# Patient Record
Sex: FEMALE | Race: WHITE | NOT HISPANIC OR LATINO | Employment: FULL TIME | URBAN - METROPOLITAN AREA
[De-identification: names, ages, dates, MRNs, and addresses within clinical notes are randomized per-mention and may not be internally consistent; named-entity substitution may affect disease eponyms.]

---

## 2023-10-04 PROBLEM — Z20.822 EXPOSURE TO COVID-19 VIRUS: Status: RESOLVED | Noted: 2021-07-15 | Resolved: 2023-10-04

## 2024-01-10 ENCOUNTER — HOSPITAL ENCOUNTER (EMERGENCY)
Facility: HOSPITAL | Age: 29
Discharge: HOME | End: 2024-01-10
Attending: EMERGENCY MEDICINE
Payer: MEDICAID

## 2024-01-10 VITALS
TEMPERATURE: 98 F | RESPIRATION RATE: 16 BRPM | HEART RATE: 70 BPM | SYSTOLIC BLOOD PRESSURE: 120 MMHG | HEIGHT: 65 IN | DIASTOLIC BLOOD PRESSURE: 83 MMHG | OXYGEN SATURATION: 98 % | BODY MASS INDEX: 29.99 KG/M2 | WEIGHT: 180 LBS

## 2024-01-10 DIAGNOSIS — S93.601A SPRAIN OF RIGHT FOOT, INITIAL ENCOUNTER: ICD-10-CM

## 2024-01-10 DIAGNOSIS — R52 PAIN: ICD-10-CM

## 2024-01-10 DIAGNOSIS — M79.671 RIGHT FOOT PAIN: Primary | ICD-10-CM

## 2024-01-10 LAB
B-HCG UR QL: NEGATIVE
CTP QC/QA: YES

## 2024-01-10 PROCEDURE — 81025 URINE PREGNANCY TEST: CPT | Performed by: EMERGENCY MEDICINE

## 2024-01-10 PROCEDURE — 29515 APPLICATION SHORT LEG SPLINT: CPT | Mod: RT

## 2024-01-10 PROCEDURE — 25000003 PHARM REV CODE 250: Performed by: EMERGENCY MEDICINE

## 2024-01-10 PROCEDURE — 99283 EMERGENCY DEPT VISIT LOW MDM: CPT

## 2024-01-10 RX ORDER — OXYCODONE AND ACETAMINOPHEN 5; 325 MG/1; MG/1
2 TABLET ORAL
Status: COMPLETED | OUTPATIENT
Start: 2024-01-10 | End: 2024-01-10

## 2024-01-10 RX ORDER — NAPROXEN 500 MG/1
500 TABLET ORAL 2 TIMES DAILY WITH MEALS
Qty: 30 TABLET | Refills: 0 | Status: SHIPPED | OUTPATIENT
Start: 2024-01-10 | End: 2024-04-03

## 2024-01-10 RX ORDER — ESCITALOPRAM OXALATE 20 MG/1
20 TABLET ORAL DAILY
COMMUNITY
End: 2024-01-17

## 2024-01-10 RX ADMIN — OXYCODONE HYDROCHLORIDE AND ACETAMINOPHEN 2 TABLET: 5; 325 TABLET ORAL at 09:01

## 2024-01-11 NOTE — DISCHARGE INSTRUCTIONS
Rest, ice, elevation.  Use splint for comfort.  Return to emergency department for worsening symptoms or any problems.  Nonweightbearing.  Follow-up with orthopedic surgeon for further evaluation

## 2024-01-11 NOTE — ED PROVIDER NOTES
Encounter Date: 1/10/2024       History     Chief Complaint   Patient presents with    Ankle Pain     Right ankle pain starting today at noon after stepping in hole     28-year-old female presented emergency department with right foot and ankle pain.  Patient stepped on dog poop and fell backwards and injured her right foot as she was falling.  Patient denies any other complaints.  Denies fever chills or nausea vomiting.  Patient could not bear weight on the right foot.  Denies any head injury or neck injury or pain      Review of patient's allergies indicates:   Allergen Reactions    Latex, natural rubber Anaphylaxis    Iodine and iodide containing products Rash     Past Medical History:   Diagnosis Date    Anemia      Past Surgical History:   Procedure Laterality Date     SECTION       Family History   Problem Relation Age of Onset    Breast cancer Mother     Cervical cancer Mother     Ovarian cancer Mother      Social History     Tobacco Use    Smoking status: Former     Current packs/day: 0.50     Types: Cigarettes    Smokeless tobacco: Never   Substance Use Topics    Alcohol use: Not Currently    Drug use: Never     Review of Systems   Constitutional: Negative.    HENT: Negative.     Eyes: Negative.    Respiratory: Negative.     Cardiovascular: Negative.  Negative for chest pain.   Gastrointestinal: Negative.    Endocrine: Negative.    Genitourinary: Negative.    Musculoskeletal:  Positive for gait problem and joint swelling.   Skin: Negative.    Allergic/Immunologic: Negative.    Hematological: Negative.    Psychiatric/Behavioral: Negative.     All other systems reviewed and are negative.      Physical Exam     Initial Vitals [01/10/24 2057]   BP Pulse Resp Temp SpO2   117/84 77 16 98.4 °F (36.9 °C) 98 %      MAP       --         Physical Exam    Nursing note and vitals reviewed.  Constitutional: She appears well-developed and well-nourished.   HENT:   Head: Normocephalic and atraumatic.   Nose: Nose  normal.   Mouth/Throat: Oropharynx is clear and moist.   Eyes: Conjunctivae and EOM are normal. Pupils are equal, round, and reactive to light.   Neck: Neck supple. No thyromegaly present. No tracheal deviation present. No JVD present.   Normal range of motion.  Cardiovascular:  Normal rate, regular rhythm, normal heart sounds and intact distal pulses.           No murmur heard.  Pulmonary/Chest: Breath sounds normal. No stridor. No respiratory distress. She has no wheezes. She has no rales.   Abdominal: Abdomen is soft. Bowel sounds are normal.   Musculoskeletal:         General: Tenderness present. No edema. Normal range of motion.      Cervical back: Normal range of motion and neck supple.      Comments: Right foot tenderness in the area of the midfoot diffusely.  Extremities neurovascularly intact.  Mild tenderness of the ankle inferior to the talofibular ligament region.  Ankle joint is stable.  Patient could not bear weight.     Neurological: She is alert and oriented to person, place, and time. She has normal strength. GCS score is 15. GCS eye subscore is 4. GCS verbal subscore is 5. GCS motor subscore is 6.   Skin: Skin is warm. Capillary refill takes less than 2 seconds.   Psychiatric: She has a normal mood and affect. Thought content normal.         ED Course   Procedures  Labs Reviewed   POCT URINE PREGNANCY          Imaging Results              X-Ray Foot Complete Right (Preliminary result)  Result time 01/10/24 23:15:22      Wet Read by Rinku Cartwright MD (01/10/24 23:15:22, [facility] - Emergency Dept, Emergency Medicine)    Normal                                     X-Ray Ankle Complete Right (In process)  Result time 01/10/24 21:27:06                  X-Rays:   Independently Interpreted Readings:   Other Readings:  X-ray does not show any acute fracture however edema noted in the soft tissue region    Medications   oxyCODONE-acetaminophen 5-325 mg per tablet 2 tablet (2 tablets Oral Given  1/10/24 6426)     Medical Decision Making  28-year-old female presented emergency department with ankle pain after ankle injury.  No acute fractures noted on x-ray however has some soft tissue swelling.  Given patient's pain posterior splint placed for comfort.  Patient could not bear weight.  Crutches given.  Discharged with return precautions and instructions and follow-up.  Extremities neurovascularly intact after splint placement.    Amount and/or Complexity of Data Reviewed  Labs: ordered.  Radiology: ordered and independent interpretation performed.    Risk  Prescription drug management.                                      Clinical Impression:  Final diagnoses:  [R52] Pain  [M79.671] Right foot pain (Primary)  [S93.601A] Sprain of right foot, initial encounter          ED Disposition Condition    Discharge Stable          ED Prescriptions       Medication Sig Dispense Start Date End Date Auth. Provider    naproxen (NAPROSYN) 500 MG tablet Take 1 tablet (500 mg total) by mouth 2 (two) times daily with meals. 30 tablet 1/10/2024 -- Rinku Cartwright MD          Follow-up Information       Follow up With Specialties Details Why Contact Info    [facility]  In 2 days  [address] [place] [address]  [phone]      Jose Gomez MD Orthopedic Surgery, Surgery, Sports Medicine In 2 days  [address]  [address]  [place] [address]  [phone]               Rinku Cartwright MD  01/11/24 0009

## 2024-01-12 ENCOUNTER — ON-DEMAND VIRTUAL (OUTPATIENT)
Dept: URGENT CARE | Facility: CLINIC | Age: 29
End: 2024-01-12
Payer: MEDICAID

## 2024-01-12 DIAGNOSIS — T78.40XA ALLERGIC REACTION, INITIAL ENCOUNTER: Primary | ICD-10-CM

## 2024-01-12 PROCEDURE — 99203 OFFICE O/P NEW LOW 30 MIN: CPT | Mod: 95,,, | Performed by: NURSE PRACTITIONER

## 2024-01-12 RX ORDER — IBUPROFEN 800 MG/1
800 TABLET ORAL 3 TIMES DAILY
Qty: 30 TABLET | Refills: 0 | Status: SHIPPED | OUTPATIENT
Start: 2024-01-12

## 2024-01-12 NOTE — PROGRESS NOTES
Subjective:      Patient ID: Vira Lang is a 28 y.o. female.    Vitals:  vitals were not taken for this visit.     Chief Complaint: Allergic Reaction      Visit Type: TELE AUDIOVISUAL    Present with the patient at the time of consultation: TELEMED PRESENT WITH PATIENT: None    Past Medical History:   Diagnosis Date    Anemia      Past Surgical History:   Procedure Laterality Date     SECTION       Review of patient's allergies indicates:   Allergen Reactions    Latex, natural rubber Anaphylaxis    Iodine and iodide containing products Rash     Current Outpatient Medications on File Prior to Visit   Medication Sig Dispense Refill    EScitalopram oxalate (LEXAPRO) 20 MG tablet Take 20 mg by mouth once daily.      loratadine (CLARITIN) 10 mg tablet Take 10 mg by mouth once daily.      naproxen (NAPROSYN) 500 MG tablet Take 1 tablet (500 mg total) by mouth 2 (two) times daily with meals. 30 tablet 0    norelgestromin-ethinyl estradiol 150-35 mcg/24 hr Place 1 patch onto the skin every 7 days. Apply 1 patch per week for 3 weeks, then none for 1 week. 3 patch 17    prenatal vit no.130-iron-folic (PRENATAL VITAMIN) 27 mg iron- 800 mcg Tab Take 1 tablet by mouth once daily. 30 tablet 11     No current facility-administered medications on file prior to visit.     Family History   Problem Relation Age of Onset    Breast cancer Mother     Cervical cancer Mother     Ovarian cancer Mother        Medications Ordered                Unspecified Pharmacy                         Printed (1 of 1)              ibuprofen (ADVIL,MOTRIN) 800 MG tablet    Sig: Take 1 tablet (800 mg total) by mouth 3 (three) times daily. Take with meals.       Start: 24     Quantity: 30 tablet Refills: 0                           Ohs Peq Odvv Intake    2024  4:45 PM CST - Filed by Patient   What is your current physical address in the event of a medical emergency? [address] [facility] [address]   Are you able to take your  vital signs? No   Please attach any relevant images or files          27 yo female with c/o rash on neck due to naproxen given in the ED. She states she injured her foot and they prescribed naproxen but is causing rash and itching. She deneis sob, wheezing, dysphagia.     Allergic Reaction  Associated symptoms include a rash.       Constitution: Negative.   HENT: Negative.     Cardiovascular: Negative.    Respiratory: Negative.     Gastrointestinal: Negative.    Endocrine: negative.   Genitourinary: Negative.  Negative for frequency and urgency.   Musculoskeletal: Negative.    Skin:  Positive for rash.   Allergic/Immunologic: Negative.    Neurological: Negative.    Hematologic/Lymphatic: Negative.    Psychiatric/Behavioral: Negative.          Objective:   The physical exam was conducted virtually.  Physical Exam   Constitutional: She is oriented to person, place, and time. She appears well-developed.   HENT:   Head: Normocephalic and atraumatic.   Ears:   Right Ear: Hearing, tympanic membrane and external ear normal.   Left Ear: Hearing, tympanic membrane and external ear normal.   Nose: Nose normal.   Mouth/Throat: Uvula is midline, oropharynx is clear and moist and mucous membranes are normal.   Eyes: Conjunctivae and EOM are normal. Pupils are equal, round, and reactive to light.   Neck: Neck supple.   Cardiovascular: Normal rate.   Pulmonary/Chest: Effort normal and breath sounds normal.   Musculoskeletal: Normal range of motion.         General: Normal range of motion.   Neurological: She is alert and oriented to person, place, and time.   Skin: Skin is warm and rash.        Psychiatric: Her behavior is normal. Thought content normal.   Nursing note and vitals reviewed.      Assessment:     1. Allergic reaction, initial encounter        Plan:   Please drink plenty of fluids. Please get plenty of rest.    Please return here or go to the Emergency Department for any concerns or worsening of condition.    Please  take over the counter Pepcid or Zantac as directed for the next 24-72hours as needed.    If you were given a steroid shot in the clinic and have also been given a prescription for a steroid such as Prednisone or a Medrol Dose Pack, please begin taking them tomorrow. If you received a steroid shot today - this can elevate your blood pressure, elevate your blood sugar, water weight gain, nervous energy, redness to the face and dimpling of the skin where the shot goes in.     If you have a localized reaction it is ok to apply OTC  topical creams (e.g. Cortaid) as directed to the affected area. You can also use a cool compress to reduce itching.     Please take Claritin or Zyrtec or Allegra (24 hours) twice a day.  You can add Benadryl or Hydroxyzine as needed for itching, however these may make you drowsy, so do not  drive or operate heavy equipment or machinery while taking these medications.      In the future make sure to keep benadryl with you or an Epi-pen if you were prescribed one.               Allergic reaction, initial encounter  -     ibuprofen (ADVIL,MOTRIN) 800 MG tablet; Take 1 tablet (800 mg total) by mouth 3 (three) times daily. Take with meals.  Dispense: 30 tablet; Refill: 0

## 2024-01-17 ENCOUNTER — ON-DEMAND VIRTUAL (OUTPATIENT)
Dept: URGENT CARE | Facility: CLINIC | Age: 29
End: 2024-01-17
Payer: MEDICAID

## 2024-01-17 DIAGNOSIS — B35.9 TINEA: Primary | ICD-10-CM

## 2024-01-17 PROCEDURE — 99203 OFFICE O/P NEW LOW 30 MIN: CPT | Mod: 95,,,

## 2024-01-17 RX ORDER — FLUCONAZOLE 150 MG/1
TABLET ORAL
Qty: 2 TABLET | Refills: 0 | Status: SHIPPED | OUTPATIENT
Start: 2024-01-17 | End: 2024-03-12 | Stop reason: ALTCHOICE

## 2024-01-17 NOTE — PROGRESS NOTES
Subjective:      Patient ID: Vira Lang is a 28 y.o. female.    Vitals:  vitals were not taken for this visit.     Chief Complaint: Rash      Visit Type: TELE AUDIOVISUAL    Present with the patient at the time of consultation: TELEMED PRESENT WITH PATIENT: None    Past Medical History:   Diagnosis Date    Anemia      Past Surgical History:   Procedure Laterality Date     SECTION       Review of patient's allergies indicates:   Allergen Reactions    Latex, natural rubber Anaphylaxis    Iodine and iodide containing products Rash     Current Outpatient Medications on File Prior to Visit   Medication Sig Dispense Refill    ibuprofen (ADVIL,MOTRIN) 800 MG tablet Take 1 tablet (800 mg total) by mouth 3 (three) times daily. Take with meals. 30 tablet 0    loratadine (CLARITIN) 10 mg tablet Take 10 mg by mouth once daily.      naproxen (NAPROSYN) 500 MG tablet Take 1 tablet (500 mg total) by mouth 2 (two) times daily with meals. 30 tablet 0    norelgestromin-ethinyl estradiol 150-35 mcg/24 hr Place 1 patch onto the skin every 7 days. Apply 1 patch per week for 3 weeks, then none for 1 week. 3 patch 17    prenatal vit no.130-iron-folic (PRENATAL VITAMIN) 27 mg iron- 800 mcg Tab Take 1 tablet by mouth once daily. 30 tablet 11    [DISCONTINUED] EScitalopram oxalate (LEXAPRO) 20 MG tablet Take 20 mg by mouth once daily.       No current facility-administered medications on file prior to visit.     Family History   Problem Relation Age of Onset    Breast cancer Mother     Cervical cancer Mother     Ovarian cancer Mother        Medications Ordered                [id number] - [place] - [address] AT [facility] & [place] [address]   [address] [place] [address] [address]    Telephone: [phone]   Fax: [phone]   Hours: Not open 24 hours                         E-Prescribed (2 of 2)              fluconazole (DIFLUCAN) 150 MG Tab    Sig: Take one table now and repeat in 3 days        Start: 1/17/24     Quantity: 2 tablet Refills: 0                         miconazole nitrate 2% (MICOTIN) 2 % Oint    Sig: Apply topically 2 (two) times daily.       Start: 1/17/24     Quantity: 141 g Refills: 1                           Ohs Peq Odvv Intake    1/17/2024  1:20 PM CST - Filed by Patient   What is your current physical address in the event of a medical emergency? [address] [address] [facility] [address]   Are you able to take your vital signs? No   Please attach any relevant images or files          Patient states that for the past several weeks she has had a rash patient states that it started out in one area and then spread patient states that rash is on her abdominal area and neck area. Patient denies any other issues at this time        Constitution: Negative.   HENT: Negative.     Neck: neck negative.   Cardiovascular: Negative.    Eyes: Negative.    Respiratory: Negative.     Gastrointestinal: Negative.    Endocrine: negative.   Musculoskeletal: Negative.    Skin:  Positive for rash.   Allergic/Immunologic: Negative.    Hematologic/Lymphatic: Negative.    Psychiatric/Behavioral: Negative.          Objective:   The physical exam was conducted virtually.  Physical Exam   Constitutional: She is oriented to person, place, and time.   HENT:   Head: Normocephalic and atraumatic.   Nose: Nose normal.   Eyes: Conjunctivae are normal. Pupils are equal, round, and reactive to light. Extraocular movement intact   Pulmonary/Chest: Effort normal.   Abdominal: Normal appearance.   Musculoskeletal: Normal range of motion.         General: Normal range of motion.   Neurological: no focal deficit. She is alert, oriented to person, place, and time and at baseline.   Skin: Skin is rash.   Psychiatric: Her behavior is normal. Mood, judgment and thought content normal.       Assessment:     1. Tinea              Plan:       Tinea  -     miconazole nitrate 2% (MICOTIN) 2 % Oint; Apply topically 2 (two) times daily.   Dispense: 141 g; Refill: 1  -     fluconazole (DIFLUCAN) 150 MG Tab; Take one table now and repeat in 3 days  Dispense: 2 tablet; Refill: 0

## 2024-01-17 NOTE — PATIENT INSTRUCTIONS
You must understand that you've received an Urgent Care treatment only and that you may be released before all your medical problems are known or treated. You, the patient, will arrange for follow up care as instructed.  Follow up with your PCP or specialty clinic as directed in the next 1-2 weeks if not improved or as needed.  You can call [phone] to schedule an appointment with the appropriate provider.  If your condition worsens we recommend that you receive another evaluation at the emergency room immediately or contact your primary medical clinics after hours call service to discuss your concerns.  Please return here or go to the Emergency Department for any concerns or worsening of condition.  Please if you smoke please consider quitting. [facility] Smoke cessation hotline number is [phone], available at this number is free counseling and medications to live a healthier life!         If you were prescribed a narcotic or controlled medication, do not drive or operate heavy equipment or machinery while taking these medications.

## 2024-01-23 ENCOUNTER — HOSPITAL ENCOUNTER (OUTPATIENT)
Dept: RADIOLOGY | Facility: OTHER | Age: 29
Discharge: HOME | End: 2024-01-23
Attending: OBSTETRICS & GYNECOLOGY
Payer: MEDICAID

## 2024-01-23 DIAGNOSIS — N92.6 IRREGULAR UTERINE BLEEDING: ICD-10-CM

## 2024-01-23 PROCEDURE — 76856 US EXAM PELVIC COMPLETE: CPT | Mod: 26,,, | Performed by: RADIOLOGY

## 2024-01-23 PROCEDURE — 76830 TRANSVAGINAL US NON-OB: CPT | Mod: TC

## 2024-01-23 PROCEDURE — 76830 TRANSVAGINAL US NON-OB: CPT | Mod: 26,,, | Performed by: RADIOLOGY

## 2024-02-07 ENCOUNTER — PATIENT MESSAGE (OUTPATIENT)
Dept: URGENT CARE | Facility: CLINIC | Age: 29
End: 2024-02-07
Payer: MEDICAID

## 2024-02-11 ENCOUNTER — ON-DEMAND VIRTUAL (OUTPATIENT)
Dept: URGENT CARE | Facility: CLINIC | Age: 29
End: 2024-02-11
Payer: MEDICAID

## 2024-02-11 DIAGNOSIS — J06.9 URI WITH COUGH AND CONGESTION: Primary | ICD-10-CM

## 2024-02-11 PROCEDURE — 99213 OFFICE O/P EST LOW 20 MIN: CPT | Mod: 95,,, | Performed by: NURSE PRACTITIONER

## 2024-02-11 RX ORDER — ALBUTEROL SULFATE 90 UG/1
2 AEROSOL, METERED RESPIRATORY (INHALATION) EVERY 6 HOURS PRN
Qty: 18 G | Refills: 0 | Status: SHIPPED | OUTPATIENT
Start: 2024-02-11 | End: 2024-04-05

## 2024-02-11 RX ORDER — PREDNISONE 20 MG/1
20 TABLET ORAL DAILY
Qty: 5 TABLET | Refills: 0 | Status: SHIPPED | OUTPATIENT
Start: 2024-02-11 | End: 2024-02-16

## 2024-02-11 RX ORDER — BENZONATATE 100 MG/1
100 CAPSULE ORAL 3 TIMES DAILY PRN
Qty: 30 CAPSULE | Refills: 0 | Status: SHIPPED | OUTPATIENT
Start: 2024-02-11 | End: 2024-02-21

## 2024-02-11 NOTE — PROGRESS NOTES
Subjective:      Patient ID: Vira Lang is a 28 y.o. female.    Vitals:  vitals were not taken for this visit.     Chief Complaint: URI      Visit Type: TELE AUDIOVISUAL    Present with the patient at the time of consultation: TELEMED PRESENT WITH PATIENT: None    Past Medical History:   Diagnosis Date    Anemia      Past Surgical History:   Procedure Laterality Date     SECTION       Review of patient's allergies indicates:   Allergen Reactions    Latex, natural rubber Anaphylaxis    Iodine and iodide containing products Rash     Current Outpatient Medications on File Prior to Visit   Medication Sig Dispense Refill    fluconazole (DIFLUCAN) 150 MG Tab Take one table now and repeat in 3 days 2 tablet 0    ibuprofen (ADVIL,MOTRIN) 800 MG tablet Take 1 tablet (800 mg total) by mouth 3 (three) times daily. Take with meals. 30 tablet 0    loratadine (CLARITIN) 10 mg tablet Take 10 mg by mouth once daily.      miconazole nitrate 2% (MICOTIN) 2 % Oint Apply topically 2 (two) times daily. 141 g 1    naproxen (NAPROSYN) 500 MG tablet Take 1 tablet (500 mg total) by mouth 2 (two) times daily with meals. 30 tablet 0    norelgestromin-ethinyl estradiol 150-35 mcg/24 hr Place 1 patch onto the skin every 7 days. Apply 1 patch per week for 3 weeks, then none for 1 week. 3 patch 17    prenatal vit no.130-iron-folic (PRENATAL VITAMIN) 27 mg iron- 800 mcg Tab Take 1 tablet by mouth once daily. 30 tablet 11     No current facility-administered medications on file prior to visit.     Family History   Problem Relation Age of Onset    Breast cancer Mother     Cervical cancer Mother     Ovarian cancer Mother        Medications Ordered                [facility] [place] - [address] [place] [address].   [address] [place], [place] [address] [address]    Telephone: [phone]   Fax: [phone]   Hours: Not open 24 hours                         E-Prescribed (3 of 3)              albuterol (VENTOLIN HFA) 90 mcg/actuation  inhaler    Sig: Inhale 2 puffs into the lungs every 6 (six) hours as needed for Wheezing. Rescue       Start: 2/11/24     Quantity: 18 g Refills: 0                         benzonatate (TESSALON) 100 MG capsule    Sig: Take 1 capsule (100 mg total) by mouth 3 (three) times daily as needed for Cough.       Start: 2/11/24     Quantity: 30 capsule Refills: 0                         predniSONE (DELTASONE) 20 MG tablet    Sig: Take 1 tablet (20 mg total) by mouth once daily. for 5 days       Start: 2/11/24     Quantity: 5 tablet Refills: 0                           Ohs Peq Odvv Intake    2/11/2024  4:39 PM CST - Filed by Patient   What is your current physical address in the event of a medical emergency? [address] [facility]   Are you able to take your vital signs? No   Please attach any relevant images or files          27 yo female with c/o uri. She has deep cough with sputum production. She states congestion. Denies sob and wheezing. Denies fever. Denies sore throat. Kids were sick. She has hx of asthma    URI   Associated symptoms include congestion, coughing and headaches. Pertinent negatives include no nausea, sore throat, vomiting or wheezing.       Constitution: Negative. Negative for fatigue and fever.   HENT:  Positive for congestion and postnasal drip. Negative for sore throat.    Cardiovascular: Negative.    Respiratory:  Positive for cough. Negative for shortness of breath and wheezing.    Gastrointestinal: Negative.  Negative for nausea and vomiting.   Endocrine: negative.   Genitourinary: Negative.  Negative for frequency and urgency.   Musculoskeletal: Negative.    Skin: Negative.    Allergic/Immunologic: Negative.    Neurological:  Positive for headaches.   Hematologic/Lymphatic: Negative.    Psychiatric/Behavioral: Negative.          Objective:   The physical exam was conducted virtually.  Physical Exam   Constitutional: She is oriented to person, place, and time. She appears well-developed.   HENT:    Head: Normocephalic and atraumatic.   Ears:   Right Ear: Hearing, tympanic membrane and external ear normal.   Left Ear: Hearing, tympanic membrane and external ear normal.   Nose: Rhinorrhea and congestion present.   Mouth/Throat: Uvula is midline, oropharynx is clear and moist and mucous membranes are normal.   Eyes: Conjunctivae and EOM are normal. Pupils are equal, round, and reactive to light.   Neck: Neck supple.   Cardiovascular: Normal rate.   Pulmonary/Chest: Effort normal and breath sounds normal.   Musculoskeletal: Normal range of motion.         General: Normal range of motion.   Neurological: She is alert and oriented to person, place, and time.   Skin: Skin is warm.   Psychiatric: Her behavior is normal. Thought content normal.   Nursing note and vitals reviewed.      Assessment:     1. URI with cough and congestion        Plan:   PLEASE READ YOUR DISCHARGE INSTRUCTIONS ENTIRELY AS IT CONTAINS IMPORTANT INFORMATION.      Please drink plenty of fluids.    Please get plenty of rest.    Please return here or go to the Emergency Department for any concerns or worsening of condition.    Please take an over the counter antihistamine medication (allegra/Claritin/Zyrtec) of your choice as directed.    Try an over the counter decongestant like Mucinex D or Sudafed. You buy this behind the pharmacy counter    If you do have Hypertension or palpitations, it is safe to take Coricidin HBP for relief of sinus symptoms.    If not allergic, please take over the counter Tylenol (Acetaminophen) and/or Motrin (Ibuprofen) as directed for control of pain and/or fever.  Please follow up with your primary care doctor or specialist as needed.    Sore throat recommendations: Warm fluids, warm salt water gargles, throat lozenges, tea, honey, soup, rest, hydration.    Use over the counter flonase: one spray each nostril twice daily OR two sprays each nostril once daily.     Sinus rinses DO NOT USE TAP WATER, if you must, water  must be a rolling boil for 1 minute, let it cool, then use.  May use distilled water, or over the counter nasal saline rinses.  Vics vapor rub in shower to help open nasal passages.  May use nasal gel to keep passages moisturized.  May use Nasal saline sprays during the day for added relief of congestion.   For those who go to the gym, please do not use the sauna or steam room now to clear sinuses.    If you  smoke, please stop smoking.      Please return or see your primary care doctor if you develop new or worsening symptoms.     Please arrange follow up with your primary medical clinic as soon as possible. You must understand that you've received an Urgent Care treatment only and that you may be released before all of your medical problems are known or treated. You, the patient, will arrange for follow up as instructed. If your symptoms worsen or fail to improve you should go to the Emergency Room.      URI with cough and congestion  -     benzonatate (TESSALON) 100 MG capsule; Take 1 capsule (100 mg total) by mouth 3 (three) times daily as needed for Cough.  Dispense: 30 capsule; Refill: 0  -     predniSONE (DELTASONE) 20 MG tablet; Take 1 tablet (20 mg total) by mouth once daily. for 5 days  Dispense: 5 tablet; Refill: 0  -     albuterol (VENTOLIN HFA) 90 mcg/actuation inhaler; Inhale 2 puffs into the lungs every 6 (six) hours as needed for Wheezing. Rescue  Dispense: 18 g; Refill: 0

## 2024-03-03 ENCOUNTER — ON-DEMAND VIRTUAL (OUTPATIENT)
Dept: URGENT CARE | Facility: CLINIC | Age: 29
End: 2024-03-03
Payer: MEDICAID

## 2024-03-03 DIAGNOSIS — L08.9 BACTERIAL SKIN INFECTION: Primary | ICD-10-CM

## 2024-03-03 DIAGNOSIS — B96.89 BACTERIAL SKIN INFECTION: Primary | ICD-10-CM

## 2024-03-03 PROCEDURE — 99213 OFFICE O/P EST LOW 20 MIN: CPT | Mod: 95,,, | Performed by: NURSE PRACTITIONER

## 2024-03-03 RX ORDER — SULFAMETHOXAZOLE AND TRIMETHOPRIM 800; 160 MG/1; MG/1
1 TABLET ORAL 2 TIMES DAILY
Qty: 14 TABLET | Refills: 0 | Status: SHIPPED | OUTPATIENT
Start: 2024-03-03 | End: 2024-03-10

## 2024-03-03 RX ORDER — MUPIROCIN 20 MG/G
OINTMENT TOPICAL 3 TIMES DAILY
Qty: 22 G | Refills: 0 | Status: SHIPPED | OUTPATIENT
Start: 2024-03-03 | End: 2024-04-05

## 2024-03-03 NOTE — PROGRESS NOTES
Subjective:      Patient ID: Vira Lang is a 28 y.o. female.    Vitals:  vitals were not taken for this visit.     Chief Complaint: Vaginal Injury      Visit Type: TELE AUDIOVISUAL    Present with the patient at the time of consultation: TELEMED PRESENT WITH PATIENT: None    LOCATION: home    Past Medical History:   Diagnosis Date    Anemia      Past Surgical History:   Procedure Laterality Date     SECTION       Review of patient's allergies indicates:   Allergen Reactions    Latex, natural rubber Anaphylaxis    Iodine and iodide containing products Rash     Current Outpatient Medications on File Prior to Visit   Medication Sig Dispense Refill    albuterol (VENTOLIN HFA) 90 mcg/actuation inhaler Inhale 2 puffs into the lungs every 6 (six) hours as needed for Wheezing. Rescue 18 g 0    fluconazole (DIFLUCAN) 150 MG Tab Take one table now and repeat in 3 days 2 tablet 0    ibuprofen (ADVIL,MOTRIN) 800 MG tablet Take 1 tablet (800 mg total) by mouth 3 (three) times daily. Take with meals. 30 tablet 0    loratadine (CLARITIN) 10 mg tablet Take 10 mg by mouth once daily.      miconazole nitrate 2% (MICOTIN) 2 % Oint Apply topically 2 (two) times daily. 141 g 1    naproxen (NAPROSYN) 500 MG tablet Take 1 tablet (500 mg total) by mouth 2 (two) times daily with meals. 30 tablet 0    norelgestromin-ethinyl estradiol 150-35 mcg/24 hr Place 1 patch onto the skin every 7 days. Apply 1 patch per week for 3 weeks, then none for 1 week. 3 patch 17    prenatal vit no.130-iron-folic (PRENATAL VITAMIN) 27 mg iron- 800 mcg Tab Take 1 tablet by mouth once daily. 30 tablet 11     No current facility-administered medications on file prior to visit.     Family History   Problem Relation Age of Onset    Breast cancer Mother     Cervical cancer Mother     Ovarian cancer Mother        Medications Ordered                [id number] - [place] - [address]   [address] [place] [address]     Telephone: [phone]   Fax: [phone]   Hours: Not open 24 hours                         E-Prescribed (2 of 2)              mupirocin (BACTROBAN) 2 % ointment    Sig: Apply topically 3 (three) times daily.       Start: 3/3/24     Quantity: 22 g Refills: 0                         sulfamethoxazole-trimethoprim 800-160mg (BACTRIM DS) 800-160 mg Tab    Sig: Take 1 tablet by mouth 2 (two) times daily. for 7 days       Start: 3/3/24     Quantity: 14 tablet Refills: 0                           Ohs Peq Odvv Intake    3/3/2024  9:22 AM CST - Filed by Patient   What is your current physical address in the event of a medical emergency? [id number]   Are you able to take your vital signs? No   Please attach any relevant images or files          27 yo female with c/o vaginal irritation/redness after getting waxed.she states she was concerned for std because now boyfriend has an area to his penis. She states she was tested for std and this is her only partner. She denies discharge, denies lesions.     Vaginal Injury  Pertinent negatives include no frequency or urgency.       Constitution: Negative.   HENT: Negative.     Cardiovascular: Negative.    Respiratory: Negative.     Gastrointestinal: Negative.    Endocrine: negative.   Genitourinary:  Positive for genital sore. Negative for frequency and urgency.   Musculoskeletal: Negative.    Skin: Negative.    Allergic/Immunologic: Negative.  Negative for chronic cough.   Neurological: Negative.    Hematologic/Lymphatic: Negative.    Psychiatric/Behavioral: Negative.          Objective:   The physical exam was conducted virtually.  Physical Exam   Constitutional: She is oriented to person, place, and time. She appears well-developed.   HENT:   Head: Normocephalic and atraumatic.   Ears:   Right Ear: Hearing, tympanic membrane and external ear normal.   Left Ear: Hearing, tympanic membrane and external ear normal.   Nose: Nose normal.   Mouth/Throat: Uvula is midline,  oropharynx is clear and moist and mucous membranes are normal.   Eyes: Conjunctivae and EOM are normal. Pupils are equal, round, and reactive to light.   Neck: Neck supple.   Cardiovascular: Normal rate.   Pulmonary/Chest: Effort normal and breath sounds normal.   Musculoskeletal: Normal range of motion.         General: Normal range of motion.   Neurological: She is alert and oriented to person, place, and time.   Skin: Skin is warm.   Psychiatric: Her behavior is normal. Thought content normal.   Nursing note and vitals reviewed.      Assessment:     1. Bacterial skin infection        Plan:     Recommend std testing if symptoms do not resolve.  Follow up with your primary care provider if symptoms persist.  Go to the Emergency room for worsening of symptoms.     Bacterial skin infection  -     sulfamethoxazole-trimethoprim 800-160mg (BACTRIM DS) 800-160 mg Tab; Take 1 tablet by mouth 2 (two) times daily. for 7 days  Dispense: 14 tablet; Refill: 0  -     mupirocin (BACTROBAN) 2 % ointment; Apply topically 3 (three) times daily.  Dispense: 22 g; Refill: 0

## 2024-03-08 ENCOUNTER — HOSPITAL ENCOUNTER (EMERGENCY)
Facility: HOSPITAL | Age: 29
Discharge: HOME | End: 2024-03-08
Attending: EMERGENCY MEDICINE
Payer: MEDICAID

## 2024-03-08 VITALS
WEIGHT: 175 LBS | OXYGEN SATURATION: 97 % | TEMPERATURE: 99 F | SYSTOLIC BLOOD PRESSURE: 146 MMHG | RESPIRATION RATE: 18 BRPM | HEART RATE: 101 BPM | HEIGHT: 65 IN | DIASTOLIC BLOOD PRESSURE: 92 MMHG | BODY MASS INDEX: 29.16 KG/M2

## 2024-03-08 DIAGNOSIS — Z32.02 NEGATIVE PREGNANCY TEST: ICD-10-CM

## 2024-03-08 DIAGNOSIS — J06.9 VIRAL URI WITH COUGH: Primary | ICD-10-CM

## 2024-03-08 LAB
B-HCG UR QL: NEGATIVE
CTP QC/QA: YES
GROUP A STREP, MOLECULAR: NEGATIVE
INFLUENZA A, MOLECULAR: NEGATIVE
INFLUENZA B, MOLECULAR: NEGATIVE
SARS-COV-2 RDRP RESP QL NAA+PROBE: NEGATIVE
SPECIMEN SOURCE: NORMAL

## 2024-03-08 PROCEDURE — 81025 URINE PREGNANCY TEST: CPT | Performed by: NURSE PRACTITIONER

## 2024-03-08 PROCEDURE — 87502 INFLUENZA DNA AMP PROBE: CPT | Performed by: NURSE PRACTITIONER

## 2024-03-08 PROCEDURE — U0002 COVID-19 LAB TEST NON-CDC: HCPCS | Performed by: NURSE PRACTITIONER

## 2024-03-08 PROCEDURE — 87651 STREP A DNA AMP PROBE: CPT | Performed by: NURSE PRACTITIONER

## 2024-03-08 PROCEDURE — 99282 EMERGENCY DEPT VISIT SF MDM: CPT

## 2024-03-09 ENCOUNTER — ON-DEMAND VIRTUAL (OUTPATIENT)
Dept: URGENT CARE | Facility: CLINIC | Age: 29
End: 2024-03-09
Payer: MEDICAID

## 2024-03-09 DIAGNOSIS — B37.9 YEAST INFECTION: Primary | ICD-10-CM

## 2024-03-09 PROCEDURE — 99213 OFFICE O/P EST LOW 20 MIN: CPT | Mod: 95,,, | Performed by: NURSE PRACTITIONER

## 2024-03-09 RX ORDER — FLUCONAZOLE 150 MG/1
150 TABLET ORAL DAILY
Qty: 2 TABLET | Refills: 0 | Status: SHIPPED | OUTPATIENT
Start: 2024-03-09 | End: 2024-03-12 | Stop reason: ALTCHOICE

## 2024-03-09 NOTE — PROGRESS NOTES
Subjective:      Patient ID: Vira Lang is a 28 y.o. female.    Vitals:  vitals were not taken for this visit.     Chief Complaint: Vaginitis      Visit Type: TELE AUDIOVISUAL    Present with the patient at the time of consultation: TELEMED PRESENT WITH PATIENT: None    LOCATION: home    Past Medical History:   Diagnosis Date    Anemia      Past Surgical History:   Procedure Laterality Date     SECTION       Review of patient's allergies indicates:   Allergen Reactions    Latex, natural rubber Anaphylaxis    Iodine and iodide containing products Rash     Current Outpatient Medications on File Prior to Visit   Medication Sig Dispense Refill    albuterol (VENTOLIN HFA) 90 mcg/actuation inhaler Inhale 2 puffs into the lungs every 6 (six) hours as needed for Wheezing. Rescue 18 g 0    fluconazole (DIFLUCAN) 150 MG Tab Take one table now and repeat in 3 days 2 tablet 0    ibuprofen (ADVIL,MOTRIN) 800 MG tablet Take 1 tablet (800 mg total) by mouth 3 (three) times daily. Take with meals. 30 tablet 0    loratadine (CLARITIN) 10 mg tablet Take 10 mg by mouth once daily.      miconazole nitrate 2% (MICOTIN) 2 % Oint Apply topically 2 (two) times daily. 141 g 1    mupirocin (BACTROBAN) 2 % ointment Apply topically 3 (three) times daily. 22 g 0    naproxen (NAPROSYN) 500 MG tablet Take 1 tablet (500 mg total) by mouth 2 (two) times daily with meals. 30 tablet 0    norelgestromin-ethinyl estradiol 150-35 mcg/24 hr Place 1 patch onto the skin every 7 days. Apply 1 patch per week for 3 weeks, then none for 1 week. 3 patch 17    prenatal vit no.130-iron-folic (PRENATAL VITAMIN) 27 mg iron- 800 mcg Tab Take 1 tablet by mouth once daily. 30 tablet 11    sulfamethoxazole-trimethoprim 800-160mg (BACTRIM DS) 800-160 mg Tab Take 1 tablet by mouth 2 (two) times daily. for 7 days 14 tablet 0     No current facility-administered medications on file prior to visit.     Family History   Problem Relation Age of Onset     Breast cancer Mother     Cervical cancer Mother     Ovarian cancer Mother        Medications Ordered                [id number] - [place] - [address].   [address] [facility] [place] [address]    Telephone: [phone]   Fax: [phone]   Hours: Not open 24 hours                         E-Prescribed (1 of 1)              fluconazole (DIFLUCAN) 150 MG Tab    Sig: Take 1 tablet (150 mg total) by mouth once daily. Take one now and may repeat in 72 h prn for 2 doses       Start: 3/9/24     Quantity: 2 tablet Refills: 0                           Ohs Peq Odvv Intake    3/9/2024 12:33 PM CST - Filed by Patient   What is your current physical address in the event of a medical emergency? [address]   Are you able to take your vital signs? No   Please attach any relevant images or files          29 yo female with c/o vaginal discharge and burning to vaginal area when urinating for several days. She has hx of yeast infections. She denies odor denies std concerns.         Constitution: Negative for fever.   Gastrointestinal:  Negative for nausea and vomiting.   Genitourinary:  Positive for vaginal discharge. Negative for dysuria, hematuria and vaginal odor.        Objective:   The physical exam was conducted virtually.  Physical Exam   Constitutional: She is oriented to person, place, and time. She appears well-developed.   HENT:   Head: Normocephalic and atraumatic.   Ears:   Right Ear: Hearing, tympanic membrane and external ear normal.   Left Ear: Hearing, tympanic membrane and external ear normal.   Nose: Nose normal.   Mouth/Throat: Uvula is midline, oropharynx is clear and moist and mucous membranes are normal.   Eyes: Conjunctivae and EOM are normal. Pupils are equal, round, and reactive to light.   Neck: Neck supple.   Cardiovascular: Normal rate.   Pulmonary/Chest: Effort normal and breath sounds normal.   Musculoskeletal: Normal range of motion.         General: Normal range of motion.    Neurological: She is alert and oriented to person, place, and time.   Skin: Skin is warm.   Psychiatric: Her behavior is normal. Thought content normal.   Nursing note and vitals reviewed.      Assessment:     1. Yeast infection        Plan:   Follow up with your primary care provider if symptoms persist.  Go to the Emergency room for worsening of symptoms.     PLEASE READ YOUR DISCHARGE INSTRUCTIONS ENTIRELY AS IT CONTAINS IMPORTANT INFORMATION.     Take one diflucan when you get it, take the other in 72 hours IF NEEDED        Try taking an over the counter probiotic or eating yogurt.     You can use over the counter clotrimazole (lotrimin) cream to the outer vagina for irritation.      Please return or see your primary care doctor if you develop new or worsening symptoms.      Please arrange follow up with your primary medical clinic as soon as possible. You must understand that you've received an Urgent Care treatment only and that you may be released before all of your medical problems are known or treated. You, the patient, will arrange for follow up as instructed. If your symptoms worsen or fail to improve you should go to the Emergency Room.    Yeast infection  -     fluconazole (DIFLUCAN) 150 MG Tab; Take 1 tablet (150 mg total) by mouth once daily. Take one now and may repeat in 72 h prn for 2 doses  Dispense: 2 tablet; Refill: 0

## 2024-03-09 NOTE — ED PROVIDER NOTES
Encounter Date: 3/8/2024       History     Chief Complaint   Patient presents with    Nausea     X 1 week.  Pt had positive pregnancy test at home    Sore Throat    Cough     Vira Lang is a 28 year old female with pmh anemia, depression presenting to the ED with cough, sore throat, headache, and nausea x 2 days. She has taken dimetapp for her symptoms with some improvement. She also reports several positive pregnancy tests at home. . She is not having any vaginal bleeding/discharge, abdominal pain.       Review of patient's allergies indicates:   Allergen Reactions    Latex, natural rubber Anaphylaxis    Iodine and iodide containing products Rash     Past Medical History:   Diagnosis Date    Anemia      Past Surgical History:   Procedure Laterality Date     SECTION       Family History   Problem Relation Age of Onset    Breast cancer Mother     Cervical cancer Mother     Ovarian cancer Mother      Social History     Tobacco Use    Smoking status: Former     Current packs/day: 0.50     Types: Cigarettes    Smokeless tobacco: Never   Substance Use Topics    Alcohol use: Not Currently    Drug use: Never     Review of Systems   Constitutional:  Negative for chills and fever.   HENT:  Positive for congestion, rhinorrhea and sore throat.    Respiratory:  Positive for cough. Negative for shortness of breath.    Cardiovascular:  Negative for chest pain.   Gastrointestinal:  Positive for nausea. Negative for diarrhea and vomiting.   Genitourinary:  Negative for dysuria, vaginal bleeding and vaginal discharge.   Musculoskeletal:  Negative for back pain.   Skin:  Negative for rash.   Neurological:  Positive for headaches. Negative for weakness.   Hematological:  Does not bruise/bleed easily.       Physical Exam     Initial Vitals [24]   BP Pulse Resp Temp SpO2   (!) 146/92 101 18 99.3 °F (37.4 °C) 97 %      MAP       --         Physical Exam    Nursing note and vitals  reviewed.  Constitutional: She appears well-developed and well-nourished. She is not diaphoretic. No distress.   HENT:   Head: Normocephalic and atraumatic.   Right Ear: Tympanic membrane normal.   Left Ear: Tympanic membrane normal.   Mouth/Throat: Oropharynx is clear and moist.   Eyes: Conjunctivae are normal.   Neck: Neck supple.   Cardiovascular:  Normal rate, regular rhythm, normal heart sounds and intact distal pulses.     Exam reveals no gallop and no friction rub.       No murmur heard.  Pulmonary/Chest: Breath sounds normal. No respiratory distress. She has no wheezes. She has no rhonchi. She has no rales.   Abdominal: Abdomen is soft. She exhibits no distension. There is no abdominal tenderness.   Musculoskeletal:         General: Normal range of motion.      Cervical back: Neck supple.     Neurological: She is alert and oriented to person, place, and time.   Skin: No rash noted. No erythema.   Psychiatric: Her speech is normal.         ED Course   Procedures  Labs Reviewed   GROUP A STREP, MOLECULAR   INFLUENZA A AND B ANTIGEN    Narrative:     Specimen Source->Nasopharyngeal Swab   SARS-COV-2 RNA AMPLIFICATION, QUAL   POCT URINE PREGNANCY          Imaging Results    None          Medications - No data to display  Medical Decision Making  This is an urgent evaluation of a 28 year old female presenting to the ED with upper respiratory symptoms. The patient appears to have a viral upper respiratory infection with a viral syndrome.  Based upon the history and physical exam the patient does not appear to have a serious bacterial infection such as pneumonia, sepsis, otitis media, bacterial sinusitis, strep pharyngitis, parapharyngeal or peritonsillar abscess, meningitis.  I do not think the patient needs antibiotics as their illness likely has a viral etiology.  Patient appears very well and I have given specific return precautions to the patient and/or family members.  I have instructed the patient to  hydrate, take over the counter medications and follow up with their regular doctor or the one provided.  Covid, flu, and strep are negative.     She also has a negative urine pregnancy test. She has had no vaginal bleeding or discharge and also denies abdominal pain/cramping. She has an appointment in 3 days with OBGYN and was instructed to keep this appointment as scheduled. No need for additional labs or imaging at this time. Return for any worsening symptoms. Based on my clinical evaluation, I do not appreciate any immediate, emergent, or life threatening condition or etiology that warrants additional workup today and feel that the patient can be discharged with close follow up care.       Amount and/or Complexity of Data Reviewed  Labs: ordered. Decision-making details documented in ED Course.              Attending Attestation:             Attending ED Notes:   I have reviewed the PA/NP note and plan of care.  I was available for consultation as needed at all times during the patient's visit in the emergency department.  I agree with the clinical impression, plan and disposition.                                 Clinical Impression:  Final diagnoses:  [J06.9] Viral URI with cough (Primary)  [Z32.02] Negative pregnancy test          ED Disposition Condition    Discharge Stable          ED Prescriptions    None       Follow-up Information       Follow up With Specialties Details Why Contact Info Additional Information    [facility] - Emergency Dept Emergency Medicine  As needed, If symptoms worsen [address] [facility] [address]  [phone] 1st floor    Ruth Ruby MD Internal Medicine Schedule an appointment as soon as possible for a visit  As needed [address]   [address]  [facility] [address]  [phone]                Adriana Gilmore NP  03/09/24 [id number]       Sierra Sage MD  04/10/24 7113

## 2024-03-11 ENCOUNTER — LAB VISIT (OUTPATIENT)
Dept: LAB | Facility: HOSPITAL | Age: 29
End: 2024-03-11
Payer: MEDICAID

## 2024-03-11 ENCOUNTER — OFFICE VISIT (OUTPATIENT)
Dept: OBSTETRICS AND GYNECOLOGY | Facility: CLINIC | Age: 29
End: 2024-03-11
Payer: MEDICAID

## 2024-03-11 VITALS
WEIGHT: 180.75 LBS | SYSTOLIC BLOOD PRESSURE: 128 MMHG | HEIGHT: 65 IN | BODY MASS INDEX: 30.12 KG/M2 | HEART RATE: 94 BPM | DIASTOLIC BLOOD PRESSURE: 82 MMHG

## 2024-03-11 DIAGNOSIS — N91.2 AMENORRHEA: Primary | ICD-10-CM

## 2024-03-11 DIAGNOSIS — N91.2 AMENORRHEA: ICD-10-CM

## 2024-03-11 DIAGNOSIS — R31.9 HEMATURIA, UNSPECIFIED TYPE: ICD-10-CM

## 2024-03-11 LAB
B-HCG UR QL: NEGATIVE
BILIRUB SERPL-MCNC: NEGATIVE MG/DL
BLOOD, POC UA: ABNORMAL
CTP QC/QA: YES
GLUCOSE UR QL STRIP: NEGATIVE
HCG INTACT+B SERPL-ACNC: 44 MIU/ML
KETONES UR QL STRIP: NEGATIVE
LEUKOCYTE ESTERASE URINE, POC: ABNORMAL
NITRITE, POC UA: NEGATIVE
PH, POC UA: 6
PROTEIN, POC: 100
SPECIFIC GRAVITY, POC UA: 1.03
UROBILINOGEN, POC UA: 1

## 2024-03-11 PROCEDURE — 99999 PR PBB SHADOW E&M-EST. PATIENT-LVL III: CPT | Mod: PBBFAC,,, | Performed by: STUDENT IN AN ORGANIZED HEALTH CARE EDUCATION/TRAINING PROGRAM

## 2024-03-11 PROCEDURE — 99999PBSHW POCT URINE PREGNANCY: Mod: PBBFAC,,,

## 2024-03-11 PROCEDURE — 1160F RVW MEDS BY RX/DR IN RCRD: CPT | Mod: CPTII,,, | Performed by: STUDENT IN AN ORGANIZED HEALTH CARE EDUCATION/TRAINING PROGRAM

## 2024-03-11 PROCEDURE — 3008F BODY MASS INDEX DOCD: CPT | Mod: CPTII,,, | Performed by: STUDENT IN AN ORGANIZED HEALTH CARE EDUCATION/TRAINING PROGRAM

## 2024-03-11 PROCEDURE — 99213 OFFICE O/P EST LOW 20 MIN: CPT | Mod: S$PBB,,, | Performed by: STUDENT IN AN ORGANIZED HEALTH CARE EDUCATION/TRAINING PROGRAM

## 2024-03-11 PROCEDURE — 99213 OFFICE O/P EST LOW 20 MIN: CPT | Mod: PBBFAC,PO | Performed by: STUDENT IN AN ORGANIZED HEALTH CARE EDUCATION/TRAINING PROGRAM

## 2024-03-11 PROCEDURE — 87086 URINE CULTURE/COLONY COUNT: CPT | Performed by: STUDENT IN AN ORGANIZED HEALTH CARE EDUCATION/TRAINING PROGRAM

## 2024-03-11 PROCEDURE — 3079F DIAST BP 80-89 MM HG: CPT | Mod: CPTII,,, | Performed by: STUDENT IN AN ORGANIZED HEALTH CARE EDUCATION/TRAINING PROGRAM

## 2024-03-11 PROCEDURE — 3074F SYST BP LT 130 MM HG: CPT | Mod: CPTII,,, | Performed by: STUDENT IN AN ORGANIZED HEALTH CARE EDUCATION/TRAINING PROGRAM

## 2024-03-11 PROCEDURE — 81025 URINE PREGNANCY TEST: CPT | Mod: PBBFAC,PO | Performed by: STUDENT IN AN ORGANIZED HEALTH CARE EDUCATION/TRAINING PROGRAM

## 2024-03-11 PROCEDURE — 1159F MED LIST DOCD IN RCRD: CPT | Mod: CPTII,,, | Performed by: STUDENT IN AN ORGANIZED HEALTH CARE EDUCATION/TRAINING PROGRAM

## 2024-03-11 PROCEDURE — 99999PBSHW POCT URINALYSIS: Mod: PBBFAC,,,

## 2024-03-11 PROCEDURE — 81003 URINALYSIS AUTO W/O SCOPE: CPT | Mod: PBBFAC,PO | Performed by: STUDENT IN AN ORGANIZED HEALTH CARE EDUCATION/TRAINING PROGRAM

## 2024-03-11 PROCEDURE — 36415 COLL VENOUS BLD VENIPUNCTURE: CPT | Mod: PO | Performed by: STUDENT IN AN ORGANIZED HEALTH CARE EDUCATION/TRAINING PROGRAM

## 2024-03-11 PROCEDURE — 84702 CHORIONIC GONADOTROPIN TEST: CPT | Performed by: STUDENT IN AN ORGANIZED HEALTH CARE EDUCATION/TRAINING PROGRAM

## 2024-03-11 RX ORDER — ESCITALOPRAM OXALATE 20 MG/1
20 TABLET ORAL 2 TIMES DAILY
COMMUNITY
Start: 2024-03-03

## 2024-03-11 NOTE — PROGRESS NOTES
[facility] Note    Subjective:     Chief Complaint: Possible Pregnancy      HPI:  2024    28-YO female reports multiple positive pregnancy tests at home. LMP 23.    She would be approximately 9w 4d into the pregnancy.     She reports multiple home pregnancy tests which were positive. She has irregular cycles. UPT in clinic was negative. She reports nausea and vomiting.  She is taking a prenatal. Denies any UTI symptoms at this time.     She was seen previously in 2023 for multiple home pregnancy tests which were positive. UPT was negative. Bloodwork was negative for pregnancy. She reports having heavy bleeding about 2 days after the prior appointment. This was most likely a miscarriage.     She did not have any issues conceiving her children.  She is with a different partner now. He has not fathered any children. They are wanting to conceive.     GYN & OB History  Patient's last menstrual period was 2024 (approximate).     Date of Last Pap: 23, negative for intraepithelial lesion or malignancy, repeat in .    OB History    Para Term  AB Living   3 2 1 1 1 2   SAB IAB Ectopic Multiple Live Births   1       2      # Outcome Date GA Lbr Enrique/2nd Weight Sex Delivery Anes PTL Lv   3 SAB 10/2023           2  19 26w0d  0.539 kg (1 lb 3 oz) F CS-Unspec   GILBERT      Complications: Pre-eclampsia   1 Term 17   5.925 kg (13 lb 1 oz) F CS-Unspec   GILBERT       Past Medical History:   Diagnosis Date    Abnormal Pap smear of cervix     Anemia      Past Surgical History:   Procedure Laterality Date     SECTION      x2     Review of patient's allergies indicates:   Allergen Reactions    Latex, natural rubber Anaphylaxis    Iodine and iodide containing products Rash       Social History     Tobacco Use    Smoking status: Former     Current packs/day: 0.50     Types: Cigarettes    Smokeless tobacco: Never   Substance Use Topics     Alcohol use: Not Currently    Drug use: Never       Family History   Problem Relation Age of Onset    Breast cancer Mother     Cervical cancer Mother     Ovarian cancer Mother        Medications  Current Outpatient Medications on File Prior to Visit   Medication Sig Dispense Refill Last Dose    EScitalopram oxalate (LEXAPRO) 20 MG tablet Take 20 mg by mouth 2 (two) times daily.   Taking    albuterol (VENTOLIN HFA) 90 mcg/actuation inhaler Inhale 2 puffs into the lungs every 6 (six) hours as needed for Wheezing. Rescue (Patient not taking: Reported on 3/11/2024) 18 g 0 Not Taking    fluconazole (DIFLUCAN) 150 MG Tab Take one table now and repeat in 3 days (Patient not taking: Reported on 3/11/2024) 2 tablet 0 Not Taking    fluconazole (DIFLUCAN) 150 MG Tab Take 1 tablet (150 mg total) by mouth once daily. Take one now and may repeat in 72 h prn for 2 doses (Patient not taking: Reported on 3/11/2024) 2 tablet 0 Not Taking    ibuprofen (ADVIL,MOTRIN) 800 MG tablet Take 1 tablet (800 mg total) by mouth 3 (three) times daily. Take with meals. (Patient not taking: Reported on 3/11/2024) 30 tablet 0 Not Taking    loratadine (CLARITIN) 10 mg tablet Take 10 mg by mouth once daily.   Not Taking    miconazole nitrate 2% (MICOTIN) 2 % Oint Apply topically 2 (two) times daily. (Patient not taking: Reported on 3/11/2024) 141 g 1 Not Taking    mupirocin (BACTROBAN) 2 % ointment Apply topically 3 (three) times daily. (Patient not taking: Reported on 3/11/2024) 22 g 0 Not Taking    naproxen (NAPROSYN) 500 MG tablet Take 1 tablet (500 mg total) by mouth 2 (two) times daily with meals. (Patient not taking: Reported on 3/11/2024) 30 tablet 0 Not Taking    norelgestromin-ethinyl estradiol 150-35 mcg/24 hr Place 1 patch onto the skin every 7 days. Apply 1 patch per week for 3 weeks, then none for 1 week. (Patient not taking: Reported on 3/11/2024) 3 patch 17 Not Taking    prenatal vit no.130-iron-folic (PRENATAL VITAMIN) 27 mg iron- 800  "mcg Tab Take 1 tablet by mouth once daily. (Patient not taking: Reported on 3/11/2024) 30 tablet 11 Not Taking    [] sulfamethoxazole-trimethoprim 800-160mg (BACTRIM DS) 800-160 mg Tab Take 1 tablet by mouth 2 (two) times daily. for 7 days 14 tablet 0        Review of Systems   Constitutional: Negative for appetite change, fever and unexpected weight change.   Respiratory: Negative for cough and shortness of breath.    Cardiovascular: Negative for chest pain and palpitations.   Genitourinary: Negative for dyspareunia, dysuria, hematuria and pelvic pain.        GYN ROS per HPI.   Psychiatric/Behavioral: The patient is not nervous/anxious.      Objective:     /82 (BP Location: Right arm, Patient Position: Sitting, BP Method: Medium (Automatic))   Pulse 94   Ht 5' 5" (1.651 m)   Wt 82 kg (180 lb 12.4 oz)   LMP 2024 (Approximate)   BMI 30.08 kg/m²     Physical Exam  Constitutional:       General: She is not in acute distress.  HENT:      Head: Normocephalic.   Pulmonary:      Effort: Pulmonary effort is normal. No respiratory distress.   Neurological:      General: No focal deficit present.      Mental Status: She is alert.   Psychiatric:         Mood and Affect: Mood normal.         Behavior: Behavior normal.         Assessment:     1. Amenorrhea    2. Hematuria, unspecified type      Plan:     1. Amenorrhea  - HCG, Quantitative; Future  - POCT Urine Pregnancy    2. Hematuria, unspecified type  - Urine culture  - POCT URINALYSIS    UPT in clinic was negative.   Will confirm pregnancy status with HCG. We may need to repeat this level to determine a trend. We will base further management on these lab results.  If pregnant, will schedule patient a new OB appointment at a later date.      Since they are wanting to conceive, may consider further management with a physician if they have issues conceiving.     Recommended pepcid, vitamin B6 and doxylamine for nausea and vomiting.     UA results showed " trace blood, protein and leukocytes. Will send for culture to rule out infection.       Follow up for evaluation based on results.  Follow-up sooner if needed.   Patient will be notified when results return and further recommendations will be given at that time.       Glenna Hawley PA-C  03/11/2024

## 2024-03-12 DIAGNOSIS — N91.2 AMENORRHEA: Primary | ICD-10-CM

## 2024-03-12 LAB
BACTERIA UR CULT: NORMAL
BACTERIA UR CULT: NORMAL

## 2024-03-13 ENCOUNTER — LAB VISIT (OUTPATIENT)
Dept: LAB | Facility: HOSPITAL | Age: 29
End: 2024-03-13
Attending: STUDENT IN AN ORGANIZED HEALTH CARE EDUCATION/TRAINING PROGRAM
Payer: MEDICAID

## 2024-03-13 DIAGNOSIS — N91.2 AMENORRHEA: ICD-10-CM

## 2024-03-13 LAB — HCG INTACT+B SERPL-ACNC: 113 MIU/ML

## 2024-03-13 PROCEDURE — 36415 COLL VENOUS BLD VENIPUNCTURE: CPT | Mod: PO | Performed by: STUDENT IN AN ORGANIZED HEALTH CARE EDUCATION/TRAINING PROGRAM

## 2024-03-13 PROCEDURE — 84702 CHORIONIC GONADOTROPIN TEST: CPT | Performed by: STUDENT IN AN ORGANIZED HEALTH CARE EDUCATION/TRAINING PROGRAM

## 2024-03-15 DIAGNOSIS — Z32.01 PREGNANCY TEST POSITIVE: Primary | ICD-10-CM

## 2024-03-18 ENCOUNTER — TELEPHONE (OUTPATIENT)
Dept: OBSTETRICS AND GYNECOLOGY | Facility: CLINIC | Age: 29
End: 2024-03-18
Payer: MEDICAID

## 2024-03-18 ENCOUNTER — LAB VISIT (OUTPATIENT)
Dept: LAB | Facility: HOSPITAL | Age: 29
End: 2024-03-18
Attending: OBSTETRICS & GYNECOLOGY
Payer: MEDICAID

## 2024-03-18 DIAGNOSIS — Z32.01 PREGNANCY TEST POSITIVE: ICD-10-CM

## 2024-03-18 LAB — HCG INTACT+B SERPL-ACNC: 926 MIU/ML

## 2024-03-18 PROCEDURE — 36415 COLL VENOUS BLD VENIPUNCTURE: CPT | Mod: PO | Performed by: OBSTETRICS & GYNECOLOGY

## 2024-03-18 PROCEDURE — 84702 CHORIONIC GONADOTROPIN TEST: CPT | Performed by: OBSTETRICS & GYNECOLOGY

## 2024-03-22 ENCOUNTER — HOSPITAL ENCOUNTER (EMERGENCY)
Facility: HOSPITAL | Age: 29
Discharge: HOME | End: 2024-03-23
Attending: EMERGENCY MEDICINE
Payer: MEDICAID

## 2024-03-22 DIAGNOSIS — O20.0 THREATENED MISCARRIAGE: Primary | ICD-10-CM

## 2024-03-22 DIAGNOSIS — O46.90 VAGINAL BLEEDING IN PREGNANCY: ICD-10-CM

## 2024-03-22 LAB
ABO + RH BLD: NORMAL
ALBUMIN SERPL BCP-MCNC: 3.8 G/DL (ref 3.5–5.2)
ALP SERPL-CCNC: 81 U/L (ref 55–135)
ALT SERPL W/O P-5'-P-CCNC: 43 U/L (ref 10–44)
ANION GAP SERPL CALC-SCNC: 11 MMOL/L (ref 8–16)
AST SERPL-CCNC: 21 U/L (ref 10–40)
B-HCG UR QL: POSITIVE
BACTERIA #/AREA URNS HPF: ABNORMAL /HPF
BASOPHILS # BLD AUTO: 0.07 K/UL (ref 0–0.2)
BASOPHILS NFR BLD: 0.7 % (ref 0–1.9)
BILIRUB SERPL-MCNC: 0.3 MG/DL (ref 0.1–1)
BILIRUB UR QL STRIP: NEGATIVE
BLD GP AB SCN CELLS X3 SERPL QL: NORMAL
BUN SERPL-MCNC: 7 MG/DL (ref 6–20)
CALCIUM SERPL-MCNC: 9.5 MG/DL (ref 8.7–10.5)
CHLORIDE SERPL-SCNC: 109 MMOL/L (ref 95–110)
CLARITY UR: CLEAR
CO2 SERPL-SCNC: 23 MMOL/L (ref 23–29)
COLOR UR: YELLOW
CREAT SERPL-MCNC: 0.8 MG/DL (ref 0.5–1.4)
CTP QC/QA: YES
DIFFERENTIAL METHOD BLD: NORMAL
EOSINOPHIL # BLD AUTO: 0.3 K/UL (ref 0–0.5)
EOSINOPHIL NFR BLD: 2.7 % (ref 0–8)
ERYTHROCYTE [DISTWIDTH] IN BLOOD BY AUTOMATED COUNT: 14.1 % (ref 11.5–14.5)
EST. GFR  (NO RACE VARIABLE): >60 ML/MIN/1.73 M^2
GLUCOSE SERPL-MCNC: 100 MG/DL (ref 70–110)
GLUCOSE UR QL STRIP: NEGATIVE
HCG INTACT+B SERPL-ACNC: 5711 MIU/ML
HCT VFR BLD AUTO: 40.3 % (ref 37–48.5)
HGB BLD-MCNC: 13 G/DL (ref 12–16)
HGB UR QL STRIP: NEGATIVE
IMM GRANULOCYTES # BLD AUTO: 0.03 K/UL (ref 0–0.04)
IMM GRANULOCYTES NFR BLD AUTO: 0.3 % (ref 0–0.5)
KETONES UR QL STRIP: NEGATIVE
LEUKOCYTE ESTERASE UR QL STRIP: ABNORMAL
LYMPHOCYTES # BLD AUTO: 2.6 K/UL (ref 1–4.8)
LYMPHOCYTES NFR BLD: 26.1 % (ref 18–48)
MCH RBC QN AUTO: 29.3 PG (ref 27–31)
MCHC RBC AUTO-ENTMCNC: 32.3 G/DL (ref 32–36)
MCV RBC AUTO: 91 FL (ref 82–98)
MICROSCOPIC COMMENT: ABNORMAL
MONOCYTES # BLD AUTO: 0.7 K/UL (ref 0.3–1)
MONOCYTES NFR BLD: 6.7 % (ref 4–15)
NEUTROPHILS # BLD AUTO: 6.4 K/UL (ref 1.8–7.7)
NEUTROPHILS NFR BLD: 63.5 % (ref 38–73)
NITRITE UR QL STRIP: NEGATIVE
NRBC BLD-RTO: 0 /100 WBC
PH UR STRIP: 7 [PH] (ref 5–8)
PLATELET # BLD AUTO: 229 K/UL (ref 150–450)
PMV BLD AUTO: 9.4 FL (ref 9.2–12.9)
POTASSIUM SERPL-SCNC: 3.9 MMOL/L (ref 3.5–5.1)
PROT SERPL-MCNC: 6.9 G/DL (ref 6–8.4)
PROT UR QL STRIP: NEGATIVE
RBC # BLD AUTO: 4.44 M/UL (ref 4–5.4)
RBC #/AREA URNS HPF: 0 /HPF (ref 0–4)
SODIUM SERPL-SCNC: 143 MMOL/L (ref 136–145)
SP GR UR STRIP: 1.01 (ref 1–1.03)
SPECIMEN OUTDATE: NORMAL
SQUAMOUS #/AREA URNS HPF: 4 /HPF
URN SPEC COLLECT METH UR: ABNORMAL
UROBILINOGEN UR STRIP-ACNC: NEGATIVE EU/DL
WBC # BLD AUTO: 10.11 K/UL (ref 3.9–12.7)
WBC #/AREA URNS HPF: 6 /HPF (ref 0–5)

## 2024-03-22 PROCEDURE — 36415 COLL VENOUS BLD VENIPUNCTURE: CPT | Performed by: NURSE PRACTITIONER

## 2024-03-22 PROCEDURE — 80053 COMPREHEN METABOLIC PANEL: CPT | Performed by: NURSE PRACTITIONER

## 2024-03-22 PROCEDURE — 85025 COMPLETE CBC W/AUTO DIFF WBC: CPT | Performed by: NURSE PRACTITIONER

## 2024-03-22 PROCEDURE — 86850 RBC ANTIBODY SCREEN: CPT | Performed by: NURSE PRACTITIONER

## 2024-03-22 PROCEDURE — 84702 CHORIONIC GONADOTROPIN TEST: CPT | Performed by: NURSE PRACTITIONER

## 2024-03-22 PROCEDURE — 99284 EMERGENCY DEPT VISIT MOD MDM: CPT | Mod: 25

## 2024-03-22 PROCEDURE — 81025 URINE PREGNANCY TEST: CPT | Performed by: NURSE PRACTITIONER

## 2024-03-22 PROCEDURE — 81000 URINALYSIS NONAUTO W/SCOPE: CPT | Performed by: NURSE PRACTITIONER

## 2024-03-23 VITALS
BODY MASS INDEX: 29.99 KG/M2 | OXYGEN SATURATION: 100 % | SYSTOLIC BLOOD PRESSURE: 125 MMHG | TEMPERATURE: 98 F | RESPIRATION RATE: 18 BRPM | HEART RATE: 78 BPM | WEIGHT: 180 LBS | HEIGHT: 65 IN | DIASTOLIC BLOOD PRESSURE: 69 MMHG

## 2024-03-23 PROCEDURE — 25000003 PHARM REV CODE 250: Performed by: EMERGENCY MEDICINE

## 2024-03-23 RX ORDER — ONDANSETRON 4 MG/1
4 TABLET, ORALLY DISINTEGRATING ORAL
Status: COMPLETED | OUTPATIENT
Start: 2024-03-23 | End: 2024-03-23

## 2024-03-23 RX ADMIN — ONDANSETRON HYDROCHLORIDE 4 MG: 4 TABLET, ORALLY DISINTEGRATING ORAL at 12:03

## 2024-03-23 NOTE — FIRST PROVIDER EVALUATION
Emergency Department TeleTriage Encounter Note      CHIEF COMPLAINT    Chief Complaint   Patient presents with    Vaginal Bleeding     Light spotting started today 6 weeks pregnant        VITAL SIGNS   Initial Vitals [03/22/24 1841]   BP Pulse Resp Temp SpO2   139/80 94 18 97.8 °F (36.6 °C) 100 %      MAP       --            ALLERGIES    Review of patient's allergies indicates:   Allergen Reactions    Latex, natural rubber Anaphylaxis    Iodine and iodide containing products Rash       PROVIDER TRIAGE NOTE  28-year-old female presents to the ER with complaints new onset abdominal cramping lower pelvic pain and vaginal bleeding that began this afternoon.  She states she is currently 6 weeks pregnant.  Has not been OB yet.  No dysuria or lightheadedness.  No nausea vomiting diarrhea.    AAOx3, respirations even and non- labored, stable vitals, normal coloration of skin, sitting upright in triage chair, appears in no acute distress.          ORDERS  Labs Reviewed - No data to display    ED Orders (720h ago, onward)      None              Virtual Visit Note: The provider triage portion of this emergency department evaluation and documentation was performed via [employer], a HIPAA-compliant telemedicine application, in concert with a tele-presenter in the room. A face to face patient evaluation with one of my colleagues will occur once the patient is placed in an emergency department room.      DISCLAIMER: This note was prepared with [employer] voice recognition transcription software. Garbled syntax, mangled pronouns, and other bizarre constructions may be attributed to that software system.

## 2024-03-23 NOTE — ED PROVIDER NOTES
Encounter Date: 3/22/2024       History     Chief Complaint   Patient presents with    Vaginal Bleeding     Light spotting started today 6 weeks pregnant      HPI 28-year-old woman with multiple previous miscarriages and 2 living children presents emergency department for vaginal spotting that began today.  She has had some mild left lower pelvic tenderness.  She is proximally 6 week pregnant.  She sees Dr. Stringer and Glenna Hawley  Review of patient's allergies indicates:   Allergen Reactions    Latex, natural rubber Anaphylaxis    Iodine and iodide containing products Rash     Past Medical History:   Diagnosis Date    Abnormal Pap smear of cervix     Anemia      Past Surgical History:   Procedure Laterality Date     SECTION      x2     Family History   Problem Relation Age of Onset    Breast cancer Mother     Cervical cancer Mother     Ovarian cancer Mother      Social History     Tobacco Use    Smoking status: Former     Current packs/day: 0.50     Types: Cigarettes    Smokeless tobacco: Never   Substance Use Topics    Alcohol use: Not Currently    Drug use: Never     Review of Systems   Constitutional:  Negative for fever.   HENT:  Negative for sore throat.    Respiratory:  Negative for shortness of breath.    Cardiovascular:  Negative for chest pain.   Gastrointestinal:  Negative for nausea.   Genitourinary:  Positive for pelvic pain and vaginal bleeding. Negative for dysuria.   Musculoskeletal:  Negative for back pain.   Skin:  Negative for rash.   Neurological:  Negative for weakness.   Hematological:  Does not bruise/bleed easily.       Physical Exam     Initial Vitals [24 1841]   BP Pulse Resp Temp SpO2   139/80 94 18 97.8 °F (36.6 °C) 100 %      MAP       --         Physical Exam    Constitutional: Vital signs are normal. She appears well-developed and well-nourished.  Non-toxic appearance. No distress.   HENT:   Head: Normocephalic and atraumatic.   Eyes: EOM are normal. Pupils are equal,  round, and reactive to light.   Neck: Neck supple. No JVD present.   Normal range of motion.  Cardiovascular:  Normal rate, regular rhythm, normal heart sounds and intact distal pulses.     Exam reveals no gallop and no friction rub.       No murmur heard.  Pulmonary/Chest: Breath sounds normal. She has no wheezes. She has no rhonchi. She has no rales.   Abdominal: Abdomen is soft. Bowel sounds are normal. There is no abdominal tenderness. There is no rebound and no guarding.   Musculoskeletal:         General: Normal range of motion.      Cervical back: Normal range of motion and neck supple. No rigidity.     Neurological: She is alert and oriented to person, place, and time. She has normal strength and normal reflexes. No cranial nerve deficit or sensory deficit. She exhibits normal muscle tone. Coordination normal. GCS eye subscore is 4. GCS verbal subscore is 5. GCS motor subscore is 6.   Skin: Skin is warm and dry.   Psychiatric: She has a normal mood and affect. Her speech is normal and behavior is normal. She is not actively hallucinating.         ED Course   Procedures  Labs Reviewed   URINALYSIS, REFLEX TO URINE CULTURE - Abnormal; Notable for the following components:       Result Value    Leukocytes, UA 3+ (*)     All other components within normal limits    Narrative:     Specimen Source->Urine   URINALYSIS MICROSCOPIC - Abnormal; Notable for the following components:    WBC, UA 6 (*)     Bacteria Moderate (*)     All other components within normal limits    Narrative:     Specimen Source->Urine   POCT URINE PREGNANCY - Abnormal; Notable for the following components:    POC Preg Test, Ur Positive (*)     All other components within normal limits   CBC W/ AUTO DIFFERENTIAL    Narrative:     Release to patient->Immediate   COMPREHENSIVE METABOLIC PANEL    Narrative:     Release to patient->Immediate   HCG, QUANTITATIVE    Narrative:     Release to patient->Immediate   TYPE & SCREEN          Imaging Results               US OB <14 Wks TransAbd & TransVag, Single Gestation (XPD) (In process)                      Medications   ondansetron disintegrating tablet 4 mg (4 mg Oral Given 3/23/24 0030)     Medical Decision Making  8-year-old woman with multiple previous miscarriages and 2 living children presents emergency department for vaginal spotting that began today.  She has had some mild left lower pelvic tenderness.  She is aproximally 6 week pregnant.  She sees Dr. Stringer and Glenna Hawley.  No dysuria.  Urinalysis with moderate bacteria and 6 white blood cells, nitrite negative.  Do not think this represents a UTI given lack of symptoms.  Beta hCG is 5711.  Patient well-appearing in no acute distress.  No significant abdominal tenderness on exam.  Ultrasound fails to confirm definite IUP however may be having a gestational sac forming in the uterus, unable to exclude ectopic pregnancy.  Discussed with Dr. Stringer who will follow-up patient closely.    Risk  Prescription drug management.                                      Clinical Impression:  Final diagnoses:  [O20.0] Threatened miscarriage (Primary)  [O46.90] Vaginal bleeding in pregnancy          ED Disposition Condition    Discharge Stable          ED Prescriptions    None       Follow-up Information       Follow up With Specialties Details Why Contact Info Additional Information    Cameron Stringer MD Obstetrics and Gynecology On 3/25/2024  [address]  [address]  [facility] [address]  [phone]       [facility] -  Emergency Medicine  As needed, If symptoms worsen [address] [place] [address] [address] [place] [id number] 1st floor             Luke Youssef MD  03/23/24 4075

## 2024-03-25 ENCOUNTER — LAB VISIT (OUTPATIENT)
Dept: LAB | Facility: HOSPITAL | Age: 29
End: 2024-03-25
Attending: OBSTETRICS & GYNECOLOGY
Payer: MEDICAID

## 2024-03-25 ENCOUNTER — TELEPHONE (OUTPATIENT)
Dept: OBSTETRICS AND GYNECOLOGY | Facility: CLINIC | Age: 29
End: 2024-03-25
Payer: MEDICAID

## 2024-03-25 DIAGNOSIS — Z32.01 PREGNANCY TEST POSITIVE: ICD-10-CM

## 2024-03-25 LAB — HCG INTACT+B SERPL-ACNC: NORMAL MIU/ML

## 2024-03-25 PROCEDURE — 36415 COLL VENOUS BLD VENIPUNCTURE: CPT | Mod: PO | Performed by: OBSTETRICS & GYNECOLOGY

## 2024-03-25 PROCEDURE — 84702 CHORIONIC GONADOTROPIN TEST: CPT | Performed by: OBSTETRICS & GYNECOLOGY

## 2024-03-25 NOTE — TELEPHONE ENCOUNTER
Patient is slightly bleeding and I left her a message to go to the emergency room if she bleeding heavily.

## 2024-03-26 ENCOUNTER — TELEPHONE (OUTPATIENT)
Dept: OBSTETRICS AND GYNECOLOGY | Facility: CLINIC | Age: 29
End: 2024-03-26
Payer: MEDICAID

## 2024-03-26 DIAGNOSIS — O20.0 THREATENED ABORTION IN FIRST TRIMESTER: Primary | ICD-10-CM

## 2024-03-26 NOTE — TELEPHONE ENCOUNTER
----- Message from Cameron Stringer MD sent at 3/26/2024 10:28 AM CDT -----  Please contact this patient and let her know her pregnancy test has been rising appropriately.  Please schedule her for ultrasound with Yessenia this Thursday, 03/28/2024.  I am scheduled to see her April 1st but if there has an issue this Thursday I will evaluate her.

## 2024-04-01 ENCOUNTER — HOSPITAL ENCOUNTER (OUTPATIENT)
Dept: OBSTETRICS AND GYNECOLOGY | Facility: CLINIC | Age: 29
Discharge: HOME | End: 2024-04-01
Attending: OBSTETRICS & GYNECOLOGY
Payer: MEDICAID

## 2024-04-01 ENCOUNTER — INITIAL PRENATAL (OUTPATIENT)
Dept: OBSTETRICS AND GYNECOLOGY | Facility: CLINIC | Age: 29
End: 2024-04-01
Payer: MEDICAID

## 2024-04-01 VITALS
SYSTOLIC BLOOD PRESSURE: 120 MMHG | BODY MASS INDEX: 30.67 KG/M2 | DIASTOLIC BLOOD PRESSURE: 84 MMHG | HEART RATE: 85 BPM | WEIGHT: 184.31 LBS

## 2024-04-01 DIAGNOSIS — O21.9 NAUSEA AND VOMITING IN PREGNANCY: ICD-10-CM

## 2024-04-01 DIAGNOSIS — O20.0 THREATENED ABORTION IN FIRST TRIMESTER: ICD-10-CM

## 2024-04-01 DIAGNOSIS — Z3A.01 6 WEEKS GESTATION OF PREGNANCY: Primary | ICD-10-CM

## 2024-04-01 DIAGNOSIS — Z32.01 POSITIVE PREGNANCY TEST: ICD-10-CM

## 2024-04-01 PROCEDURE — 99213 OFFICE O/P EST LOW 20 MIN: CPT | Mod: PBBFAC,TH,PO | Performed by: OBSTETRICS & GYNECOLOGY

## 2024-04-01 PROCEDURE — 99999 PR PBB SHADOW E&M-EST. PATIENT-LVL III: CPT | Mod: PBBFAC,,, | Performed by: OBSTETRICS & GYNECOLOGY

## 2024-04-01 PROCEDURE — 87086 URINE CULTURE/COLONY COUNT: CPT | Performed by: OBSTETRICS & GYNECOLOGY

## 2024-04-01 PROCEDURE — 99214 OFFICE O/P EST MOD 30 MIN: CPT | Mod: TH,S$PBB,, | Performed by: OBSTETRICS & GYNECOLOGY

## 2024-04-01 PROCEDURE — 87491 CHLMYD TRACH DNA AMP PROBE: CPT | Performed by: OBSTETRICS & GYNECOLOGY

## 2024-04-01 PROCEDURE — 76801 OB US < 14 WKS SINGLE FETUS: CPT | Mod: 26,,, | Performed by: OBSTETRICS & GYNECOLOGY

## 2024-04-01 NOTE — PATIENT INSTRUCTIONS
Have a question or concern?    PRO TIP: Program these phone numbers in your cell phone!    for an emergency  call 911 or go to the nearest hospital Especially after 20 weeks of the pregnancy, please remember that  Labor & Delivery is at [facility].  There is no L&D at [facility] (formerly called [facility]).   [facility] Nurse Care Advice Line  [phone] At any time during your pregnancy,  you can speak to a nurse 24-7.   for non-urgent issues, send us a  message in [employer] Consider calling the Nurse Care Advice Line if it's a weekend or  toward the end of the work-day since  [employer] and phone messages may NOT be answered for a day or two.   for non-urgent issues, call the clinic  [phone], Option 3    Labor & Delivery  [phone] Starting at 20 weeks of the pregnancy,  you can speak to a nurse on L&D 24-7.       PLEASE BE AWARE THAT THE BACK ENTRANCE BY LABOR AND DELIVERY (THE BLUE AWNING) IS ONLY OPEN UNTIL 6:00 P.M..  AFTER 6:00 P.M. PLEASE ENTER THROUGH THE EMERGENCY ROOM AT [facility].      FIRST TRIMESTER  0 - 13+6 weeks    Adapting to Pregnancy: First Trimester   As your body adjusts, you may have to change or limit your daily activities. You'll need more rest. You may also need to use the energy you have more wisely.     Your Changing Body   Almost every part of your body is affected as you adapt to pregnancy. You may not look very pregnant during the first three months, but you are likely to have some common signs of early pregnancy: Nausea, Fatigue, Frequent urination, Mood swings, Bloating of the abdomen, Nipple or breast tenderness, Breast swelling.    It's Not Too Late to Start Good Habits   What matters most is protecting your baby from this moment on. If you smoke, drink alcohol, or use drugs, now is the time to stop. If you need help, talk with your health care provider.   Smoking increases the risk of miscarriage or having a low-birth-weight baby. If you smoke,  quit now.   Alcohol and drugs like marijuana have been linked to miscarriage, birth defects, intellectual disability, and low birth weight. Do not drink alcohol or use drugs.    Tips to Relieve Nausea   There's lots more information in your OB Welcome Packet, but here are a few ideas:  Eat small, light meals at frequent intervals.   Get up slowly. Eat a few unsalted crackers before you get out of bed.   Drink water with lemon slices.   Eat an ice pop in your favorite flavor.   Ask your health care provider about taking yael or vitamin B6 for nausea and vomiting.   Talk with your health care provider if you take vitamins that upset your stomach.    Advice for Travel   Talk to your health care provider first, but the second trimester may be the best time for travel. You may be advised to avoid certain trips while you're pregnant. Food and water can be concerns in developing countries. Travel by car is a good choice since you can stop, get out, and stretch (should be done at least every 2 hours). Bring snacks and water along. Fasten the lap belt below your belly, low over your hips. Also be sure to wear the shoulder harness.   We usually recommend no flying beyond 35 completed weeks (35+6).    Intimacy   Unless your health care provider tells you to, there is no reason to stop having sex while you're pregnant. You or your partner may notice changes in desire. Desire may be less in the first trimester, due to nausea and fatigue. In the second trimester, sex may be very enjoyable. The third trimester can be a challenge comfort-wise. Try different positions and see what's best for you.    Baby!  Major organs and nervous system are developing, the heart starts beating, lungs begin development, bones appear, all the little parts start to form (head, face, eyes, ears, arms, fingers, legs, and toes), hair starts to grow, and 20 tooth-buds develop.

## 2024-04-02 LAB
C TRACH DNA SPEC QL NAA+PROBE: NOT DETECTED
N GONORRHOEA DNA SPEC QL NAA+PROBE: NOT DETECTED

## 2024-04-03 ENCOUNTER — PATIENT MESSAGE (OUTPATIENT)
Dept: OBSTETRICS AND GYNECOLOGY | Facility: CLINIC | Age: 29
End: 2024-04-03
Payer: MEDICAID

## 2024-04-03 LAB — BACTERIA UR CULT: NORMAL

## 2024-04-03 RX ORDER — ASPIRIN 81 MG/1
81 TABLET ORAL DAILY
Qty: 90 TABLET | Refills: 3 | Status: SHIPPED | OUTPATIENT
Start: 2024-04-03 | End: 2025-04-03

## 2024-04-03 RX ORDER — DOXYLAMINE SUCCINATE AND PYRIDOXINE HYDROCHLORIDE, DELAYED RELEASE TABLETS 10 MG/10 MG 10; 10 MG/1; MG/1
2 TABLET, DELAYED RELEASE ORAL NIGHTLY
Qty: 60 TABLET | Refills: 11 | Status: SHIPPED | OUTPATIENT
Start: 2024-04-03 | End: 2025-04-03

## 2024-04-05 RX ORDER — B-COMPLEX WITH VITAMIN C
1 TABLET ORAL DAILY
Qty: 30 TABLET | Refills: 11 | Status: SHIPPED | OUTPATIENT
Start: 2024-04-05 | End: 2025-04-04

## 2024-05-03 ENCOUNTER — LAB VISIT (OUTPATIENT)
Dept: LAB | Facility: HOSPITAL | Age: 29
End: 2024-05-03
Attending: OBSTETRICS & GYNECOLOGY
Payer: MEDICAID

## 2024-05-03 ENCOUNTER — ROUTINE PRENATAL (OUTPATIENT)
Dept: OBSTETRICS AND GYNECOLOGY | Facility: CLINIC | Age: 29
End: 2024-05-03
Payer: MEDICAID

## 2024-05-03 VITALS
SYSTOLIC BLOOD PRESSURE: 124 MMHG | DIASTOLIC BLOOD PRESSURE: 78 MMHG | HEART RATE: 96 BPM | BODY MASS INDEX: 30.08 KG/M2 | WEIGHT: 180.75 LBS

## 2024-05-03 DIAGNOSIS — O09.291 PRIOR POOR OBSTETRICAL HISTORY IN FIRST TRIMESTER, ANTEPARTUM: ICD-10-CM

## 2024-05-03 DIAGNOSIS — Z98.891 HISTORY OF 2 CESAREAN SECTIONS: ICD-10-CM

## 2024-05-03 DIAGNOSIS — Z3A.01 6 WEEKS GESTATION OF PREGNANCY: ICD-10-CM

## 2024-05-03 DIAGNOSIS — Z3A.11 11 WEEKS GESTATION OF PREGNANCY: Primary | ICD-10-CM

## 2024-05-03 LAB
BASOPHILS # BLD AUTO: 0.04 K/UL (ref 0–0.2)
BASOPHILS NFR BLD: 0.6 % (ref 0–1.9)
DIFFERENTIAL METHOD BLD: ABNORMAL
EOSINOPHIL # BLD AUTO: 0.1 K/UL (ref 0–0.5)
EOSINOPHIL NFR BLD: 1 % (ref 0–8)
ERYTHROCYTE [DISTWIDTH] IN BLOOD BY AUTOMATED COUNT: 13.2 % (ref 11.5–14.5)
FERRITIN SERPL-MCNC: 129 NG/ML (ref 20–300)
HAV IGM SERPL QL IA: NORMAL
HBV CORE IGM SERPL QL IA: NORMAL
HBV SURFACE AG SERPL QL IA: NORMAL
HCT VFR BLD AUTO: 41.6 % (ref 37–48.5)
HCV AB SERPL QL IA: NORMAL
HGB BLD-MCNC: 13.9 G/DL (ref 12–16)
HIV 1+2 AB+HIV1 P24 AG SERPL QL IA: NORMAL
IMM GRANULOCYTES # BLD AUTO: 0.02 K/UL (ref 0–0.04)
IMM GRANULOCYTES NFR BLD AUTO: 0.3 % (ref 0–0.5)
LYMPHOCYTES # BLD AUTO: 1.4 K/UL (ref 1–4.8)
LYMPHOCYTES NFR BLD: 19.9 % (ref 18–48)
MCH RBC QN AUTO: 30.2 PG (ref 27–31)
MCHC RBC AUTO-ENTMCNC: 33.4 G/DL (ref 32–36)
MCV RBC AUTO: 90 FL (ref 82–98)
MONOCYTES # BLD AUTO: 0.3 K/UL (ref 0.3–1)
MONOCYTES NFR BLD: 4.3 % (ref 4–15)
NEUTROPHILS # BLD AUTO: 5.3 K/UL (ref 1.8–7.7)
NEUTROPHILS NFR BLD: 73.9 % (ref 38–73)
NRBC BLD-RTO: 0 /100 WBC
PLATELET # BLD AUTO: 213 K/UL (ref 150–450)
PMV BLD AUTO: 10.6 FL (ref 9.2–12.9)
RBC # BLD AUTO: 4.61 M/UL (ref 4–5.4)
WBC # BLD AUTO: 7.22 K/UL (ref 3.9–12.7)

## 2024-05-03 PROCEDURE — 36415 COLL VENOUS BLD VENIPUNCTURE: CPT | Mod: PO | Performed by: OBSTETRICS & GYNECOLOGY

## 2024-05-03 PROCEDURE — 80074 ACUTE HEPATITIS PANEL: CPT | Performed by: OBSTETRICS & GYNECOLOGY

## 2024-05-03 PROCEDURE — 86787 VARICELLA-ZOSTER ANTIBODY: CPT | Performed by: OBSTETRICS & GYNECOLOGY

## 2024-05-03 PROCEDURE — 99214 OFFICE O/P EST MOD 30 MIN: CPT | Mod: TH,S$PBB,, | Performed by: OBSTETRICS & GYNECOLOGY

## 2024-05-03 PROCEDURE — 87389 HIV-1 AG W/HIV-1&-2 AB AG IA: CPT | Performed by: OBSTETRICS & GYNECOLOGY

## 2024-05-03 PROCEDURE — 99999 PR PBB SHADOW E&M-EST. PATIENT-LVL III: CPT | Mod: PBBFAC,,, | Performed by: OBSTETRICS & GYNECOLOGY

## 2024-05-03 PROCEDURE — 85025 COMPLETE CBC W/AUTO DIFF WBC: CPT | Performed by: OBSTETRICS & GYNECOLOGY

## 2024-05-03 PROCEDURE — 86762 RUBELLA ANTIBODY: CPT | Performed by: OBSTETRICS & GYNECOLOGY

## 2024-05-03 PROCEDURE — 86592 SYPHILIS TEST NON-TREP QUAL: CPT | Performed by: OBSTETRICS & GYNECOLOGY

## 2024-05-03 PROCEDURE — 83021 HEMOGLOBIN CHROMOTOGRAPHY: CPT | Performed by: OBSTETRICS & GYNECOLOGY

## 2024-05-03 PROCEDURE — 82728 ASSAY OF FERRITIN: CPT | Performed by: OBSTETRICS & GYNECOLOGY

## 2024-05-03 PROCEDURE — 99213 OFFICE O/P EST LOW 20 MIN: CPT | Mod: PBBFAC,TH,PO | Performed by: OBSTETRICS & GYNECOLOGY

## 2024-05-03 NOTE — PROGRESS NOTES
Prenatal Note   Chief Complaint:  Routine Prenatal Visit       Patient ID: Vira Lang is a  28 y.o. female.    Date: 2024    TODAY'S PROGRESS NOTE    S/ 28 y.o. y.o.  at 11-2/7 weeks who presents for routine prenatal evaluation.    She denies vaginal bleeding, signs of rupture of membranes or significant uterine contractions.    As gestational age has been confirmed the patient does desire aneuploidy and genetic testing.      From an OB standpoint she is currently doing well.    O/  VITALS:  Weight:  181 lb  BP:  124/78    Using the hand-held V scan ultrasound a single intrauterine pregnancy at approximately 11 weeks was visualized with positive fetal movement and positive fetal cardiac activity    A/P  Intrauterine pregnancy at 11-2/7 weeks  History of term stillborn  History of preeclampsia  History of  section x2    The above was reviewed discussed with the patient.  We once again discussed her previous obstetrical history.    Results of the patient's recent prenatal labs were reviewed.  The patient has elected to proceed with carrier and aneuploidy screening.  We once again discussed the fact that these are screening rather than diagnostic test.  Prescription information for [facility] was provided.    In light of the patient's obstetrical issue a referral to Maternal-Fetal Medicine we placed for recommendations regarding antepartum care and management.    From an OB standpoint the patient is currently doing well and without significant complaints.      The patient's questions regarding the above were answered and she is in agreement with the current plan.    See below for additional documentation     OB PROBLEM LIST:  History of term stillborn  History of preeclampsia  History of  section x2     FINAL EDC:    2024 by US done 2024      SO Name: Sushil Ybarra ANATOMY SCAN:    SIZE ___ DATES  ANATOMY: ___  PLACENTA: ___  OTHER: ___     INITIAL LABS:  Date:  5/3/2024  Type and screen: A (+) / Negative  RPR: Negative   Rubella: Immune   Hepatitis panel:  Negative   HIV: Negative   Urine culture: No Growth   CBC: 7.2 > 13.9 / 41.6 < 213  Varicella: Immune   Hemoglobin electrophoresis: No Abnormalities     Ferritin: 129     10-12 WEEK LABS:    PAP: PAP neg / Date:  2023    AGUSTINA/CHLAM: Negative / Negative    cfDNA (Hstdbgxz12) & CARRIER SCREEN (Inheritest Core):  Patient considering.  Pending appropriate gestational age   28 WEEK LABS:    CBC:   1Hr OGTT:  Ferritin:      OTHER LABS:  GBS: ___   OTHER ULTRASOUNDS:     TO-DO THROUGHOUT PREGNANCY:    Depression Screen (20wks): ___    Rhogam: ___  Influenza vaccine (OCT-MAY): ___  TDAP (27-36wks): ___    Birth Plan: ___  Plans to breastfeed: ___  Plans for epidural: ___  Classes at hospital: ___    Postpartum contraception: ___  Consent for delivery: ___  TOLAC counseling: ___  BTL counseling: ___   MEDICATIONS:    Prenatal vitamins   Vitamin B6 ALLERGIES:    Iodine (rash in wheezing)    MEDICAL HISTORY:    History gestational hypertension  Asthma   Depression/anxiety/PTSD    Pre-pregnancy BMI = 32 SURGICAL HISTORY:     section   Dilation and curettage    SOCIAL HISTORY:    Smoker: non-smoker  Alcohol: yes but not since +HCG  Drugs: denies  Relationship:  3/21/2024  Domestic Violence: no  Lives with:    Education Level: Some College  Occupation: homemaker  Yarsanism:  Yarsani  Other:   GYN HISTORY:    PAP'S:  Reported history of abnormal Pap smear but abnormality not recalled  STI'S: no past history  GENITAL HSV: no FAMILY HISTORY:    HTN: Yes - mother and father  DIABETES: Yes - father  BLEEDING D/O: Yes - mother  CLOTTING D/O: Yes - father  BIRTH DEFECTS: No  MENTAL DISABILITY: No  TWINS OR MORE: No  GYN CANCER: Yes - mother  Other:     OBSTETRIC HISTORY   Month/Year Mode of Delivery EGA Wt. 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   No history of diabetes   2017  section 40  Female Failed induction of labor   2015  section 26  Female Preeclampsia        First-trimester SAB x4             Plan:      11 weeks gestation of pregnancy  -     [id number] Plus W/ Ess & Sca T21, T18, T13, Xy & Ess; Future; Expected date: 2024  -     Ambulatory referral/consult to Perinatology; Future; Expected date: 2024    Prior poor obstetrical history in first trimester, antepartum  -     Ambulatory referral/consult to Perinatology; Future; Expected date: 2024    History of 2  sections  -     Ambulatory referral/consult to Perinatology; Future; Expected date: 2024         No follow-ups on file.     Cameron Stringer MD  Department OBGYN  [facility] US OB/GYN     Indication  ========  Indication: Estimation of Gestational Age    History  ======  Previous Outcomes   3  Para 2  Pregnancies delivered at term (T) 1  Pregnancies delivered  (P) 1    Pregnancy  =========  Martinez pregnancy. Number of embryos: 1    Dating  ======  Ultrasound examination on: 2024  GA by U/S based upon: CRL  GA by U/S 6 w + 5 d  OALMIDE by U/S: 2024  Assigned: based on ultrasound (CRL), selected on 2024  Assigned GA 6 w + 5 d  Assigned OLAMIDE: 2024    Assessment  ==========  Gestational sac: visualized  Location: intrauterine  Yolk sac: visualized  Amniotic sac: uncertain  Embryo: visualized  CRL 8.1 mm 6w 5d Hadlock  Cardiac activity: present   bpm  Other: Subchorionic hemorrhage 5 x 5 x 17 mm and 3 x 6 x 3 mm    Maternal Structures  ===============  Uterus / Cervix  Uterus: Normal  Cervix: Visualized  Approach: Transvaginal  Ovaries / Tubes / Adnexa  Rt ovary: Normal  Rt ovary D1 28 mm  Rt ovary D2 30 mm  Rt ovary D3 32 mm  Rt ovary Vol 13.9 cmÂ³  Rt ovarian cyst(s): Cysts identified  Rt ovarian cyst D1 21 mm  Rt ovarian cyst D2 16 mm  Rt ovarian cyst D3 24 mm  Rt ovarian cyst mean  20.3 mm  Rt ovarian cyst vol 4.222 cmÂ³  Rt ovarian cyst findings: Complex cyst, corpus luteum  Lt ovary: Normal  Lt ovary D1 22 mm  Lt ovary D2 23 mm  Lt ovary D3 10 mm  Lt ovary Vol 2.8 cmÂ³  Cul de Sac / Bladder / Kidneys / Other  Free fluid: Free fluid visualized  Amount of free fluid: mild    Impression  =========  A herman living IUP is identified. OLAMIDE is assigned based on the CRL from today?s study. If other clinical data, such as IVF conception dating or prior ultrasound  assignment of OLAMIDE differs from the OLAMIDE assigned today, please contact the [facility] department so that this report can be revised to reflect the correct OLAMIDE.  An area of hemorrhage was seen in the subchorionic region of the placenta.  The maternal adnexae are normal in appearance. The amount of free fluid seen in the pelvis is within normal physiologic range.

## 2024-05-04 LAB — RPR SER QL: NORMAL

## 2024-05-06 LAB
RUBV IGG SER-ACNC: 27.7 IU/ML
RUBV IGG SER-IMP: REACTIVE
VARICELLA INTERPRETATION: NEGATIVE
VARICELLA ZOSTER IGG: 112.6

## 2024-05-07 LAB
HGB A2 MFR BLD HPLC: 2.8 % (ref 2.2–3.2)
HGB FRACT BLD ELPH-IMP: NORMAL
HGB FRACT BLD ELPH-IMP: NORMAL

## 2024-05-08 ENCOUNTER — PATIENT MESSAGE (OUTPATIENT)
Dept: ADMINISTRATIVE | Facility: OTHER | Age: 29
End: 2024-05-08
Payer: MEDICAID

## 2024-05-09 ENCOUNTER — TELEPHONE (OUTPATIENT)
Dept: OBSTETRICS AND GYNECOLOGY | Facility: CLINIC | Age: 29
End: 2024-05-09
Payer: MEDICAID

## 2024-05-09 NOTE — TELEPHONE ENCOUNTER
[facility] Rep contacted the office stating Cancelling Inheritest Core Panel ,EDTA tube was not received,  Cancel Request Faxed to office.

## 2024-05-11 ENCOUNTER — PATIENT MESSAGE (OUTPATIENT)
Dept: OBSTETRICS AND GYNECOLOGY | Facility: CLINIC | Age: 29
End: 2024-05-11
Payer: MEDICAID

## 2024-05-11 NOTE — PATIENT INSTRUCTIONS
Have a question or concern?    PRO TIP: Program these phone numbers in your cell phone!    for an emergency  call 911 or go to the nearest hospital Especially after 20 weeks of the pregnancy, please remember that  Labor & Delivery is at [facility].  There is no L&D at [facility] (formerly called [facility]).   [facility] Nurse Care Advice Line  [phone] At any time during your pregnancy,  you can speak to a nurse 24-7.   for non-urgent issues, send us a  message in [employer] Consider calling the Nurse Care Advice Line if it's a weekend or  toward the end of the work-day since  [employer] and phone messages may NOT be answered for a day or two.   for non-urgent issues, call the clinic  [phone], Option 3    Labor & Delivery  [phone] Starting at 20 weeks of the pregnancy,  you can speak to a nurse on L&D 24-7.       PLEASE BE AWARE THAT THE BACK ENTRANCE BY LABOR AND DELIVERY (THE BLUE AWNING) IS ONLY OPEN UNTIL 6:00 P.M..  AFTER 6:00 P.M. PLEASE ENTER THROUGH THE EMERGENCY ROOM AT [facility].      FIRST TRIMESTER  0 - 13+6 weeks    Adapting to Pregnancy: First Trimester   As your body adjusts, you may have to change or limit your daily activities. You'll need more rest. You may also need to use the energy you have more wisely.     Your Changing Body   Almost every part of your body is affected as you adapt to pregnancy. You may not look very pregnant during the first three months, but you are likely to have some common signs of early pregnancy: Nausea, Fatigue, Frequent urination, Mood swings, Bloating of the abdomen, Nipple or breast tenderness, Breast swelling.    It's Not Too Late to Start Good Habits   What matters most is protecting your baby from this moment on. If you smoke, drink alcohol, or use drugs, now is the time to stop. If you need help, talk with your health care provider.   Smoking increases the risk of miscarriage or having a low-birth-weight baby. If you smoke,  quit now.   Alcohol and drugs like marijuana have been linked to miscarriage, birth defects, intellectual disability, and low birth weight. Do not drink alcohol or use drugs.    Tips to Relieve Nausea   There's lots more information in your OB Welcome Packet, but here are a few ideas:  Eat small, light meals at frequent intervals.   Get up slowly. Eat a few unsalted crackers before you get out of bed.   Drink water with lemon slices.   Eat an ice pop in your favorite flavor.   Ask your health care provider about taking yael or vitamin B6 for nausea and vomiting.   Talk with your health care provider if you take vitamins that upset your stomach.    Advice for Travel   Talk to your health care provider first, but the second trimester may be the best time for travel. You may be advised to avoid certain trips while you're pregnant. Food and water can be concerns in developing countries. Travel by car is a good choice since you can stop, get out, and stretch (should be done at least every 2 hours). Bring snacks and water along. Fasten the lap belt below your belly, low over your hips. Also be sure to wear the shoulder harness.   We usually recommend no flying beyond 35 completed weeks (35+6).    Intimacy   Unless your health care provider tells you to, there is no reason to stop having sex while you're pregnant. You or your partner may notice changes in desire. Desire may be less in the first trimester, due to nausea and fatigue. In the second trimester, sex may be very enjoyable. The third trimester can be a challenge comfort-wise. Try different positions and see what's best for you.    Baby!  Major organs and nervous system are developing, the heart starts beating, lungs begin development, bones appear, all the little parts start to form (head, face, eyes, ears, arms, fingers, legs, and toes), hair starts to grow, and 20 tooth-buds develop.

## 2024-05-14 ENCOUNTER — TELEPHONE (OUTPATIENT)
Dept: MATERNAL FETAL MEDICINE | Facility: CLINIC | Age: 29
End: 2024-05-14

## 2024-05-14 DIAGNOSIS — Z36.89 ENCOUNTER FOR ULTRASOUND TO ASSESS FETAL GROWTH: ICD-10-CM

## 2024-05-14 DIAGNOSIS — O09.299 HISTORY OF PRE-ECLAMPSIA IN PRIOR PREGNANCY, CURRENTLY PREGNANT: Primary | ICD-10-CM

## 2024-05-28 ENCOUNTER — ROUTINE PRENATAL (OUTPATIENT)
Dept: OBSTETRICS AND GYNECOLOGY | Facility: CLINIC | Age: 29
End: 2024-05-28
Payer: MEDICAID

## 2024-05-28 VITALS
WEIGHT: 177.25 LBS | DIASTOLIC BLOOD PRESSURE: 76 MMHG | SYSTOLIC BLOOD PRESSURE: 128 MMHG | BODY MASS INDEX: 29.5 KG/M2 | HEART RATE: 98 BPM

## 2024-05-28 DIAGNOSIS — Z98.891 HISTORY OF 2 CESAREAN SECTIONS: ICD-10-CM

## 2024-05-28 DIAGNOSIS — Z3A.14 14 WEEKS GESTATION OF PREGNANCY: Primary | ICD-10-CM

## 2024-05-28 DIAGNOSIS — O99.342 ANXIETY DURING PREGNANCY, ANTEPARTUM, SECOND TRIMESTER: ICD-10-CM

## 2024-05-28 DIAGNOSIS — O09.291 PRIOR POOR OBSTETRICAL HISTORY IN FIRST TRIMESTER, ANTEPARTUM: ICD-10-CM

## 2024-05-28 DIAGNOSIS — F41.9 ANXIETY DURING PREGNANCY, ANTEPARTUM, SECOND TRIMESTER: ICD-10-CM

## 2024-05-28 PROCEDURE — 99999 PR PBB SHADOW E&M-EST. PATIENT-LVL III: CPT | Mod: PBBFAC,,, | Performed by: OBSTETRICS & GYNECOLOGY

## 2024-05-28 PROCEDURE — 99213 OFFICE O/P EST LOW 20 MIN: CPT | Mod: PBBFAC,TH,PO | Performed by: OBSTETRICS & GYNECOLOGY

## 2024-05-28 PROCEDURE — 99214 OFFICE O/P EST MOD 30 MIN: CPT | Mod: TH,S$PBB,, | Performed by: OBSTETRICS & GYNECOLOGY

## 2024-05-28 RX ORDER — BUSPIRONE HYDROCHLORIDE 5 MG/1
5 TABLET ORAL 2 TIMES DAILY PRN
Qty: 60 TABLET | Refills: 11 | Status: SHIPPED | OUTPATIENT
Start: 2024-05-28 | End: 2025-05-28

## 2024-05-28 NOTE — PROGRESS NOTES
Prenatal Note   Chief Complaint:  Routine Prenatal Visit       Patient ID: Vira Lang is a  28 y.o. female.    Date: 2024    TODAY'S PROGRESS NOTE    S/ 28 y.o. y.o.  at 14-6/7 weeks who presents for routine prenatal evaluation.    She denies vaginal bleeding, signs of rupture of membranes or pelvic pain.    Her only issue at this time is reportedly stable depression on Lexapro but increasing issues with anxiety related to her obstetrical history.    O/  VITALS:  Weight:  177 lb  BP:  128/76    Fundal height: 14 cm   Fetal cardiac activity: 150s with hand-held Doppler    A/P  Intrauterine pregnancy at 14-6/7 weeks  History of term term stillborn  History of preeclampsia with delivery at 26 weeks  History of  section x 2  Depression/anxiety    The above was reviewed discussed with the patient.    We discussed the reassuring low risk findings on her aneuploidy screening.    The patient's past obstetrical history was once again reviewed discussed.  She is scheduled to be seen by Maternal-Fetal Medicine on 2024.      We discussed the patient's history of depression and recent worsening episodes of anxiety.  Options reviewed and in addition to the patient's Lexapro she will be started on BuSpar 5 mg twice daily as needed for anxiety.  The pros, cons, risks, benefits, alternatives and indications of the medication(s) prescribed, as well as appropriate use and potential side effects were discussed.  We discussed the fact that there are potential issues with nearly all medications in pregnancy.  These include but are not limited to birth defects, pregnancy loss, prematurity, infant death, or developmental disabilities.     We discussed the plans for increased antepartum testing beginning at 32 weeks unless Maternal-Fetal Medicine adds additional recommendations at their evaluation.    The patient's questions regarding the above were answered and she is in agreement with the current  plan.   OB PROBLEM LIST:  History of term term stillborn   [ ] Twice weekly antepartum testing starting at 32 weeks  History of preeclampsia with delivery at 26 weeks  History of  section x 2  Depression/anxiety: Currently on Lexapro and BuSpar          FINAL EDC:    2024 by US done 2024      SO Name: Sushil Ybarra ANATOMY SCAN:    SIZE ___ DATES  ANATOMY: ___  PLACENTA: ___  OTHER: ___     INITIAL LABS:  Date: 5/3/2024  Type and screen: A (+) / Negative  RPR: Negative   Rubella: Immune   Hepatitis panel:  Negative   HIV: Negative   Urine culture: No Growth   CBC: 7.2 > 13.9 / 41.6 < 213  Varicella: Immune   Hemoglobin electrophoresis: No Abnormalities     Ferritin: 129     10-12 WEEK LABS:    PAP: PAP neg / Date:  2023    AGUSTINA/CHLAM: Negative / Negative    cfDNA ([id number]):  Low risk.  XY    CARRIER SCREEN (Inheritest Core):  Pending   28 WEEK LABS:    CBC:   1Hr OGTT:  Ferritin:      OTHER LABS:  GBS: ___   OTHER ULTRASOUNDS:     TO-DO THROUGHOUT PREGNANCY:    Depression Screen (20wks): ___    Rhogam: ___  Influenza vaccine (OCT-MAY): ___  TDAP (27-36wks): ___    Birth Plan: ___  Plans to breastfeed: ___  Plans for epidural: ___  Classes at hospital: ___    Postpartum contraception: ___  Consent for delivery: ___  TOLAC counseling: ___  BTL counseling: ___   MEDICATIONS:    Prenatal vitamins   Vitamin B6 ALLERGIES:    Iodine (rash in wheezing)    MEDICAL HISTORY:    History gestational hypertension  Asthma   Depression/anxiety/PTSD    Pre-pregnancy BMI = 32 SURGICAL HISTORY:     section   Dilation and curettage    SOCIAL HISTORY:    Smoker: non-smoker  Alcohol: yes but not since +HCG  Drugs: denies  Relationship:  3/21/2024  Domestic Violence: no  Lives with:    Education Level: Some College  Occupation: homemaker  Sikh:  Mu-ism  Other:   GYN HISTORY:    PAP'S:  Reported history of abnormal Pap smear but abnormality not recalled  STI'S: no past history  GENITAL  HSV: no FAMILY HISTORY:    HTN: Yes - mother and father  DIABETES: Yes - father  BLEEDING D/O: Yes - mother  CLOTTING D/O: Yes - father  BIRTH DEFECTS: No  MENTAL DISABILITY: No  TWINS OR MORE: No  GYN CANCER: Yes - mother  Other:     OBSTETRIC HISTORY   Month/Year Mode of Delivery EGA Wt. 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  No history of diabetes   2017  section 40  Female Failed induction of labor   2015  section 26  Female Preeclampsia        First-trimester SAB x4             Plan:      14 weeks gestation of pregnancy    Anxiety during pregnancy, antepartum, second trimester  -     busPIRone (BUSPAR) 5 MG Tab; Take 1 tablet (5 mg total) by mouth 2 (two) times daily as needed (Anxiety). As needed for anxiety  Dispense: 60 tablet; Refill: 11    Prior poor obstetrical history in first trimester, antepartum    History of 2  sections       Follow up in about 4 weeks (around 2024) for Routine OB F/U or as needed.     Cameron Stringer MD  Department OBGYN  [facility]

## 2024-06-04 ENCOUNTER — TELEPHONE (OUTPATIENT)
Dept: MATERNAL FETAL MEDICINE | Facility: CLINIC | Age: 29
End: 2024-06-04
Payer: MEDICAID

## 2024-06-04 NOTE — TELEPHONE ENCOUNTER
Called pt to reschedule her appt, Dr. Castillo determined that we can see her for her anatomy scan instead of early viability. Reschedule complete and pt confirmed verbal understanding of new appt time.

## 2024-06-05 ENCOUNTER — PATIENT MESSAGE (OUTPATIENT)
Dept: OBSTETRICS AND GYNECOLOGY | Facility: CLINIC | Age: 29
End: 2024-06-05
Payer: MEDICAID

## 2024-06-05 ENCOUNTER — PATIENT MESSAGE (OUTPATIENT)
Dept: OTHER | Facility: OTHER | Age: 29
End: 2024-06-05
Payer: MEDICAID

## 2024-06-05 ENCOUNTER — TELEPHONE (OUTPATIENT)
Dept: OBSTETRICS AND GYNECOLOGY | Facility: CLINIC | Age: 29
End: 2024-06-05
Payer: MEDICAID

## 2024-06-05 NOTE — TELEPHONE ENCOUNTER
Pt states she thinks her b/p was elevated because she was stressed with the kids running around. She is also taking care of her  who recently had surgery. Pt also states that the machine has been really inconsistent.  Her repeat b/p on a different machine she has at home was 120/72.

## 2024-06-05 NOTE — TELEPHONE ENCOUNTER
----- Message from Cameron Stringer MD sent at 6/5/2024  7:46 AM CDT -----  Regarding: BP  This patient had a reported elevated blood pressure last night on connect mom's system.  Give her a call.  If it was a true value have her come in today for blood pressure check.   Home

## 2024-06-06 NOTE — TELEPHONE ENCOUNTER
----- Message from Cameron Stringer MD sent at 6/6/2024  9:02 AM CDT -----  Regarding: Blood pressure issues  Please contact this patient and let her know that the mom connection once again notified us by an elevated blood pressure.  Find out if she is having any issues and move her appointment one week sooner to next week so we can get her in and evaluate blood pressures and find out if there is a problem.  If she is having any issues let me know

## 2024-06-07 NOTE — TELEPHONE ENCOUNTER
Spoke with pt  concerning elevated BP readings, scheduled pt appt with Dr. Stringer 06/12, educated pt to go to the ED if pt starts to show S/S of high BP, pt verbalized understanding

## 2024-06-12 ENCOUNTER — PATIENT MESSAGE (OUTPATIENT)
Dept: OTHER | Facility: OTHER | Age: 29
End: 2024-06-12
Payer: MEDICAID

## 2024-06-12 ENCOUNTER — ROUTINE PRENATAL (OUTPATIENT)
Dept: OBSTETRICS AND GYNECOLOGY | Facility: CLINIC | Age: 29
End: 2024-06-12
Payer: MEDICAID

## 2024-06-12 VITALS
SYSTOLIC BLOOD PRESSURE: 128 MMHG | BODY MASS INDEX: 29.53 KG/M2 | HEART RATE: 97 BPM | DIASTOLIC BLOOD PRESSURE: 68 MMHG | WEIGHT: 177.5 LBS

## 2024-06-12 DIAGNOSIS — F41.9 ANXIETY DURING PREGNANCY, ANTEPARTUM, SECOND TRIMESTER: ICD-10-CM

## 2024-06-12 DIAGNOSIS — Z98.891 HISTORY OF 2 CESAREAN SECTIONS: ICD-10-CM

## 2024-06-12 DIAGNOSIS — Z3A.17 17 WEEKS GESTATION OF PREGNANCY: Primary | ICD-10-CM

## 2024-06-12 DIAGNOSIS — O09.292 PRIOR POOR OBSTETRICAL HISTORY IN SECOND TRIMESTER, ANTEPARTUM: ICD-10-CM

## 2024-06-12 DIAGNOSIS — O99.342 ANXIETY DURING PREGNANCY, ANTEPARTUM, SECOND TRIMESTER: ICD-10-CM

## 2024-06-12 PROCEDURE — 99999 PR PBB SHADOW E&M-EST. PATIENT-LVL III: CPT | Mod: PBBFAC,,, | Performed by: OBSTETRICS & GYNECOLOGY

## 2024-06-12 PROCEDURE — 99213 OFFICE O/P EST LOW 20 MIN: CPT | Mod: PBBFAC,TH,PO | Performed by: OBSTETRICS & GYNECOLOGY

## 2024-06-12 NOTE — PROGRESS NOTES
Prenatal Note   Chief Complaint:  No chief complaint on file.       Patient ID: Vira Ybarra is a  28 y.o. female.    Date: 2024    TODAY'S PROGRESS NOTE    S/ 28 y.o. y.o.  at 17-0/7 weeks who presents for routine prenatal evaluation.    She denies vaginal bleeding, signs of rupture of membranes or pelvic pain.    Her depression remained stable on Lexapro and occasional BuSpar.    She reports she is scheduled to see Maternal-Fetal Medicine on 2024 with follow-up.    Recently she has had a couple of elevated blood pressures noted by the mom connect system.  The patient feels that the blood pressure cuff is not working appropriately.  When she repeats it with her mother-in-law's cuff her blood pressures are within normal limits.    O/  VITALS:  Weight:  177 lb  BP:  128/68    Fundal height: 17 cm   Fetal cardiac activity: 150s with hand-held Doppler    A/P  Intrauterine pregnancy at 17-0/7 weeks  History of term term stillborn  History of preeclampsia with delivery at 26 weeks  History of  section x 2  Depression/anxiety    The above was reviewed discussed with the patient and her significant other.    We discussed the reassuring low risk findings on her aneuploidy screening.    Currently there are no signs or symptoms of preeclampsia  labor other issues present.    We discussed the plans for repeat  section.  We will await Maternal-Fetal evaluation in regards to timing of the procedure.    From an obstetrical standpoint the patient is currently doing well without complaints.      The patient and significant other's questions regarding the above were answered and they are in agreement with the current plan.   OB PROBLEM LIST:  History of term term stillborn   [ ] Twice weekly antepartum testing starting at 32 weeks  History of preeclampsia with delivery at 26 weeks  History of  section x 2  Depression/anxiety: Currently on Lexapro and BuSpar           FINAL EDC:    2024 by US done 2024      SO Name: Sushil Ybarra ANATOMY SCAN:    SIZE ___ DATES  ANATOMY: ___  PLACENTA: ___  OTHER: ___     INITIAL LABS:  Date: 5/3/2024  Type and screen: A (+) / Negative  RPR: Negative   Rubella: Immune   Hepatitis panel:  Negative   HIV: Negative   Urine culture: No Growth   CBC: 7.2 > 13.9 / 41.6 < 213  Varicella: Immune   Hemoglobin electrophoresis: No Abnormalities     Ferritin: 129     10-12 WEEK LABS:    PAP: PAP neg / Date:  2023    AGUSTINA/CHLAM: Negative / Negative    cfDNA ([id number]):  Low risk.  XY    CARRIER SCREEN (Inheritest Core):  Pending   28 WEEK LABS:    CBC:   1Hr OGTT:  Ferritin:      OTHER LABS:  GBS: ___   OTHER ULTRASOUNDS:     TO-DO THROUGHOUT PREGNANCY:    Depression Screen (20wks): ___    Rhogam: ___  Influenza vaccine (OCT-MAY): ___  TDAP (27-36wks): ___    Birth Plan: ___  Plans to breastfeed: ___  Plans for epidural: ___  Classes at hospital: ___    Postpartum contraception: ___  Consent for delivery: ___  TOLAC counseling: ___  BTL counseling: ___   MEDICATIONS:    Prenatal vitamins   Vitamin B6 ALLERGIES:    Iodine (rash in wheezing)    MEDICAL HISTORY:    History gestational hypertension  Asthma   Depression/anxiety/PTSD    Pre-pregnancy BMI = 32 SURGICAL HISTORY:     section   Dilation and curettage    SOCIAL HISTORY:    Smoker: non-smoker  Alcohol: yes but not since +HCG  Drugs: denies  Relationship:  3/21/2024  Domestic Violence: no  Lives with:    Education Level: Some College  Occupation: homemaker  Christianity:  Yazdanism  Other:   GYN HISTORY:    PAP'S:  Reported history of abnormal Pap smear but abnormality not recalled  STI'S: no past history  GENITAL HSV: no FAMILY HISTORY:    HTN: Yes - mother and father  DIABETES: Yes - father  BLEEDING D/O: Yes - mother  CLOTTING D/O: Yes - father  BIRTH DEFECTS: No  MENTAL DISABILITY: No  TWINS OR MORE: No  GYN CANCER: Yes - mother  Other:     OBSTETRIC HISTORY    Month/Year Mode of Delivery EGA Wt. 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  No history of diabetes   2017  section 40  Female Failed induction of labor   2015  section 26  Female Preeclampsia        First-trimester SAB x4             Plan:      17 weeks gestation of pregnancy    Anxiety during pregnancy, antepartum, second trimester    Prior poor obstetrical history in second trimester, antepartum    History of 2  sections         Follow up in about 2 weeks (around 2024) for Routine OB F/U or as needed.     Cameron Stringer MD  Department OBGYN  [facility]

## 2024-06-12 NOTE — PATIENT INSTRUCTIONS
Have a question or concern?    PRO TIP: Program these phone numbers in your cell phone!    for an emergency  call 911 or go to the nearest hospital Especially after 20 weeks of the pregnancy, please remember that  Labor & Delivery is at [facility].  There is no L&D at [facility] ([facility]   [facility] Nurse Care Advice Line  [phone] At any time during your pregnancy,  you can speak to a nurse -.   for non-urgent issues, send us a  message in Scicasts Consider calling the Nurse Care Advice Line if it's a weekend or  toward the end of the work-day since  Scicasts and phone messages may NOT be answered for a day or two.   for non-urgent issues, call the clinic  [phone], Option 3    Labor & Delivery  [phone] Starting at 20 weeks of the pregnancy,  you can speak to a nurse on L&D -.       For information regarding Tours of labor and delivery, Big brother / Big sister classes, Baby Love Classes (preparing for the arrival of your [place]), Lamaze (Labor breathing) classes and Breastfeeding information please call -> [phone]    PLEASE BE AWARE THAT THE BACK ENTRANCE BY LABOR AND DELIVERY (THE BLUE AWNING) IS ONLY OPEN UNTIL 6:00 P.M..    AFTER 6:00 P.M. PLEASE ENTER THROUGH THE EMERGENCY ROOM AT Critical access hospital.      SECOND TRIMESTER  14+0 - 28+6 weeks    Adapting to Pregnancy: Second Trimester   Keep up the healthy habits you started in your first trimester.  It's not too late to make better choices and drop bad habits - your baby's counting on you!  Most women enjoy this middle part of the pregnancy: first trimester fatigue and morning sickness are probably behind you, and your belly's not yet getting in the way physically.    Your To-Do List  There are several things you should start working on.  More information on these topics can be found in your OB Welcome Packet and the A-Z Book.    Choose a pediatrician for your baby.  Sign up for a tour and  classes at the hospital.  All classes are free of charge if you are delivering at [facility].  Baby Love (learn about the delivery process and caring for a )  Big Brother / Big Sister Class (to help siblings prepare for baby)  Lamaze (a 4 week class to learn about natural interventions for labor)  Breastfeeding (get a head start learning about breastfeeding)  Work on some methods for coping with the pains of labor.  A good bit of labor happens before you can get an epidural, and you'll feel more confident if you have a plan that you've practiced.  We encourage everyone to breastfeed if they can (and most women can!).  Please ask us questions if you have them.  Decide what you'd like to use for contraception (birth control) after this baby is born.  If you're having a boy, let us know if you plan to have him circumcised.    Pregnancy Milestones  After week 16, avoid lying on your back for more than a few minutes. Instead, lie on your side and switch sides often.  Between 19 and 22 weeks you'll have an ultrasound to look at the baby's anatomy and determine the gender (if you want to know and don't already know). This is around the same time when you should start feeling baby's movements and kicks.  Your gestational diabetes test will be done around 28 weeks.  We'll give you a TDAP booster shot so that your baby is protected from Whooping Cough (pertussis) his/her first few months of life.    When You Travel   The second trimester is a good time for travel. Talk to your health care provider about any special plans you may need to make. Always:   Wear a seat belt. Fasten the lap part under your belly. Wear the shoulder part also.   Take frequent breaks during long trips by car or plane. Move around to stretch your legs.   Drink plenty of fluids on flights. The air in plane cabins is very dry.   Avoid hot climates or high altitudes if you are not used to them.   Avoid places where the food and water might make you  sick.    Intimacy  Unless your health care provider tells you otherwise, it's perfectly fine to continue having sex. In fact, many women find sex in the second trimester quite enjoyable due to increased blood supply to the pelvic area.    Baby!  Organs continue to develop and begin to function, there's formation of eyebrows / eyelashes / fingernails, skin is wrinkled and covered in vernix, genitals develop, a fine hair called lanugo covers the body, and baby is busy: kicking, sleeping, swallowing amniotic fluid, listening to you, passing urine, and sucking those thumbs.

## 2024-06-26 ENCOUNTER — OFFICE VISIT (OUTPATIENT)
Dept: MATERNAL FETAL MEDICINE | Facility: CLINIC | Age: 29
End: 2024-06-26
Payer: MEDICAID

## 2024-06-26 ENCOUNTER — PROCEDURE VISIT (OUTPATIENT)
Dept: MATERNAL FETAL MEDICINE | Facility: CLINIC | Age: 29
End: 2024-06-26
Payer: MEDICAID

## 2024-06-26 VITALS
BODY MASS INDEX: 29.35 KG/M2 | DIASTOLIC BLOOD PRESSURE: 77 MMHG | SYSTOLIC BLOOD PRESSURE: 121 MMHG | WEIGHT: 176.38 LBS | HEART RATE: 75 BPM

## 2024-06-26 DIAGNOSIS — O09.299 HISTORY OF PRE-ECLAMPSIA IN PRIOR PREGNANCY, CURRENTLY PREGNANT: ICD-10-CM

## 2024-06-26 DIAGNOSIS — Z36.89 ENCOUNTER FOR ULTRASOUND TO ASSESS FETAL GROWTH: ICD-10-CM

## 2024-06-26 DIAGNOSIS — O09.291 PRIOR POOR OBSTETRICAL HISTORY IN FIRST TRIMESTER, ANTEPARTUM: ICD-10-CM

## 2024-06-26 DIAGNOSIS — O09.292 HISTORY OF PRE-ECLAMPSIA IN PRIOR PREGNANCY, CURRENTLY PREGNANT IN SECOND TRIMESTER: Primary | ICD-10-CM

## 2024-06-26 DIAGNOSIS — O09.292 PRIOR POOR OBSTETRICAL HISTORY IN SECOND TRIMESTER, ANTEPARTUM: ICD-10-CM

## 2024-06-26 DIAGNOSIS — O09.292 HISTORY OF PRE-ECLAMPSIA IN PRIOR PREGNANCY, CURRENTLY PREGNANT IN SECOND TRIMESTER: ICD-10-CM

## 2024-06-26 DIAGNOSIS — Z98.891 HISTORY OF 2 CESAREAN SECTIONS: ICD-10-CM

## 2024-06-26 DIAGNOSIS — Z3A.11 11 WEEKS GESTATION OF PREGNANCY: ICD-10-CM

## 2024-06-26 DIAGNOSIS — Z36.2 ENCOUNTER FOR FOLLOW-UP ULTRASOUND OF FETAL ANATOMY: Primary | ICD-10-CM

## 2024-06-26 PROCEDURE — 99213 OFFICE O/P EST LOW 20 MIN: CPT | Mod: PBBFAC,TH,PN | Performed by: STUDENT IN AN ORGANIZED HEALTH CARE EDUCATION/TRAINING PROGRAM

## 2024-06-26 PROCEDURE — 99999 PR PBB SHADOW E&M-EST. PATIENT-LVL III: CPT | Mod: PBBFAC,,, | Performed by: STUDENT IN AN ORGANIZED HEALTH CARE EDUCATION/TRAINING PROGRAM

## 2024-06-26 PROCEDURE — 76811 OB US DETAILED SNGL FETUS: CPT | Mod: PBBFAC,PN | Performed by: STUDENT IN AN ORGANIZED HEALTH CARE EDUCATION/TRAINING PROGRAM
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
## 2024-06-27 ENCOUNTER — LAB VISIT (OUTPATIENT)
Dept: LAB | Facility: HOSPITAL | Age: 29
End: 2024-06-27
Attending: STUDENT IN AN ORGANIZED HEALTH CARE EDUCATION/TRAINING PROGRAM
Payer: MEDICAID

## 2024-06-27 ENCOUNTER — ROUTINE PRENATAL (OUTPATIENT)
Dept: OBSTETRICS AND GYNECOLOGY | Facility: CLINIC | Age: 29
End: 2024-06-27
Payer: MEDICAID

## 2024-06-27 VITALS
HEART RATE: 96 BPM | DIASTOLIC BLOOD PRESSURE: 76 MMHG | BODY MASS INDEX: 29.64 KG/M2 | WEIGHT: 178.13 LBS | SYSTOLIC BLOOD PRESSURE: 132 MMHG

## 2024-06-27 DIAGNOSIS — O09.292 HISTORY OF PRE-ECLAMPSIA IN PRIOR PREGNANCY, CURRENTLY PREGNANT IN SECOND TRIMESTER: ICD-10-CM

## 2024-06-27 DIAGNOSIS — O09.292 PRIOR POOR OBSTETRICAL HISTORY IN SECOND TRIMESTER, ANTEPARTUM: ICD-10-CM

## 2024-06-27 DIAGNOSIS — Z98.891 HISTORY OF 2 CESAREAN SECTIONS: ICD-10-CM

## 2024-06-27 DIAGNOSIS — O09.291 PRIOR POOR OBSTETRICAL HISTORY IN FIRST TRIMESTER, ANTEPARTUM: ICD-10-CM

## 2024-06-27 DIAGNOSIS — F41.9 ANXIETY DURING PREGNANCY, ANTEPARTUM, SECOND TRIMESTER: ICD-10-CM

## 2024-06-27 DIAGNOSIS — O99.342 ANXIETY DURING PREGNANCY, ANTEPARTUM, SECOND TRIMESTER: ICD-10-CM

## 2024-06-27 DIAGNOSIS — Z3A.19 19 WEEKS GESTATION OF PREGNANCY: Primary | ICD-10-CM

## 2024-06-27 LAB
ESTIMATED AVG GLUCOSE: 82 MG/DL (ref 68–131)
HBA1C MFR BLD: 4.5 % (ref 4–5.6)

## 2024-06-27 PROCEDURE — 85730 THROMBOPLASTIN TIME PARTIAL: CPT | Performed by: STUDENT IN AN ORGANIZED HEALTH CARE EDUCATION/TRAINING PROGRAM

## 2024-06-27 PROCEDURE — 99213 OFFICE O/P EST LOW 20 MIN: CPT | Mod: PBBFAC,TH,PO | Performed by: OBSTETRICS & GYNECOLOGY

## 2024-06-27 PROCEDURE — 86146 BETA-2 GLYCOPROTEIN ANTIBODY: CPT | Mod: 59 | Performed by: STUDENT IN AN ORGANIZED HEALTH CARE EDUCATION/TRAINING PROGRAM

## 2024-06-27 PROCEDURE — 99999 PR PBB SHADOW E&M-EST. PATIENT-LVL III: CPT | Mod: PBBFAC,,, | Performed by: OBSTETRICS & GYNECOLOGY

## 2024-06-27 PROCEDURE — 83036 HEMOGLOBIN GLYCOSYLATED A1C: CPT | Performed by: STUDENT IN AN ORGANIZED HEALTH CARE EDUCATION/TRAINING PROGRAM

## 2024-06-27 PROCEDURE — 85610 PROTHROMBIN TIME: CPT | Performed by: STUDENT IN AN ORGANIZED HEALTH CARE EDUCATION/TRAINING PROGRAM

## 2024-06-27 PROCEDURE — 85613 RUSSELL VIPER VENOM DILUTED: CPT | Performed by: STUDENT IN AN ORGANIZED HEALTH CARE EDUCATION/TRAINING PROGRAM

## 2024-06-27 PROCEDURE — 86147 CARDIOLIPIN ANTIBODY EA IG: CPT | Performed by: STUDENT IN AN ORGANIZED HEALTH CARE EDUCATION/TRAINING PROGRAM

## 2024-06-27 NOTE — PATIENT INSTRUCTIONS
Have a question or concern?    PRO TIP: Program these phone numbers in your cell phone!    for an emergency  call 911 or go to the nearest hospital Especially after 20 weeks of the pregnancy, please remember that  Labor & Delivery is at [facility].  There is no L&D at [facility] ([facility]   [facility] Nurse Care Advice Line  [phone] At any time during your pregnancy,  you can speak to a nurse -.   for non-urgent issues, send us a  message in [employer] Consider calling the Nurse Care Advice Line if it's a weekend or  toward the end of the work-day since  [employer] and phone messages may NOT be answered for a day or two.   for non-urgent issues, call the clinic  [phone], Option 3    Labor & Delivery  [phone] Starting at 20 weeks of the pregnancy,  you can speak to a nurse on L&D -.       For information regarding Tours of labor and delivery, Big brother / Big sister classes, Baby Love Classes (preparing for the arrival of your Lane), Lamaze (Labor breathing) classes and Breastfeeding information please call -> [phone]    PLEASE BE AWARE THAT THE BACK ENTRANCE BY LABOR AND DELIVERY (THE BLUE AWNING) IS ONLY OPEN UNTIL 6:00 P.M..    AFTER 6:00 P.M. PLEASE ENTER THROUGH THE EMERGENCY ROOM AT [facility].      SECOND TRIMESTER  14+0 - 28+6 weeks    Adapting to Pregnancy: Second Trimester   Keep up the healthy habits you started in your first trimester.  It's not too late to make better choices and drop bad habits - your baby's counting on you!  Most women enjoy this middle part of the pregnancy: first trimester fatigue and morning sickness are probably behind you, and your belly's not yet getting in the way physically.    Your To-Do List  There are several things you should start working on.  More information on these topics can be found in your OB Welcome Packet and the A-Z Book.    Choose a pediatrician for your baby.  Sign up for a tour and  classes at the hospital.  All classes are free of charge if you are delivering at [facility].  Baby Love (learn about the delivery process and caring for a )  Big Brother / Big Sister Class (to help siblings prepare for baby)  Lamaze (a 4 week class to learn about natural interventions for labor)  Breastfeeding (get a head start learning about breastfeeding)  Work on some methods for coping with the pains of labor.  A good bit of labor happens before you can get an epidural, and you'll feel more confident if you have a plan that you've practiced.  We encourage everyone to breastfeed if they can (and most women can!).  Please ask us questions if you have them.  Decide what you'd like to use for contraception (birth control) after this baby is born.  If you're having a boy, let us know if you plan to have him circumcised.    Pregnancy Milestones  After week 16, avoid lying on your back for more than a few minutes. Instead, lie on your side and switch sides often.  Between 19 and 22 weeks you'll have an ultrasound to look at the baby's anatomy and determine the gender (if you want to know and don't already know). This is around the same time when you should start feeling baby's movements and kicks.  Your gestational diabetes test will be done around 28 weeks.  We'll give you a TDAP booster shot so that your baby is protected from Whooping Cough (pertussis) his/her first few months of life.    When You Travel   The second trimester is a good time for travel. Talk to your health care provider about any special plans you may need to make. Always:   Wear a seat belt. Fasten the lap part under your belly. Wear the shoulder part also.   Take frequent breaks during long trips by car or plane. Move around to stretch your legs.   Drink plenty of fluids on flights. The air in plane cabins is very dry.   Avoid hot climates or high altitudes if you are not used to them.   Avoid places where the food and water might make you  sick.    Intimacy  Unless your health care provider tells you otherwise, it's perfectly fine to continue having sex. In fact, many women find sex in the second trimester quite enjoyable due to increased blood supply to the pelvic area.    Baby!  Organs continue to develop and begin to function, there's formation of eyebrows / eyelashes / fingernails, skin is wrinkled and covered in vernix, genitals develop, a fine hair called lanugo covers the body, and baby is busy: kicking, sleeping, swallowing amniotic fluid, listening to you, passing urine, and sucking those thumbs.
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
## 2024-06-30 LAB
CONFIRM DRVVT STA-STACLOT: NORMAL S
DRVVT SCREEN TO CONFIRM RATIO: NORMAL {RATIO}
HEPARIN NT PPP QL: NORMAL
LA 3 SCREEN W REFLEX-IMP: NORMAL
LMW HEPARIN IND PLT AB SER QL: NORMAL
MIXING DRVVT/NORMAL: NORMAL %
NEUTRALIZED DRVVT SCREEN RATIO: NORMAL
PROTHROMBIN TIME: 12.5 S (ref 12–15.5)
SCREEN APTT/NORMAL: 1.06
SCREEN APTT/NORMAL: NORMAL
SCREEN DRVVT/NORMAL: 1.06 %
THROMBIN TIME: NORMAL S

## 2024-07-01 LAB
B2 GLYCOPROT1 IGA SER QL: 1.5 U/ML
B2 GLYCOPROT1 IGG SER QL: 1.2 U/ML
B2 GLYCOPROT1 IGM SER QL: 3.4 U/ML
CARDIOLIPIN IGG SER IA-ACNC: <9.4 GPL (ref 0–14.99)
CARDIOLIPIN IGM SER IA-ACNC: <9.4 MPL (ref 0–12.49)

## 2024-07-03 ENCOUNTER — PATIENT MESSAGE (OUTPATIENT)
Dept: OTHER | Facility: OTHER | Age: 29
End: 2024-07-03
Payer: MEDICAID

## 2024-07-09 ENCOUNTER — TELEPHONE (OUTPATIENT)
Dept: OBSTETRICS AND GYNECOLOGY | Facility: CLINIC | Age: 29
End: 2024-07-09
Payer: MEDICAID

## 2024-07-09 ENCOUNTER — TELEPHONE (OUTPATIENT)
Dept: OBSTETRICS AND GYNECOLOGY | Facility: CLINIC | Age: 29
End: 2024-07-09

## 2024-07-09 ENCOUNTER — PATIENT MESSAGE (OUTPATIENT)
Dept: OBSTETRICS AND GYNECOLOGY | Facility: CLINIC | Age: 29
End: 2024-07-09
Payer: MEDICAID

## 2024-07-09 DIAGNOSIS — O09.292 PRIOR POOR OBSTETRICAL HISTORY IN SECOND TRIMESTER, ANTEPARTUM: Primary | ICD-10-CM

## 2024-07-09 NOTE — TELEPHONE ENCOUNTER
----- Message from Cameron Stringer MD sent at 7/9/2024  1:51 PM CDT -----  Regarding: Labs  Please contact this patient and arrange for to have present to any [facility] lab for further lab work.  I have ordered some labs recommended by Maternal-Fetal Medicine.

## 2024-07-09 NOTE — TELEPHONE ENCOUNTER
Attempted to contact pt twice with a message stating the call cannot be completed.  Sent pt a portal message in regard to scheduling labs.

## 2024-07-11 ENCOUNTER — HOSPITAL ENCOUNTER (OUTPATIENT)
Facility: HOSPITAL | Age: 29
Discharge: HOME | End: 2024-07-11
Attending: OBSTETRICS & GYNECOLOGY | Admitting: STUDENT IN AN ORGANIZED HEALTH CARE EDUCATION/TRAINING PROGRAM
Payer: MEDICAID

## 2024-07-11 VITALS — SYSTOLIC BLOOD PRESSURE: 122 MMHG | OXYGEN SATURATION: 100 % | HEART RATE: 78 BPM | DIASTOLIC BLOOD PRESSURE: 78 MMHG

## 2024-07-11 DIAGNOSIS — R51.9 HEADACHE: ICD-10-CM

## 2024-07-11 LAB
AMORPH CRY URNS QL MICRO: ABNORMAL
BACTERIA #/AREA URNS HPF: ABNORMAL /HPF
BILIRUB UR QL STRIP: NEGATIVE
CLARITY UR: ABNORMAL
COLOR UR: YELLOW
GLUCOSE UR QL STRIP: NEGATIVE
HGB UR QL STRIP: NEGATIVE
HYALINE CASTS #/AREA URNS LPF: 0 /LPF
KETONES UR QL STRIP: ABNORMAL
LEUKOCYTE ESTERASE UR QL STRIP: ABNORMAL
MICROSCOPIC COMMENT: ABNORMAL
NITRITE UR QL STRIP: NEGATIVE
PH UR STRIP: 7 [PH] (ref 5–8)
PROT UR QL STRIP: ABNORMAL
RBC #/AREA URNS HPF: 4 /HPF (ref 0–4)
SP GR UR STRIP: 1.02 (ref 1–1.03)
SQUAMOUS #/AREA URNS HPF: 8 /HPF
URN SPEC COLLECT METH UR: ABNORMAL
UROBILINOGEN UR STRIP-ACNC: NEGATIVE EU/DL
WBC #/AREA URNS HPF: 44 /HPF (ref 0–5)

## 2024-07-11 PROCEDURE — 87086 URINE CULTURE/COLONY COUNT: CPT | Performed by: OBSTETRICS & GYNECOLOGY

## 2024-07-11 PROCEDURE — 99211 OFF/OP EST MAY X REQ PHY/QHP: CPT

## 2024-07-11 PROCEDURE — 81001 URINALYSIS AUTO W/SCOPE: CPT | Performed by: OBSTETRICS & GYNECOLOGY

## 2024-07-11 PROCEDURE — 25000003 PHARM REV CODE 250: Performed by: STUDENT IN AN ORGANIZED HEALTH CARE EDUCATION/TRAINING PROGRAM

## 2024-07-11 RX ORDER — ACETAMINOPHEN 500 MG
500 TABLET ORAL ONCE
Status: COMPLETED | OUTPATIENT
Start: 2024-07-11 | End: 2024-07-11

## 2024-07-11 RX ADMIN — ACETAMINOPHEN 500 MG: 500 TABLET, FILM COATED ORAL at 08:07

## 2024-07-14 LAB — BACTERIA UR CULT: NO GROWTH

## 2024-07-15 ENCOUNTER — TELEPHONE (OUTPATIENT)
Dept: OBSTETRICS AND GYNECOLOGY | Facility: CLINIC | Age: 29
End: 2024-07-15
Payer: MEDICAID

## 2024-07-15 NOTE — TELEPHONE ENCOUNTER
"Attempted to contact pt twice by phone and received message, "You have reached a non-working number".  Sending message via portal.  "

## 2024-07-18 ENCOUNTER — LAB VISIT (OUTPATIENT)
Dept: LAB | Facility: HOSPITAL | Age: 29
End: 2024-07-18
Attending: OBSTETRICS & GYNECOLOGY
Payer: MEDICAID

## 2024-07-18 DIAGNOSIS — O09.292 PRIOR POOR OBSTETRICAL HISTORY IN SECOND TRIMESTER, ANTEPARTUM: ICD-10-CM

## 2024-07-18 LAB
ALBUMIN SERPL BCP-MCNC: 2.9 G/DL (ref 3.5–5.2)
ALP SERPL-CCNC: 68 U/L (ref 55–135)
ALT SERPL W/O P-5'-P-CCNC: 11 U/L (ref 10–44)
ANION GAP SERPL CALC-SCNC: 8 MMOL/L (ref 8–16)
AST SERPL-CCNC: 10 U/L (ref 10–40)
BASOPHILS # BLD AUTO: 0.04 K/UL (ref 0–0.2)
BASOPHILS NFR BLD: 0.5 % (ref 0–1.9)
BILIRUB SERPL-MCNC: 0.3 MG/DL (ref 0.1–1)
BUN SERPL-MCNC: 4 MG/DL (ref 6–20)
CALCIUM SERPL-MCNC: 9.4 MG/DL (ref 8.7–10.5)
CHLORIDE SERPL-SCNC: 107 MMOL/L (ref 95–110)
CO2 SERPL-SCNC: 22 MMOL/L (ref 23–29)
CREAT SERPL-MCNC: 0.7 MG/DL (ref 0.5–1.4)
CREAT UR-MCNC: 100 MG/DL (ref 15–325)
DIFFERENTIAL METHOD BLD: ABNORMAL
EOSINOPHIL # BLD AUTO: 0.1 K/UL (ref 0–0.5)
EOSINOPHIL NFR BLD: 0.7 % (ref 0–8)
ERYTHROCYTE [DISTWIDTH] IN BLOOD BY AUTOMATED COUNT: 14.9 % (ref 11.5–14.5)
EST. GFR  (NO RACE VARIABLE): >60 ML/MIN/1.73 M^2
GLUCOSE SERPL-MCNC: 104 MG/DL (ref 70–110)
HCT VFR BLD AUTO: 37.1 % (ref 37–48.5)
HGB BLD-MCNC: 11.8 G/DL (ref 12–16)
IMM GRANULOCYTES # BLD AUTO: 0.03 K/UL (ref 0–0.04)
IMM GRANULOCYTES NFR BLD AUTO: 0.4 % (ref 0–0.5)
LYMPHOCYTES # BLD AUTO: 1.4 K/UL (ref 1–4.8)
LYMPHOCYTES NFR BLD: 16.5 % (ref 18–48)
MCH RBC QN AUTO: 30.2 PG (ref 27–31)
MCHC RBC AUTO-ENTMCNC: 31.8 G/DL (ref 32–36)
MCV RBC AUTO: 95 FL (ref 82–98)
MONOCYTES # BLD AUTO: 0.4 K/UL (ref 0.3–1)
MONOCYTES NFR BLD: 4.9 % (ref 4–15)
NEUTROPHILS # BLD AUTO: 6.3 K/UL (ref 1.8–7.7)
NEUTROPHILS NFR BLD: 77 % (ref 38–73)
NRBC BLD-RTO: 0 /100 WBC
PLATELET # BLD AUTO: 183 K/UL (ref 150–450)
PMV BLD AUTO: 11.2 FL (ref 9.2–12.9)
POTASSIUM SERPL-SCNC: 4.3 MMOL/L (ref 3.5–5.1)
PROT SERPL-MCNC: 6 G/DL (ref 6–8.4)
PROT UR-MCNC: <7 MG/DL (ref 0–15)
PROT/CREAT UR: NORMAL MG/G{CREAT} (ref 0–0.2)
RBC # BLD AUTO: 3.91 M/UL (ref 4–5.4)
SODIUM SERPL-SCNC: 137 MMOL/L (ref 136–145)
WBC # BLD AUTO: 8.22 K/UL (ref 3.9–12.7)

## 2024-07-18 PROCEDURE — 82570 ASSAY OF URINE CREATININE: CPT | Performed by: OBSTETRICS & GYNECOLOGY

## 2024-07-18 PROCEDURE — 80053 COMPREHEN METABOLIC PANEL: CPT | Performed by: OBSTETRICS & GYNECOLOGY

## 2024-07-18 PROCEDURE — 85025 COMPLETE CBC W/AUTO DIFF WBC: CPT | Performed by: OBSTETRICS & GYNECOLOGY

## 2024-07-18 PROCEDURE — 36415 COLL VENOUS BLD VENIPUNCTURE: CPT | Mod: PO | Performed by: OBSTETRICS & GYNECOLOGY

## 2024-07-25 ENCOUNTER — PATIENT MESSAGE (OUTPATIENT)
Dept: OBSTETRICS AND GYNECOLOGY | Facility: CLINIC | Age: 29
End: 2024-07-25
Payer: MEDICAID

## 2024-07-25 ENCOUNTER — TELEPHONE (OUTPATIENT)
Dept: MATERNAL FETAL MEDICINE | Facility: CLINIC | Age: 29
End: 2024-07-25

## 2024-07-25 ENCOUNTER — PROCEDURE VISIT (OUTPATIENT)
Dept: MATERNAL FETAL MEDICINE | Facility: CLINIC | Age: 29
End: 2024-07-25
Payer: MEDICAID

## 2024-07-25 ENCOUNTER — OFFICE VISIT (OUTPATIENT)
Dept: MATERNAL FETAL MEDICINE | Facility: CLINIC | Age: 29
End: 2024-07-25
Payer: MEDICAID

## 2024-07-25 VITALS
HEART RATE: 84 BPM | DIASTOLIC BLOOD PRESSURE: 72 MMHG | SYSTOLIC BLOOD PRESSURE: 133 MMHG | HEIGHT: 65 IN | BODY MASS INDEX: 30.49 KG/M2 | WEIGHT: 183 LBS

## 2024-07-25 DIAGNOSIS — Z36.2 ENCOUNTER FOR FOLLOW-UP ULTRASOUND OF FETAL ANATOMY: ICD-10-CM

## 2024-07-25 DIAGNOSIS — O09.292 PRIOR POOR OBSTETRICAL HISTORY IN SECOND TRIMESTER, ANTEPARTUM: Primary | ICD-10-CM

## 2024-07-25 DIAGNOSIS — O09.292 HISTORY OF PRE-ECLAMPSIA IN PRIOR PREGNANCY, CURRENTLY PREGNANT IN SECOND TRIMESTER: ICD-10-CM

## 2024-07-25 DIAGNOSIS — Z36.89 ENCOUNTER FOR ULTRASOUND TO ASSESS FETAL GROWTH: Primary | ICD-10-CM

## 2024-07-25 PROCEDURE — 76816 OB US FOLLOW-UP PER FETUS: CPT | Mod: PBBFAC,PN | Performed by: STUDENT IN AN ORGANIZED HEALTH CARE EDUCATION/TRAINING PROGRAM

## 2024-07-25 PROCEDURE — 99213 OFFICE O/P EST LOW 20 MIN: CPT | Mod: PBBFAC,TH,PN | Performed by: STUDENT IN AN ORGANIZED HEALTH CARE EDUCATION/TRAINING PROGRAM

## 2024-07-25 PROCEDURE — 99999 PR PBB SHADOW E&M-EST. PATIENT-LVL III: CPT | Mod: PBBFAC,,, | Performed by: STUDENT IN AN ORGANIZED HEALTH CARE EDUCATION/TRAINING PROGRAM

## 2024-07-25 NOTE — ASSESSMENT & PLAN NOTE
Prior stillbirth/IUFD:  Previously counseled- see initial note      Summary of Recommendations:  Continue low dose aspirin 81 mg for preeclampsia risk reduction  Close surveillance for signs/symptoms of preeclampsia in second/third trimester and postpartum period  Serial fetal growth ultrasounds every 4-6 weeks, starting at 26-28 weeks- scheduled  Fetal movement awareness, starting at 27-28 weeks    
See initial note  
127

## 2024-07-25 NOTE — PROGRESS NOTES
"Maternal Fetal Medicine Follow up  SUBJECTIVE:     Vira Ybarra is a 28 y.o.  female with IUP at 23w1d who is seen for [facility] follow up for management of:    Problem   Prior Poor Obstetrical History in Second Trimester, Antepartum   History of Pre-Eclampsia in Prior Pregnancy, Currently Pregnant in Second Trimester     Previous notes reviewed.   No changes to medical, surgical, family, social, or obstetric history.    Review of patient's allergies indicates:   Allergen Reactions    Latex, natural rubber Anaphylaxis    Iodine and iodide containing products Rash     Care team members:  MD Siomara - Primary OB  OBJECTIVE:   Blood Pressure: /72 (BP Location: Left arm, Patient Position: Sitting)   Pulse 84   Ht 5' 5" (1.651 m)   Wt 83 kg (182 lb 15.7 oz)   LMP 2024   BMI 30.45 kg/m²   Ultrasound performed. See viewpoint for full ultrasound report.  No fetal structural malformations are identified today; however, the fetal anatomic survey remains incomplete, as noted in the 'fetal anatomy' section of this report.  If further ultrasound exams are planned for other indications, will attempt completion of the anatomic survey at that time. No other routine f/u exams to attempt completion of the anatomic survey will be scheduled by [facility].  Fetal size is AGA, and the AFV is normal.   ASSESSMENT/PLAN:     28 y.o.  female with IUP at 23w1d presents for [facility] follow up.    Prior poor obstetrical history in second trimester, antepartum  Prior stillbirth/IUFD:  Previously counseled- see initial note      Summary of Recommendations:  Continue low dose aspirin 81 mg for preeclampsia risk reduction  Close surveillance for signs/symptoms of preeclampsia in second/third trimester and postpartum period  Serial fetal growth ultrasounds every 4-6 weeks, starting at 26-28 weeks- scheduled  Fetal movement awareness, starting at 27-28 weeks      History of pre-eclampsia in prior pregnancy, currently pregnant in " second trimester  See initial note      FOLLOW UP:   F/u US at 28 weeks, 32 week MD visit and US    Marvin Castillo  Maternal-Fetal Medicine    Electronically Signed by Marvin Castillo July 25, 2024

## 2024-07-26 ENCOUNTER — PATIENT MESSAGE (OUTPATIENT)
Dept: MATERNAL FETAL MEDICINE | Facility: CLINIC | Age: 29
End: 2024-07-26
Payer: MEDICAID

## 2024-07-26 ENCOUNTER — PATIENT MESSAGE (OUTPATIENT)
Dept: OBSTETRICS AND GYNECOLOGY | Facility: CLINIC | Age: 29
End: 2024-07-26
Payer: MEDICAID

## 2024-07-26 ENCOUNTER — TELEPHONE (OUTPATIENT)
Dept: OBSTETRICS AND GYNECOLOGY | Facility: CLINIC | Age: 29
End: 2024-07-26
Payer: MEDICAID

## 2024-07-26 DIAGNOSIS — O21.9 NAUSEA AND VOMITING IN PREGNANCY: Primary | ICD-10-CM

## 2024-07-26 RX ORDER — ONDANSETRON 4 MG/1
4 TABLET, ORALLY DISINTEGRATING ORAL EVERY 6 HOURS PRN
Qty: 20 TABLET | Refills: 0 | Status: SHIPPED | OUTPATIENT
Start: 2024-07-26

## 2024-07-31 ENCOUNTER — PATIENT MESSAGE (OUTPATIENT)
Dept: OTHER | Facility: OTHER | Age: 29
End: 2024-07-31
Payer: MEDICAID

## 2024-08-12 ENCOUNTER — TELEPHONE (OUTPATIENT)
Dept: OBSTETRICS AND GYNECOLOGY | Facility: CLINIC | Age: 29
End: 2024-08-12
Payer: MEDICAID

## 2024-08-13 ENCOUNTER — HOSPITAL ENCOUNTER (OUTPATIENT)
Facility: HOSPITAL | Age: 29
Discharge: HOME | End: 2024-08-13
Attending: OBSTETRICS & GYNECOLOGY | Admitting: OBSTETRICS & GYNECOLOGY
Payer: MEDICAID

## 2024-08-13 VITALS — DIASTOLIC BLOOD PRESSURE: 71 MMHG | SYSTOLIC BLOOD PRESSURE: 118 MMHG | HEART RATE: 87 BPM | OXYGEN SATURATION: 98 %

## 2024-08-13 DIAGNOSIS — O99.891 BACK PAIN AFFECTING PREGNANCY: ICD-10-CM

## 2024-08-13 DIAGNOSIS — M54.9 BACK PAIN AFFECTING PREGNANCY: ICD-10-CM

## 2024-08-13 LAB
ALBUMIN SERPL BCP-MCNC: 3.8 G/DL (ref 3.5–5.2)
ALP SERPL-CCNC: 75 U/L (ref 55–135)
ALT SERPL W/O P-5'-P-CCNC: 12 U/L (ref 10–44)
ANION GAP SERPL CALC-SCNC: 11 MMOL/L (ref 8–16)
AST SERPL-CCNC: 12 U/L (ref 10–40)
BACTERIA #/AREA URNS HPF: NORMAL /HPF
BASOPHILS # BLD AUTO: 0.03 K/UL (ref 0–0.2)
BASOPHILS NFR BLD: 0.3 % (ref 0–1.9)
BILIRUB SERPL-MCNC: 0.3 MG/DL (ref 0.1–1)
BILIRUB UR QL STRIP: NEGATIVE
BUN SERPL-MCNC: 7 MG/DL (ref 6–20)
CALCIUM SERPL-MCNC: 9.5 MG/DL (ref 8.7–10.5)
CHLORIDE SERPL-SCNC: 109 MMOL/L (ref 95–110)
CLARITY UR: ABNORMAL
CO2 SERPL-SCNC: 22 MMOL/L (ref 23–29)
COLOR UR: YELLOW
CREAT SERPL-MCNC: 0.5 MG/DL (ref 0.5–1.4)
CREAT UR-MCNC: 106.3 MG/DL (ref 15–325)
DIFFERENTIAL METHOD BLD: ABNORMAL
EOSINOPHIL # BLD AUTO: 0.1 K/UL (ref 0–0.5)
EOSINOPHIL NFR BLD: 0.8 % (ref 0–8)
ERYTHROCYTE [DISTWIDTH] IN BLOOD BY AUTOMATED COUNT: 14.5 % (ref 11.5–14.5)
EST. GFR  (NO RACE VARIABLE): >60 ML/MIN/1.73 M^2
GLUCOSE SERPL-MCNC: 84 MG/DL (ref 70–110)
GLUCOSE UR QL STRIP: ABNORMAL
HCT VFR BLD AUTO: 38.7 % (ref 37–48.5)
HGB BLD-MCNC: 12.7 G/DL (ref 12–16)
HGB UR QL STRIP: NEGATIVE
HYALINE CASTS #/AREA URNS LPF: 1 /LPF
IMM GRANULOCYTES # BLD AUTO: 0.04 K/UL (ref 0–0.04)
IMM GRANULOCYTES NFR BLD AUTO: 0.4 % (ref 0–0.5)
KETONES UR QL STRIP: NEGATIVE
LEUKOCYTE ESTERASE UR QL STRIP: ABNORMAL
LYMPHOCYTES # BLD AUTO: 1.7 K/UL (ref 1–4.8)
LYMPHOCYTES NFR BLD: 15.4 % (ref 18–48)
MCH RBC QN AUTO: 30.5 PG (ref 27–31)
MCHC RBC AUTO-ENTMCNC: 32.8 G/DL (ref 32–36)
MCV RBC AUTO: 93 FL (ref 82–98)
MICROSCOPIC COMMENT: NORMAL
MONOCYTES # BLD AUTO: 0.5 K/UL (ref 0.3–1)
MONOCYTES NFR BLD: 4.6 % (ref 4–15)
NEUTROPHILS # BLD AUTO: 8.6 K/UL (ref 1.8–7.7)
NEUTROPHILS NFR BLD: 78.5 % (ref 38–73)
NITRITE UR QL STRIP: NEGATIVE
NRBC BLD-RTO: 0 /100 WBC
PH UR STRIP: 7 [PH] (ref 5–8)
PLATELET # BLD AUTO: 184 K/UL (ref 150–450)
PMV BLD AUTO: 9.9 FL (ref 9.2–12.9)
POTASSIUM SERPL-SCNC: 4.4 MMOL/L (ref 3.5–5.1)
PROT SERPL-MCNC: 6.5 G/DL (ref 6–8.4)
PROT UR QL STRIP: ABNORMAL
PROT UR-MCNC: 13 MG/DL (ref 6–15)
PROT/CREAT UR: 0.12 MG/G{CREAT} (ref 0–0.2)
RBC # BLD AUTO: 4.17 M/UL (ref 4–5.4)
RBC #/AREA URNS HPF: 2 /HPF (ref 0–4)
SODIUM SERPL-SCNC: 142 MMOL/L (ref 136–145)
SP GR UR STRIP: 1.02 (ref 1–1.03)
SQUAMOUS #/AREA URNS HPF: 1 /HPF
UNIDENT CRYS URNS QL MICRO: 7
URN SPEC COLLECT METH UR: ABNORMAL
UROBILINOGEN UR STRIP-ACNC: ABNORMAL EU/DL
WBC # BLD AUTO: 10.89 K/UL (ref 3.9–12.7)
WBC #/AREA URNS HPF: 5 /HPF (ref 0–5)

## 2024-08-13 PROCEDURE — 85025 COMPLETE CBC W/AUTO DIFF WBC: CPT | Performed by: OBSTETRICS & GYNECOLOGY

## 2024-08-13 PROCEDURE — 59025 FETAL NON-STRESS TEST: CPT | Mod: 26,,, | Performed by: GENERAL PRACTICE

## 2024-08-13 PROCEDURE — 80053 COMPREHEN METABOLIC PANEL: CPT | Performed by: OBSTETRICS & GYNECOLOGY

## 2024-08-13 PROCEDURE — 99215 OFFICE O/P EST HI 40 MIN: CPT | Mod: 25,TH,, | Performed by: GENERAL PRACTICE

## 2024-08-13 PROCEDURE — 84156 ASSAY OF PROTEIN URINE: CPT | Performed by: OBSTETRICS & GYNECOLOGY

## 2024-08-13 PROCEDURE — 81001 URINALYSIS AUTO W/SCOPE: CPT | Performed by: OBSTETRICS & GYNECOLOGY

## 2024-08-13 PROCEDURE — 99211 OFF/OP EST MAY X REQ PHY/QHP: CPT

## 2024-08-13 PROCEDURE — 36415 COLL VENOUS BLD VENIPUNCTURE: CPT | Performed by: OBSTETRICS & GYNECOLOGY
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
## 2024-08-14 ENCOUNTER — PATIENT MESSAGE (OUTPATIENT)
Dept: OTHER | Facility: OTHER | Age: 29
End: 2024-08-14
Payer: MEDICAID

## 2024-08-14 NOTE — DISCHARGE INSTRUCTIONS
Keep your scheduled appointment with your provider.    Call your Doctor if you have any of the following:  Temperature above 100 degrees  Nausea, vomiting and/or diarrhea  Severe headache, dizziness, or blurred vision  Notable increase in swelling of hands or feet  Notable swelling of face and lips  Difficulty, pain or burning with urination  Foul smelling vaginal discharge  Decreased fetal movement    Come to the hospital if you have any of the following symptoms:  Your water breaks  More than 4-6 contractions in 1 hour for 2 or more hours  Vaginal bleeding like a period    After 28 weeks, you should feel 10 distinct fetal movements within a 2 hour period.    It is recommended that you drink 1/2 a gallon of water each day.  Tea, Soda and Juice are  in addition to this.    Labor and Delivery Phone number: [phone]    If you need to be seen on Labor and delivery between the hours of 6pm and 7am, please enter through the Emergency room entrance.

## 2024-08-14 NOTE — NURSING
1845: Report received from Anya SLOAN RN. Awaiting labs.     1855: Lab results reviewed by Dr. Geiger, d/c orders received.    1905: Dc education given, pt verbalizes understand and denies further questions at this time. Will follow up with Dr. Stringer at scheduled appointment this Friday.    1906: Pt ambulated off unit, RICH

## 2024-08-14 NOTE — NURSING
"[facility]  Department of Obstetrics and Gynecology  Labor & Delivery Triage Assessment    PATIENT NAME: Vira Ybarra  MRN: [id number]  TODAY'S DATE: 2024    CHIEF COMPLAINT: Back Pain ("Back pain and chest pain, like I had with my last pregnancy and I had pre eclampsia and delivered at 26 weeks as well")      OB History    Para Term  AB Living   5 3 2 1 1 2   SAB IAB Ectopic Multiple Live Births   1 0 0 0 2      # Outcome Date GA Lbr Enrique/2nd Weight Sex Type Anes PTL Lv   5 Current            4 SAB 10/2023           3  19 26w0d  0.539 kg (1 lb 3 oz) F CS-Unspec   GILBERT      Complications: Pre-eclampsia   2 Term 17   5.925 kg (13 lb 1 oz) F CS-Unspec   GILBERT   1 Term  41w0d   M Vag-Spont   FD     Past Medical History:   Diagnosis Date    Abnormal Pap smear of cervix     Anemia      Past Surgical History:   Procedure Laterality Date     SECTION      x2         VITAL SIGNS - ABNORMAL VITALS INCLUDE TEMP >100.4,RR <12 or >26, SUSTAINED MATERNAL PULSE <60 or >120     VITAL SIGNS (Most Recent)  Pulse: 87 (24 1708)  BP: 118/71 (24 1700)  SpO2: 98 % (24 1708)    VITAL SIGNS     normal  HEADACHE    no     VOMITING    no  VISUAL DISTURBANCES  no  EPIGASTRIC PAIN        yes  PROTEINURIA 2+ or MORE                EDEMA FACE/EXTREMITIES            no    FETAL MOVEMENT     FETAL MOVEMENT: Active  FETAL HEART RATE BASELINE =  145    normal  FETAL HEART RATE VARIABILITY:  Moderate  FETAL HEART RATE ACCELERATIONS FOR GESTATIONAL AGE: present  FETAL HEART RATE DECELERATIONS:     ABDOMINAL PAIN/CRAMPING/CONTRACTIONS     Patient is not complaining of abdominal pain/cramping/contractions.    RUPTURE OF MEMBRANES OR LEAKING OF AMNIOTIC FLUID     Patient denies ROM or leaking of amniotic fluid.    VAGINAL BLEEDING     Patient denies vaginal bleeding.    VAGINAL EXAM     DILATION:    STATION:    EFFACEMENT:    PRESENTATION:      VAGINAL EXAM DEFERRED " DUE TO:  Not in Labor    PAIN PRESENT ON ARRIVAL     ONSET:   1500    LOCATION:  Lower back and upper chest  PAIN SCALE (0-10):  5    DESCRIPTION: Associates as feeling like it was with previous pregnancy when she had pre eclampsia and delivered @ 26 weeks gestation.    Interventions           PATIENT DISPOSITION     Discharged Home      Dr. Geiger  notified at 1820 of the above assessment.    Anya Millan RN  [facility]  08/13/2024

## 2024-08-18 ENCOUNTER — ON-DEMAND VIRTUAL (OUTPATIENT)
Dept: URGENT CARE | Facility: CLINIC | Age: 29
End: 2024-08-18
Payer: MEDICAID

## 2024-08-18 DIAGNOSIS — J02.9 PHARYNGITIS, UNSPECIFIED ETIOLOGY: Primary | ICD-10-CM

## 2024-08-18 PROCEDURE — 99212 OFFICE O/P EST SF 10 MIN: CPT | Mod: 95,,, | Performed by: FAMILY MEDICINE

## 2024-08-18 NOTE — PATIENT INSTRUCTIONS
AS WE DISCUSSED, MY RECOMMENDATION IS THAT YOU BE SEEN IN PERSON TODAY AT URGENT CARE FOR STREP TESTING, AND OTHER TESTING IF INDICATED

## 2024-08-18 NOTE — PROGRESS NOTES
Subjective:      Patient ID: Vira Ybarra is a 28 y.o. female.    Vitals:  vitals were not taken for this visit.     Chief Complaint: Sore Throat      Visit Type: TELE AUDIOVISUAL    Present with the patient at the time of consultation: TELEMED PRESENT WITH PATIENT: spouse, at home    Past Medical History:   Diagnosis Date    Abnormal Pap smear of cervix     Anemia      Past Surgical History:   Procedure Laterality Date     SECTION      x2     Review of patient's allergies indicates:   Allergen Reactions    Latex, natural rubber Anaphylaxis    Iodine and iodide containing products Rash     Current Outpatient Medications on File Prior to Visit   Medication Sig Dispense Refill    aspirin (ECOTRIN) 81 MG EC tablet Take 1 tablet (81 mg total) by mouth once daily. 90 tablet 3    busPIRone (BUSPAR) 5 MG Tab Take 1 tablet (5 mg total) by mouth 2 (two) times daily as needed (Anxiety). As needed for anxiety 60 tablet 11    doxylamine-pyridoxine, vit B6, (DICLEGIS) 10-10 mg TbEC Take 2 tablets by mouth every evening. (Patient not taking: Reported on 2024) 60 tablet 11    EScitalopram oxalate (LEXAPRO) 20 MG tablet Take 20 mg by mouth 2 (two) times daily.      ondansetron (ZOFRAN-ODT) 4 MG TbDL Take 1 tablet (4 mg total) by mouth every 6 (six) hours as needed (Nausea). 20 tablet 0    prenatal vit no.130-iron-folic (PRENATAL VITAMIN) 27 mg iron- 800 mcg Tab Take 1 tablet by mouth once daily. 30 tablet 11     No current facility-administered medications on file prior to visit.     Family History   Problem Relation Name Age of Onset    Breast cancer Mother      Cervical cancer Mother      Ovarian cancer Mother             Ohs Peq Odvv Intake    2024  5:36 AM CDT - Filed by Patient   What is your current physical address in the event of a medical emergency? [id number]   Are you able to take your vital signs? No   Please attach any relevant images or files          28-year-old female, was is  also 26 weeks pregnant, and thinks she may have strep.  She has a daughter recently who was diagnosed with strep and flu.  Complains of sore throat and congestion.  No fever or cough.  She has also been vomiting, this has been an issue throughout her pregnancy.        Constitution: Negative for fever.        Objective:   The physical exam was conducted virtually.  Physical Exam   Constitutional: She is oriented to person, place, and time. She appears well-developed.  Non-toxic appearance. No distress.      Comments:Congestion noted.  Appears fatigued     HENT:   Head: Normocephalic and atraumatic.   Ears:   Right Ear: External ear normal.   Left Ear: External ear normal.   Pulmonary/Chest: Effort normal. No stridor. No respiratory distress.   Neurological: She is alert and oriented to person, place, and time.   Skin: Skin is not diaphoretic.   Psychiatric: Her behavior is normal. Thought content normal.   Nursing note and vitals reviewed.      Assessment:     1. Pharyngitis, unspecified etiology        Plan:       Pharyngitis, unspecified etiology      AS WE DISCUSSED, MY RECOMMENDATION IS THAT YOU BE SEEN IN PERSON TODAY AT URGENT CARE FOR STREP TESTING, AND OTHER TESTING IF INDICATED

## 2024-08-19 ENCOUNTER — PATIENT MESSAGE (OUTPATIENT)
Dept: MATERNAL FETAL MEDICINE | Facility: CLINIC | Age: 29
End: 2024-08-19
Payer: MEDICAID

## 2024-08-21 ENCOUNTER — ROUTINE PRENATAL (OUTPATIENT)
Dept: OBSTETRICS AND GYNECOLOGY | Facility: CLINIC | Age: 29
End: 2024-08-21
Payer: MEDICAID

## 2024-08-21 VITALS
BODY MASS INDEX: 31.81 KG/M2 | WEIGHT: 191.13 LBS | DIASTOLIC BLOOD PRESSURE: 72 MMHG | HEART RATE: 97 BPM | SYSTOLIC BLOOD PRESSURE: 134 MMHG

## 2024-08-21 DIAGNOSIS — O09.292 PRIOR POOR OBSTETRICAL HISTORY IN SECOND TRIMESTER, ANTEPARTUM: ICD-10-CM

## 2024-08-21 DIAGNOSIS — Z3A.27 27 WEEKS GESTATION OF PREGNANCY: Primary | ICD-10-CM

## 2024-08-21 DIAGNOSIS — Z98.891 HISTORY OF 2 CESAREAN SECTIONS: ICD-10-CM

## 2024-08-21 PROCEDURE — 99213 OFFICE O/P EST LOW 20 MIN: CPT | Mod: PBBFAC,TH,PO | Performed by: OBSTETRICS & GYNECOLOGY

## 2024-08-21 PROCEDURE — 99999 PR PBB SHADOW E&M-EST. PATIENT-LVL III: CPT | Mod: PBBFAC,,, | Performed by: OBSTETRICS & GYNECOLOGY

## 2024-08-21 PROCEDURE — 90471 IMMUNIZATION ADMIN: CPT | Mod: PBBFAC,PO

## 2024-08-21 PROCEDURE — 90715 TDAP VACCINE 7 YRS/> IM: CPT | Mod: PBBFAC,PO

## 2024-08-21 PROCEDURE — 99999PBSHW PR PBB SHADOW TECHNICAL ONLY FILED TO HB: Mod: PBBFAC,,,

## 2024-08-21 RX ORDER — SODIUM CITRATE AND CITRIC ACID MONOHYDRATE 334; 500 MG/5ML; MG/5ML
30 SOLUTION ORAL
Status: SHIPPED | OUTPATIENT
Start: 2024-08-21

## 2024-08-21 RX ORDER — OXYTOCIN-SODIUM CHLORIDE 0.9% IV SOLN 30 UNIT/500ML 30-0.9/5 UT/ML-%
95 SOLUTION INTRAVENOUS ONCE
Status: SHIPPED | OUTPATIENT
Start: 2024-08-21

## 2024-08-21 RX ORDER — FAMOTIDINE 10 MG/ML
20 INJECTION INTRAVENOUS
Status: SHIPPED | OUTPATIENT
Start: 2024-08-21

## 2024-08-21 RX ORDER — CEFAZOLIN SODIUM 2 G/50ML
2 SOLUTION INTRAVENOUS
Status: SHIPPED | OUTPATIENT
Start: 2024-08-21

## 2024-08-21 RX ORDER — MISOPROSTOL 200 UG/1
800 TABLET ORAL
Status: SHIPPED | OUTPATIENT
Start: 2024-08-21

## 2024-08-21 RX ORDER — MUPIROCIN 20 MG/G
OINTMENT TOPICAL
OUTPATIENT
Start: 2024-08-21

## 2024-08-21 RX ORDER — CARBOPROST TROMETHAMINE 250 UG/ML
250 INJECTION, SOLUTION INTRAMUSCULAR
Status: SHIPPED | OUTPATIENT
Start: 2024-08-21

## 2024-08-21 RX ORDER — METHYLERGONOVINE MALEATE 0.2 MG/ML
200 INJECTION INTRAVENOUS
Status: SHIPPED | OUTPATIENT
Start: 2024-08-21

## 2024-08-21 RX ORDER — SODIUM CHLORIDE, SODIUM LACTATE, POTASSIUM CHLORIDE, CALCIUM CHLORIDE 600; 310; 30; 20 MG/100ML; MG/100ML; MG/100ML; MG/100ML
INJECTION, SOLUTION INTRAVENOUS CONTINUOUS
Status: SHIPPED | OUTPATIENT
Start: 2024-08-21

## 2024-08-21 RX ORDER — OXYTOCIN-SODIUM CHLORIDE 0.9% IV SOLN 30 UNIT/500ML 30-0.9/5 UT/ML-%
10 SOLUTION INTRAVENOUS ONCE
Status: SHIPPED | OUTPATIENT
Start: 2024-08-21

## 2024-08-21 RX ADMIN — TETANUS TOXOID, REDUCED DIPHTHERIA TOXOID AND ACELLULAR PERTUSSIS VACCINE, ADSORBED 0.5 ML: 5; 2.5; 8; 8; 2.5 SUSPENSION INTRAMUSCULAR at 10:08

## 2024-08-21 NOTE — PATIENT INSTRUCTIONS
To schedule classes (see below for what's offered) at the hospital, call [phone].    Have a question or concern?    PRO TIP: Program these phone numbers in your cell phone!    for an emergency  call 911 or go to the nearest hospital Especially after 20 weeks of the pregnancy, please remember that  Labor & Delivery is at [facility].  There is no L&D at [facility] (formerly called [facility]).  After hours you can only access L&D through the Emergency Room (entrance on [facility]).   [facility] Nurse Care Advice Line  [phone] At any time during your pregnancy,  you can speak to a nurse 24-7.   for non-urgent issues, send us a  message in [employer] Consider calling the Nurse Care Advice Line if it's a weekend or  toward the end of the work-day since  [employer] and phone messages may not be answered for a day or two.   for non-urgent issues, call the clinic  [phone], Option 3    Labor & Delivery  [phone] Starting at 20 weeks of the pregnancy,  you can speak to a nurse on L&D 24-7.     SECOND TRIMESTER  14+0 - 28+6 weeks    Adapting to Pregnancy: Second Trimester   Keep up the healthy habits you started in your first trimester.  It's not too late to make better choices and drop bad habits - your baby's counting on you!  Most women enjoy this middle part of the pregnancy: first trimester fatigue and morning sickness are probably behind you, and your belly's not yet getting in the way physically.    Your To-Do List  There are several things you should start working on.  More information on these topics can be found in your OB Welcome Packet and the A-Z Book.    Choose a pediatrician for your baby.  Sign up for a tour and classes at the hospital.  All classes are free of charge if you are delivering at [facility].  Call [phone] to register for any of these classes:  Baby Love (learn about the delivery process and caring for a )  Big Brother / Big Sister Class (to help siblings prepare for  baby)  Lamaze (a 4 week class to learn about natural interventions for labor)  Breastfeeding (get a head start learning about breastfeeding)  Work on some methods for coping with the pains of labor.  A good bit of labor happens before you can get an epidural, and you'll feel more confident if you have a plan that you've practiced.  We encourage everyone to breastfeed if they can (and most women can!).  Please ask us questions if you have them.  Decide what you'd like to use for contraception (birth control) after this baby is born.  If you're having a boy, let us know if you plan to have him circumcised.    Pregnancy Milestones  After week 16, avoid lying on your back for more than a few minutes. Instead, lie on your side and switch sides often.  Between 19 and 22 weeks you'll have an ultrasound to look at the baby's anatomy and determine the gender (if you want to know and don't already know). This is around the same time when you should start feeling baby's movements and kicks.  Your gestational diabetes test will be done around 28 weeks.  We'll give you a TDAP booster shot so that your baby is protected from Whooping Cough (pertussis) his/her first few months of life.    When You Travel   The second trimester is a good time for travel. Talk to your health care provider about any special plans you may need to make. Always:   Wear a seat belt. Fasten the lap part under your belly. Wear the shoulder part also.   Take frequent breaks during long trips by car or plane. Move around to stretch your legs.   Drink plenty of fluids on flights. The air in plane cabins is very dry.   Avoid hot climates or high altitudes if you are not used to them.   Avoid places where the food and water might make you sick.    Intimacy  Unless your health care provider tells you otherwise, it's perfectly fine to continue having sex. In fact, many women find sex in the second trimester quite enjoyable due to increased blood supply to the  pelvic area.    Baby!  Organs continue to develop and begin to function, there's formation of eyebrows / eyelashes / fingernails, skin is wrinkled and covered in vernix, genitals develop, a fine hair called lanugo covers the body, and baby is busy: kicking, sleeping, swallowing amniotic fluid, listening to you, passing urine, and sucking those thumbs.    OTHER INFORMATION:  If your provider orders labs or other studies, you probably won't hear from us unless something is abnormal.  How we define normal is different for many things in pregnancy.  This includes several of the numbers on a Complete Blood Count (CBC).  If your result is flagged as abnormal in [place] but you don't hear from us, it's probably because things are actually normal based on where you are in your pregnancy.
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
## 2024-08-27 ENCOUNTER — HOSPITAL ENCOUNTER (OUTPATIENT)
Facility: HOSPITAL | Age: 29
Discharge: HOME | End: 2024-08-27
Attending: OBSTETRICS & GYNECOLOGY | Admitting: OBSTETRICS & GYNECOLOGY
Payer: MEDICAID

## 2024-08-27 VITALS
SYSTOLIC BLOOD PRESSURE: 129 MMHG | HEIGHT: 65 IN | DIASTOLIC BLOOD PRESSURE: 86 MMHG | BODY MASS INDEX: 31.82 KG/M2 | RESPIRATION RATE: 17 BRPM | WEIGHT: 191 LBS | HEART RATE: 95 BPM

## 2024-08-27 DIAGNOSIS — O42.90 AMNIOTIC FLUID LEAKING: ICD-10-CM

## 2024-08-27 LAB
CTP QC/QA: YES
POC PH, VAGINAL: NEGATIVE (ref 4.5–5.5)
RUPTURE OF MEMBRANE: NEGATIVE

## 2024-08-27 PROCEDURE — 99211 OFF/OP EST MAY X REQ PHY/QHP: CPT

## 2024-08-27 NOTE — NURSING
[facility]  Department of Obstetrics and Gynecology  Labor & Delivery Triage Assessment    PATIENT NAME: Vira Ybarra  MRN: [id number]  TODAY'S DATE: 2024    CHIEF COMPLAINT: Vaginal Discharge      OB History    Para Term  AB Living   5 3 2 1 1 2   SAB IAB Ectopic Multiple Live Births   1 0 0 0 2      # Outcome Date GA Lbr Enrique/2nd Weight Sex Type Anes PTL Lv   5 Current            4 SAB 10/2023           3  19 26w0d  0.539 kg (1 lb 3 oz) F CS-Unspec   GILBERT      Complications: Pre-eclampsia   2 Term 17   5.925 kg (13 lb 1 oz) F CS-Unspec   GILBERT   1 Term  41w0d   M Vag-Spont   FD     Past Medical History:   Diagnosis Date    Abnormal Pap smear of cervix     Anemia     Anxiety disorder, unspecified     Depression      Past Surgical History:   Procedure Laterality Date     SECTION      x2         VITAL SIGNS - ABNORMAL VITALS INCLUDE TEMP >100.4,RR <12 or >26, SUSTAINED MATERNAL PULSE <60 or >120     VITAL SIGNS (Most Recent)       VITAL SIGNS     normal  HEADACHE    no     VOMITING    no  VISUAL DISTURBANCES  no  EPIGASTRIC PAIN        no  PROTEINURIA 2+ or MORE             na   EDEMA FACE/EXTREMITIES            no    FETAL MOVEMENT     FETAL MOVEMENT: Active  FETAL HEART RATE BASELINE =  145  normal  FETAL HEART RATE VARIABILITY:  Moderate  FETAL HEART RATE ACCELERATIONS FOR GESTATIONAL AGE: present  FETAL HEART RATE DECELERATIONS: none    ABDOMINAL PAIN/CRAMPING/CONTRACTIONS     Patient is not complaining of abdominal pain/cramping/contractions.    RUPTURE OF MEMBRANES OR LEAKING OF AMNIOTIC FLUID     Patient reports leaking of amniotic fluid and is clear in color and reporting amount as small x1 episode just prior to coming in to LD. Nitrating is Negative. ROM plus collected is Negative. GBS status is Unknown.    VAGINAL BLEEDING     Patient denies vaginal bleeding.    VAGINAL EXAM     DILATION:  na  STATION:  na  EFFACEMENT:  na  PRESENTATION:   a    VAGINAL EXAM DEFERRED DUE TO:  Not in Labor    PAIN PRESENT ON ARRIVAL     ONSET:   na  LOCATION:  na  PAIN SCALE (0-10):  0  DESCRIPTION: na    Interventions     None.      PATIENT DISPOSITION     Discharged to home.    Dr. Montiel notified of above assessment at 1835.      Riana Cardozo, RN  [facility]  08/27/2024

## 2024-08-27 NOTE — DISCHARGE INSTRUCTIONS
Keep your scheduled appointment with your provider.    Call your Doctor if you have any of the following:  Temperature above 100 degrees  Nausea, vomiting and/or diarrhea  Severe headache, dizziness, or blurred vision  Notable increase in swelling of hands or feet  Notable swelling of face and lips  Difficulty, pain or burning with urination  Foul smelling vaginal discharge  Decreased fetal movement    Come to the hospital if you have any of the following symptoms:  Your water breaks  More than 4-6 contractions in 1 hour for 2 or more hours  Vaginal bleeding like a period    After 28 weeks, you should feel 10 distinct fetal movements within a 2 hour period.    It is recommended that you drink 1/2 a gallon of water each day.  Tea, Soda and Juice are  in addition to this.    Labor and Delivery Phone number: [phone]    If you need to be seen on Labor and delivery between the hours of 6pm and 7am, please enter through the Emergency room entrance.

## 2024-08-28 ENCOUNTER — PATIENT MESSAGE (OUTPATIENT)
Dept: OTHER | Facility: OTHER | Age: 29
End: 2024-08-28
Payer: MEDICAID

## 2024-08-29 ENCOUNTER — LAB VISIT (OUTPATIENT)
Dept: LAB | Facility: HOSPITAL | Age: 29
End: 2024-08-29
Attending: OBSTETRICS & GYNECOLOGY
Payer: MEDICAID

## 2024-08-29 DIAGNOSIS — Z3A.27 27 WEEKS GESTATION OF PREGNANCY: ICD-10-CM

## 2024-08-29 LAB
ALBUMIN SERPL BCP-MCNC: 2.8 G/DL (ref 3.5–5.2)
ALP SERPL-CCNC: 86 U/L (ref 55–135)
ALT SERPL W/O P-5'-P-CCNC: 19 U/L (ref 10–44)
ANION GAP SERPL CALC-SCNC: 8 MMOL/L (ref 8–16)
AST SERPL-CCNC: 13 U/L (ref 10–40)
BASOPHILS # BLD AUTO: 0.03 K/UL (ref 0–0.2)
BASOPHILS NFR BLD: 0.3 % (ref 0–1.9)
BILIRUB SERPL-MCNC: 0.3 MG/DL (ref 0.1–1)
BUN SERPL-MCNC: 5 MG/DL (ref 6–20)
CALCIUM SERPL-MCNC: 9.1 MG/DL (ref 8.7–10.5)
CHLORIDE SERPL-SCNC: 108 MMOL/L (ref 95–110)
CO2 SERPL-SCNC: 22 MMOL/L (ref 23–29)
CREAT SERPL-MCNC: 0.7 MG/DL (ref 0.5–1.4)
CREAT UR-MCNC: 116 MG/DL (ref 15–325)
DIFFERENTIAL METHOD BLD: ABNORMAL
EOSINOPHIL # BLD AUTO: 0.1 K/UL (ref 0–0.5)
EOSINOPHIL NFR BLD: 0.8 % (ref 0–8)
ERYTHROCYTE [DISTWIDTH] IN BLOOD BY AUTOMATED COUNT: 13.8 % (ref 11.5–14.5)
EST. GFR  (NO RACE VARIABLE): >60 ML/MIN/1.73 M^2
FERRITIN SERPL-MCNC: 17 NG/ML (ref 20–300)
GLUCOSE SERPL-MCNC: 147 MG/DL (ref 70–140)
GLUCOSE SERPL-MCNC: 148 MG/DL (ref 70–110)
HCT VFR BLD AUTO: 38 % (ref 37–48.5)
HGB BLD-MCNC: 11.8 G/DL (ref 12–16)
IMM GRANULOCYTES # BLD AUTO: 0.04 K/UL (ref 0–0.04)
IMM GRANULOCYTES NFR BLD AUTO: 0.5 % (ref 0–0.5)
LYMPHOCYTES # BLD AUTO: 1.4 K/UL (ref 1–4.8)
LYMPHOCYTES NFR BLD: 15.7 % (ref 18–48)
MCH RBC QN AUTO: 29.5 PG (ref 27–31)
MCHC RBC AUTO-ENTMCNC: 31.1 G/DL (ref 32–36)
MCV RBC AUTO: 95 FL (ref 82–98)
MONOCYTES # BLD AUTO: 0.4 K/UL (ref 0.3–1)
MONOCYTES NFR BLD: 4.9 % (ref 4–15)
NEUTROPHILS # BLD AUTO: 6.8 K/UL (ref 1.8–7.7)
NEUTROPHILS NFR BLD: 77.8 % (ref 38–73)
NRBC BLD-RTO: 0 /100 WBC
PLATELET # BLD AUTO: 188 K/UL (ref 150–450)
PMV BLD AUTO: 11.5 FL (ref 9.2–12.9)
POTASSIUM SERPL-SCNC: 4.3 MMOL/L (ref 3.5–5.1)
PROT SERPL-MCNC: 6.2 G/DL (ref 6–8.4)
PROT UR-MCNC: 10 MG/DL (ref 0–15)
PROT/CREAT UR: 0.09 MG/G{CREAT} (ref 0–0.2)
RBC # BLD AUTO: 4 M/UL (ref 4–5.4)
SODIUM SERPL-SCNC: 138 MMOL/L (ref 136–145)
TREPONEMA PALLIDUM IGG+IGM AB [PRESENCE] IN SERUM OR PLASMA BY IMMUNOASSAY: NONREACTIVE
WBC # BLD AUTO: 8.79 K/UL (ref 3.9–12.7)

## 2024-08-29 PROCEDURE — 80053 COMPREHEN METABOLIC PANEL: CPT | Performed by: OBSTETRICS & GYNECOLOGY

## 2024-08-29 PROCEDURE — 86593 SYPHILIS TEST NON-TREP QUANT: CPT | Performed by: OBSTETRICS & GYNECOLOGY

## 2024-08-29 PROCEDURE — 82570 ASSAY OF URINE CREATININE: CPT | Performed by: OBSTETRICS & GYNECOLOGY

## 2024-08-29 PROCEDURE — 36415 COLL VENOUS BLD VENIPUNCTURE: CPT | Mod: PO | Performed by: OBSTETRICS & GYNECOLOGY

## 2024-08-29 PROCEDURE — 85025 COMPLETE CBC W/AUTO DIFF WBC: CPT | Performed by: OBSTETRICS & GYNECOLOGY

## 2024-08-29 PROCEDURE — 82950 GLUCOSE TEST: CPT | Performed by: OBSTETRICS & GYNECOLOGY

## 2024-08-29 PROCEDURE — 82728 ASSAY OF FERRITIN: CPT | Performed by: OBSTETRICS & GYNECOLOGY

## 2024-08-30 ENCOUNTER — TELEPHONE (OUTPATIENT)
Dept: MATERNAL FETAL MEDICINE | Facility: CLINIC | Age: 29
End: 2024-08-30
Payer: MEDICAID

## 2024-09-03 DIAGNOSIS — O99.810 ABNORMAL GLUCOSE TOLERANCE IN PREGNANCY: Primary | ICD-10-CM

## 2024-09-03 RX ORDER — FERROUS SULFATE 325(65) MG
325 TABLET ORAL
Qty: 48 TABLET | Refills: 3 | Status: SHIPPED | OUTPATIENT
Start: 2024-09-03 | End: 2025-09-03

## 2024-09-03 NOTE — PROGRESS NOTES
Please contact this patient and schedule her for 3 hour oral glucose tolerance test      Results of your recent OB lab work noted:    Unfortunately your 1 hour oral glucose tolerance test noted an elevated level.  In light of this findings we need you to have the 3 hour test performed.  An order has been placed and my office will assist you in scheduling this lab test.      Results of your CBC and ferritin were compatible with iron-deficiency.  A prescription for oral iron has been sent to your pharmacy.  Addition to your daily prenatal vitamin take one iron pill every other day.     If you have any questions or concerns please let me know.    Cameron Stringer MD  OBN [facility]

## 2024-09-04 ENCOUNTER — PROCEDURE VISIT (OUTPATIENT)
Dept: MATERNAL FETAL MEDICINE | Facility: CLINIC | Age: 29
End: 2024-09-04
Payer: MEDICAID

## 2024-09-04 ENCOUNTER — PATIENT MESSAGE (OUTPATIENT)
Dept: OBSTETRICS AND GYNECOLOGY | Facility: CLINIC | Age: 29
End: 2024-09-04
Payer: MEDICAID

## 2024-09-04 DIAGNOSIS — O09.292 HISTORY OF PRE-ECLAMPSIA IN PRIOR PREGNANCY, CURRENTLY PREGNANT IN SECOND TRIMESTER: ICD-10-CM

## 2024-09-04 DIAGNOSIS — Z36.89 ENCOUNTER FOR ULTRASOUND TO ASSESS FETAL GROWTH: ICD-10-CM

## 2024-09-04 PROCEDURE — 76816 OB US FOLLOW-UP PER FETUS: CPT | Mod: PBBFAC,PN | Performed by: STUDENT IN AN ORGANIZED HEALTH CARE EDUCATION/TRAINING PROGRAM

## 2024-09-05 ENCOUNTER — TELEPHONE (OUTPATIENT)
Dept: OBSTETRICS AND GYNECOLOGY | Facility: CLINIC | Age: 29
End: 2024-09-05
Payer: MEDICAID

## 2024-09-05 ENCOUNTER — PATIENT MESSAGE (OUTPATIENT)
Dept: OBSTETRICS AND GYNECOLOGY | Facility: CLINIC | Age: 29
End: 2024-09-05
Payer: MEDICAID

## 2024-09-05 DIAGNOSIS — O99.810 ABNORMAL GLUCOSE TOLERANCE TEST (GTT) DURING PREGNANCY, ANTEPARTUM: Primary | ICD-10-CM

## 2024-09-05 RX ORDER — INSULIN PUMP SYRINGE, 3 ML
EACH MISCELLANEOUS
Qty: 1 EACH | Refills: 0 | Status: SHIPPED | OUTPATIENT
Start: 2024-09-05

## 2024-09-05 RX ORDER — LANCETS
EACH MISCELLANEOUS
Qty: 50 EACH | Refills: 0 | Status: SHIPPED | OUTPATIENT
Start: 2024-09-05

## 2024-09-05 NOTE — TELEPHONE ENCOUNTER
Patient with abnormal 1 hour glucose tolerance test but due to issues would prefer not to take the 3 hour test.  We will notify patient and plan for blood glucose monitoring over the next few weeks 4 times daily.

## 2024-09-06 ENCOUNTER — PATIENT MESSAGE (OUTPATIENT)
Dept: OBSTETRICS AND GYNECOLOGY | Facility: CLINIC | Age: 29
End: 2024-09-06
Payer: MEDICAID

## 2024-09-06 NOTE — TELEPHONE ENCOUNTER
Contacted pt and scheduled appt on 09/12/24 with Dr Geiger to review glucose logs. Pt voiced understanding.

## 2024-09-11 ENCOUNTER — PATIENT MESSAGE (OUTPATIENT)
Dept: OTHER | Facility: OTHER | Age: 29
End: 2024-09-11
Payer: MEDICAID

## 2024-09-12 ENCOUNTER — ROUTINE PRENATAL (OUTPATIENT)
Dept: OBSTETRICS AND GYNECOLOGY | Facility: CLINIC | Age: 29
End: 2024-09-12
Payer: MEDICAID

## 2024-09-12 VITALS
DIASTOLIC BLOOD PRESSURE: 88 MMHG | WEIGHT: 195.13 LBS | HEART RATE: 92 BPM | SYSTOLIC BLOOD PRESSURE: 134 MMHG | BODY MASS INDEX: 32.47 KG/M2

## 2024-09-12 DIAGNOSIS — O09.90 SUPERVISION OF HIGH RISK PREGNANCY, ANTEPARTUM: Primary | ICD-10-CM

## 2024-09-12 PROCEDURE — 99999 PR PBB SHADOW E&M-EST. PATIENT-LVL III: CPT | Mod: PBBFAC,,, | Performed by: GENERAL PRACTICE

## 2024-09-12 PROCEDURE — 99213 OFFICE O/P EST LOW 20 MIN: CPT | Mod: PBBFAC,TH,PO | Performed by: GENERAL PRACTICE

## 2024-09-12 RX ORDER — AZELASTINE 1 MG/ML
SPRAY, METERED NASAL
COMMUNITY
Start: 2024-09-10

## 2024-09-12 NOTE — PROGRESS NOTES
TODAY'S PROGRESS NOTE  2024    JAYLEN Constantino is a 28 y.o.  at 30w1d who presents for routine OB appointment.  She denies VB, LOF, CTX's.  She reports normal FM..    O/  Vitals:    24 1448   BP: 134/88   Pulse: 92     FH: 31cm  DT'S: 135bpm by doppler    BLOOD GLUCOSE LOG:  date fasting 2h pp breakfast 2h pp lunch 2h pp dinner   05 SEP   115    6 111 103 121 107   7 87 122 111 117   8 92 122 104 109   9 98 112 119 126   10 82 110 122 124   11 99 105 102    12 91 120 120            elevated    A/P  30w1d for routine OB appointment.    Twice weekly APFT scheduled to start @ 32wks.  Serial growth US ordered as well.  Informed consent obtained for sterilization.  See below for full discussion.  Forms will be scanned to Media.  The patient plans to have her son circumcised.  Still need to obtain consent.  28wk labs reviewed with the patient.  Does not meet criteria for GDM.  May stop checking d-sticks but encouraged to avoid sweets and high-carb foods.  May try ferrous gluconate instead of ferrous sulfate for better tolerance re nausea.  PTL precautions reviewed; FMC reviewed  RTC ~36wks EGA for RENETTA, sooner prn    Susan Geiger MD   OB PROBLEM LIST:    History of term stillborn    [  ] twice weekly APFT @ 32wks    [  ] serial growth @ 28wks    [  ] delivery timing TBD (as early as 37wks prn)    History of preeclampsia with delivery at 26 weeks, baseline labs wnl    History of  section x 2, RCS scheduled  @ 38+wks  --> desires BTL, consent 24    Depression/anxiety: takes Lexapro and BuSpar   FINAL EDC:    2024 by US done 2024    Baby: boy! Miller  Circumcision: yes    SO Name: Sushil Ybarra ANATOMY SCAN:  DATE: 24 @ 19+0wks:  SIZE = DATES  ANATOMY: wnl but incomplete  PLACENTA: posterior  CERVIX: normal length     DATE: 24 @ 23+1wks:  SIZE = DATES  ANATOMY:  remains incomplete (feet)      INITIAL LABS:  Date: 5/3/2024  Type and screen: A (+) /  Negative  RPR: Negative   Rubella: Immune   Hepatitis panel:  Negative   HIV: Negative   Urine culture: No Growth   CBC: 7.2 > 13.9 / 41.6 < 213  Varicella: Immune   Hemoglobin electrophoresis: No Abnormalities     Ferritin: 129    @28wks:  Lab Results   Component Value Date    WBC 8.79 2024    HGB 11.8 (L) 2024    HCT 38.0 2024     2024    MCV 95 2024    CREATININE 0.7 2024    AST 13 2024    ALT 19 2024    BILITOT 0.3 2024      Lab Results   Component Value Date    UTPCR 0.09 2024    10-12 WEEK LABS:    PAP: PAP neg / Date:  2023    AGUSTINA/CHLAM: Negative / Negative    cfDNA ([id number]):  Low risk.  XY   28 WEEK LABS:    @ 28wks  Lab Results   Component Value Date    WBC 8.79 2024    HGB 11.8 (L) 2024    HCT 38.0 2024     2024    MCV 95 2024    FERRITIN 17 (L) 2024     Lab Results   Component Value Date    OBGLUCOSESCR 147 (H) 2024   --> did d-sticks x 1 week and does not meet criteria for GDM    Lab Results   Component Value Date    TREPABIGMIGG Nonreactive 2024      OTHER LABS:  GBS: ___   OTHER ULTRASOUNDS:    GROWTH US (24 @ 29+0wks):  EFW 1662g = 64 percentile, cephalic presentation, (3lb 11oz), feet normal   TO-DO THROUGHOUT PREGNANCY:    TDAP (27-36wks): 24    Plans to breastfeed: yes  Classes at hospital: 24    Postpartum contraception: ___  Consent for delivery: ___  BTL counselin2024   MEDICATIONS:    Prenatal vitamins   Iron QOD  ASA 81mg  Zofran prn  Lexapro  Buspar  Astelin nasal spray prn ALLERGIES:    Iodine (rash in wheezing)    MEDICAL HISTORY:    History gestational hypertension  Asthma   Depression/anxiety/PTSD    Pre-pregnancy BMI = 32 SURGICAL HISTORY:     section   Dilation and curettage    SOCIAL HISTORY:    Smoker: non-smoker  Alcohol: yes but not since +HCG  Drugs: denies  Relationship:  3/21/2024  Domestic Violence: no  Lives  with:    Education Level: Some College  Occupation: homemaker  Orthodox:  Roman Catholic  Other:   GYN HISTORY:    PAP'S: Reported history of abnormal Pap smear but abnormality not recalled  STI'S: no past history  GENITAL HSV: no FAMILY HISTORY:    HTN: Yes - mother and father  DIABETES: Yes - father  BLEEDING D/O: Yes - mother  CLOTTING D/O: Yes - father  BIRTH DEFECTS: No  MENTAL DISABILITY: No  TWINS OR MORE: No  GYN CANCER: Yes - mother  Other:     OBSTETRIC HISTORY   Month/Year Mode of Delivery EGA Wt. 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  No history of diabetes   2017  section 40  Female Failed induction of labor   2015  section 26  Female Preeclampsia        First-trimester SAB x4               STERILIZATION COUNSELING    Vira Ybarra and I reviewed a counseling from with the following information on it.  All of her questions were answered, and she desires to proceed with sterilization.  The signed form will be scanned to Epic.  I understand what the reversible alternatives are to my own permanent sterilization: IUD, Nexplanon, birth control pills, DepoProvera, contraceptive patch, NuvaRing, condoms, abstinence.  I prefer a permanent choice over other reversible methods, and I prefer to be sterilized rather than my /partner getting a vasectomy (which generally has fewer risks).  Even under the tragic potential circumstance of my /partner or child(esperanza) dying, I still would not desire to have more children.  I understand that there is a small failure rate (<<1%), but that if pregnancy does occur after BTL or salpingectomy, the risk of ectopic pregnancy is increased.  I will take a pregnancy test if I miss a period or otherwise think I might be pregnant.  Sterilization does not protect against STD's.  Surgical risks aren't significantly increased since we'll be doing a  anyway, but  risks include scarring, chronic pain, increased blood loss, and injury to bowel, bladder, blood vessels, and/or nerves.  I understand that in rare circumstances the surgeon is unable to complete the sterilization due to scarring around the tubes or uterus or other anatomical challenges.  I understand the risk of regret is 20% (if younger than age 30) based on my age group.  The planned surgery is to remove large portions of if not all of the fallopian tubes, so there will not be any tube left to put back together if I were to change my mind in the future about having more children.  The only way to get pregnant again would be with In Vitro Fertilization (IVF), which has significant cost and risk.  Benefits to this method of contraception are ease of use, permanence, and reduction of ovarian cancer risk.  I understand that tubal ligation does not regulate menses, so my periods will revert to whatever is normal for me off of hormonal contraception.  I understand that if I am a smoker, I may experience earlier menopause than if I didn't undergo sterilization.

## 2024-09-12 NOTE — PATIENT INSTRUCTIONS
To schedule classes (see below for what's offered) at the hospital, call [phone].    Have a question or concern?    PRO TIP: Program these phone numbers in your cell phone!    for an emergency  call 911 or go to the nearest hospital Especially after 20 weeks of the pregnancy, please remember that  Labor & Delivery is at [facility].  There is no L&D at [facility]  After hours you can only access L&D through the Emergency Room (entrance on [facility]).   [facility] Nurse Care Advice Line  [phone] At any time during your pregnancy,  you can speak to a nurse 24-7.   for non-urgent issues, send us a  message in [employer] Consider calling the Nurse Care Advice Line if it's a weekend or  toward the end of the work-day since  [employer] and phone messages may not be answered for a day or two.   for non-urgent issues, call the clinic  [phone], Option 3    Labor & Delivery  [phone] Starting at 20 weeks of the pregnancy,  you can speak to a nurse on L&D 24-7.     THIRD TRIMESTER  29+0 - 42 weeks    Adapting to Pregnancy: Third Trimester   Some physical discomforts may seem worse in the final weeks of pregnancy. Simple lifestyle changes can help as you keep good posture and use good body mechanics.  Pay attention to your changing center of gravity.  Be kind to yourself and know your limits - your body is hosting an entire other human!  Ask for help if you need it.  Take fiber supplements, drink lots of water, and avoid prolonged sitting or standing to prevent constipation and hemorrhoids.  Be sure you're getting enough rest: avoid caffeine after 3pm, use a warm bath to relax before bedtime, ask for back/neck/shoulder massages from your partner, try drinking warm decaf tea or milk at bedtime, get heartburn under control.  Speaking of heartburnavoid spicy / acidic / sugary foods, especially in the evenings.  Eat slowly and in small amounts, and avoiding eating during the 2  hours before bedtime.  Try sleeping with your upper body elevated.    Your To-Do List  If you haven't already, get these important things done!  A few new tasks include having the carseat ready, having hospital bags packed, and making arrangements if needed for other children and/or pets while you're in the hospital.    We'll ask you to pre-register at the hospital around 36 weeks so you have a little less paperwork to do when the big day arrives.  You can walk-in at any time.  Go in the hospital's main entrance on [address] (near the pharmacy).  Walk in to Registration, which is across from the Information Desk.  You'll need your ID and insurance cards.    More information on these topics can be found in your OB Welcome Packet and the A-Z Book:  Choose a pediatrician for your baby.  Sign up for a tour and classes at the hospital.  All classes are free of charge if you are delivering at [facility].  Call [phone] to register for any of these classes:  Baby Love (learn about the delivery process and caring for a )  Big Brother / Big Sister Class (to help siblings prepare for baby)  Lamaze (a 4 week class to learn about natural interventions for labor)  Breastfeeding (get a head start learning about breastfeeding)  Work on some methods for coping with the pains of labor.  A good bit of labor happens before you can get an epidural, and you'll feel more confident if you have a plan that you've practiced.  We encourage everyone to breastfeed if they can (and most women can!).  Please ask us questions if you have them.  Decide what you'd like to use for contraception (birth control) after this baby is born.  If you're having a boy, let us know if you plan to have him circumcised.    Things to Look Out For  The Four Questions (please read more about these in your OB Welcome Packet): 1) Baby's Movements, 2) Contractions / Cramping, 3) Vaginal bleeding, 4) Leaking fluids  Mood swings and depression - some mood swings are  expected in pregnancy, but please ask for help if you are feeling overwhelmed or sad all the time.  Headaches that don't improve with rest, drinking water, and tylenol.  Severe swelling, especially of the face and hands. Remember some swelling of the lower legs is normal, especially if you have been on your feet for some time.    Intimacy   Unless your doctor tells you not to, it's still fine to continue having sex. The third trimester though can be a challenge comfort-wise. Try different positions and see what's best for you.    Baby!  Baby kicks and stretches despite running low on room, lanugo disappears, baby gains about a half of a pound per week, and bones harden (except for the skull, which remains soft and flexible for delivery).    OTHER INFORMATION:  If your provider orders labs or other studies, you probably won't hear from us unless something is abnormal.  How we define normal is different for many things in pregnancy.  This includes several of the numbers on a Complete Blood Count (CBC).  If your result is flagged as abnormal in MyChart but you don't hear from us, it's probably because things are actually normal based on where you are in your pregnancy.

## 2024-09-17 ENCOUNTER — HOSPITAL ENCOUNTER (OUTPATIENT)
Facility: HOSPITAL | Age: 29
Discharge: HOME | End: 2024-09-17
Attending: OBSTETRICS & GYNECOLOGY | Admitting: OBSTETRICS & GYNECOLOGY
Payer: MEDICAID

## 2024-09-17 VITALS — DIASTOLIC BLOOD PRESSURE: 71 MMHG | HEART RATE: 74 BPM | SYSTOLIC BLOOD PRESSURE: 110 MMHG | RESPIRATION RATE: 17 BRPM

## 2024-09-17 DIAGNOSIS — Z3A.30 30 WEEKS GESTATION OF PREGNANCY: Primary | ICD-10-CM

## 2024-09-17 DIAGNOSIS — O09.292 PRIOR POOR OBSTETRICAL HISTORY IN SECOND TRIMESTER, ANTEPARTUM: ICD-10-CM

## 2024-09-17 DIAGNOSIS — R51.9 HEADACHE: ICD-10-CM

## 2024-09-17 LAB
ALBUMIN SERPL BCP-MCNC: 3.3 G/DL (ref 3.5–5.2)
ALP SERPL-CCNC: 89 U/L (ref 55–135)
ALT SERPL W/O P-5'-P-CCNC: 12 U/L (ref 10–44)
ANION GAP SERPL CALC-SCNC: 9 MMOL/L (ref 8–16)
AST SERPL-CCNC: 12 U/L (ref 10–40)
BACTERIA #/AREA URNS HPF: NORMAL /HPF
BASOPHILS # BLD AUTO: 0.03 K/UL (ref 0–0.2)
BASOPHILS NFR BLD: 0.4 % (ref 0–1.9)
BILIRUB SERPL-MCNC: 0.3 MG/DL (ref 0.1–1)
BILIRUB UR QL STRIP: NEGATIVE
BUN SERPL-MCNC: 7 MG/DL (ref 6–20)
CALCIUM SERPL-MCNC: 8.3 MG/DL (ref 8.7–10.5)
CHLORIDE SERPL-SCNC: 110 MMOL/L (ref 95–110)
CLARITY UR: ABNORMAL
CO2 SERPL-SCNC: 20 MMOL/L (ref 23–29)
COLOR UR: YELLOW
CREAT SERPL-MCNC: 0.6 MG/DL (ref 0.5–1.4)
CREAT UR-MCNC: 108 MG/DL (ref 15–325)
DIFFERENTIAL METHOD BLD: ABNORMAL
EOSINOPHIL # BLD AUTO: 0.1 K/UL (ref 0–0.5)
EOSINOPHIL NFR BLD: 1 % (ref 0–8)
ERYTHROCYTE [DISTWIDTH] IN BLOOD BY AUTOMATED COUNT: 13.6 % (ref 11.5–14.5)
EST. GFR  (NO RACE VARIABLE): >60 ML/MIN/1.73 M^2
GLUCOSE SERPL-MCNC: 114 MG/DL (ref 70–110)
GLUCOSE UR QL STRIP: ABNORMAL
HCT VFR BLD AUTO: 33.5 % (ref 37–48.5)
HGB BLD-MCNC: 10.7 G/DL (ref 12–16)
HGB UR QL STRIP: NEGATIVE
IMM GRANULOCYTES # BLD AUTO: 0.04 K/UL (ref 0–0.04)
IMM GRANULOCYTES NFR BLD AUTO: 0.6 % (ref 0–0.5)
KETONES UR QL STRIP: NEGATIVE
LEUKOCYTE ESTERASE UR QL STRIP: ABNORMAL
LYMPHOCYTES # BLD AUTO: 1.3 K/UL (ref 1–4.8)
LYMPHOCYTES NFR BLD: 17.9 % (ref 18–48)
MCH RBC QN AUTO: 28.3 PG (ref 27–31)
MCHC RBC AUTO-ENTMCNC: 31.9 G/DL (ref 32–36)
MCV RBC AUTO: 89 FL (ref 82–98)
MICROSCOPIC COMMENT: NORMAL
MONOCYTES # BLD AUTO: 0.4 K/UL (ref 0.3–1)
MONOCYTES NFR BLD: 5.7 % (ref 4–15)
NEUTROPHILS # BLD AUTO: 5.4 K/UL (ref 1.8–7.7)
NEUTROPHILS NFR BLD: 74.4 % (ref 38–73)
NITRITE UR QL STRIP: NEGATIVE
NRBC BLD-RTO: 0 /100 WBC
PH UR STRIP: 7 [PH] (ref 5–8)
PLATELET # BLD AUTO: 160 K/UL (ref 150–450)
PMV BLD AUTO: 10.1 FL (ref 9.2–12.9)
POTASSIUM SERPL-SCNC: 3.7 MMOL/L (ref 3.5–5.1)
PROT SERPL-MCNC: 5.7 G/DL (ref 6–8.4)
PROT UR QL STRIP: ABNORMAL
PROT UR-MCNC: 16 MG/DL (ref 6–15)
PROT/CREAT UR: 0.15 MG/G{CREAT} (ref 0–0.2)
RBC # BLD AUTO: 3.78 M/UL (ref 4–5.4)
RBC #/AREA URNS HPF: 1 /HPF (ref 0–4)
SODIUM SERPL-SCNC: 139 MMOL/L (ref 136–145)
SP GR UR STRIP: 1.02 (ref 1–1.03)
SQUAMOUS #/AREA URNS HPF: 2 /HPF
URN SPEC COLLECT METH UR: ABNORMAL
UROBILINOGEN UR STRIP-ACNC: NEGATIVE EU/DL
WBC # BLD AUTO: 7.2 K/UL (ref 3.9–12.7)
WBC #/AREA URNS HPF: 5 /HPF (ref 0–5)

## 2024-09-17 PROCEDURE — 81001 URINALYSIS AUTO W/SCOPE: CPT | Performed by: OBSTETRICS & GYNECOLOGY

## 2024-09-17 PROCEDURE — 80053 COMPREHEN METABOLIC PANEL: CPT | Performed by: OBSTETRICS & GYNECOLOGY

## 2024-09-17 PROCEDURE — 85025 COMPLETE CBC W/AUTO DIFF WBC: CPT | Performed by: OBSTETRICS & GYNECOLOGY

## 2024-09-17 PROCEDURE — 99211 OFF/OP EST MAY X REQ PHY/QHP: CPT

## 2024-09-17 PROCEDURE — 82570 ASSAY OF URINE CREATININE: CPT | Performed by: OBSTETRICS & GYNECOLOGY

## 2024-09-17 PROCEDURE — 99221 1ST HOSP IP/OBS SF/LOW 40: CPT | Mod: ,,, | Performed by: OBSTETRICS & GYNECOLOGY

## 2024-09-17 PROCEDURE — 36415 COLL VENOUS BLD VENIPUNCTURE: CPT | Performed by: OBSTETRICS & GYNECOLOGY

## 2024-09-17 NOTE — DISCHARGE INSTRUCTIONS
Keep your scheduled appointment with your provider.    Call your Doctor if you have any of the following:  Temperature above 100 degrees  Nausea, vomiting and/or diarrhea  Severe headache, dizziness, or blurred vision  Notable increase in swelling of hands or feet  Notable swelling of face and lips  Difficulty, pain or burning with urination  Foul smelling vaginal discharge  Decreased fetal movement    Come to the hospital if you have any of the following symptoms:  Your water breaks  More than 4-6 contractions in 1 hour for 2 or more hours  Vaginal bleeding like a period    After 28 weeks, you should feel 10 distinct fetal movements within a 2 hour period.    It is recommended that you drink 1/2 a gallon of water each day.  Tea, Soda and Juice are  in addition to this.    Labor and Delivery Phone number: [phone]    If you need to be seen on Labor and delivery between the hours of 6pm and 7am, please enter through the Emergency room entrance.

## 2024-09-17 NOTE — NURSING
1630- Pt d/c home. Discharge papers with instructions to come back to L&D if symptoms get worse and to keep next scheduled appointment with OB
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
## 2024-09-23 ENCOUNTER — HOSPITAL ENCOUNTER (OUTPATIENT)
Dept: OBSTETRICS AND GYNECOLOGY | Facility: CLINIC | Age: 29
Discharge: HOME | End: 2024-09-23
Attending: OBSTETRICS & GYNECOLOGY
Payer: MEDICAID

## 2024-09-23 ENCOUNTER — ROUTINE PRENATAL (OUTPATIENT)
Dept: OBSTETRICS AND GYNECOLOGY | Facility: CLINIC | Age: 29
End: 2024-09-23
Payer: MEDICAID

## 2024-09-23 ENCOUNTER — HOSPITAL ENCOUNTER (OUTPATIENT)
Facility: HOSPITAL | Age: 29
Discharge: HOME | End: 2024-09-24
Attending: OBSTETRICS & GYNECOLOGY | Admitting: OBSTETRICS & GYNECOLOGY
Payer: MEDICAID

## 2024-09-23 VITALS
WEIGHT: 196.13 LBS | BODY MASS INDEX: 32.63 KG/M2 | DIASTOLIC BLOOD PRESSURE: 80 MMHG | HEART RATE: 82 BPM | SYSTOLIC BLOOD PRESSURE: 122 MMHG

## 2024-09-23 DIAGNOSIS — O09.90 SUPERVISION OF HIGH RISK PREGNANCY, ANTEPARTUM: ICD-10-CM

## 2024-09-23 DIAGNOSIS — O09.293 PRIOR POOR OBSTETRICAL HISTORY IN THIRD TRIMESTER, ANTEPARTUM: ICD-10-CM

## 2024-09-23 DIAGNOSIS — Z3A.31 31 WEEKS GESTATION OF PREGNANCY: Primary | ICD-10-CM

## 2024-09-23 DIAGNOSIS — Z3A.31 31 WEEKS GESTATION OF PREGNANCY: ICD-10-CM

## 2024-09-23 DIAGNOSIS — O09.292 PRIOR POOR OBSTETRICAL HISTORY IN SECOND TRIMESTER, ANTEPARTUM: ICD-10-CM

## 2024-09-23 DIAGNOSIS — Z3A.19 19 WEEKS GESTATION OF PREGNANCY: ICD-10-CM

## 2024-09-23 PROCEDURE — 36415 COLL VENOUS BLD VENIPUNCTURE: CPT | Performed by: OBSTETRICS & GYNECOLOGY

## 2024-09-23 PROCEDURE — G0378 HOSPITAL OBSERVATION PER HR: HCPCS

## 2024-09-23 PROCEDURE — 99999 PR PBB SHADOW E&M-EST. PATIENT-LVL III: CPT | Mod: PBBFAC,,, | Performed by: OBSTETRICS & GYNECOLOGY

## 2024-09-23 PROCEDURE — 99213 OFFICE O/P EST LOW 20 MIN: CPT | Mod: PBBFAC,TH,PO | Performed by: OBSTETRICS & GYNECOLOGY

## 2024-09-23 PROCEDURE — 76819 FETAL BIOPHYS PROFIL W/O NST: CPT | Mod: 26,,, | Performed by: OBSTETRICS & GYNECOLOGY

## 2024-09-23 PROCEDURE — 25000003 PHARM REV CODE 250: Performed by: OBSTETRICS & GYNECOLOGY

## 2024-09-23 PROCEDURE — 59025 FETAL NON-STRESS TEST: CPT | Mod: PBBFAC,PO | Performed by: OBSTETRICS & GYNECOLOGY

## 2024-09-23 PROCEDURE — 59025 FETAL NON-STRESS TEST: CPT

## 2024-09-23 RX ORDER — ONDANSETRON 4 MG/1
8 TABLET, ORALLY DISINTEGRATING ORAL EVERY 8 HOURS PRN
Status: DISCONTINUED | OUTPATIENT
Start: 2024-09-23 | End: 2024-09-24 | Stop reason: HOSPADM

## 2024-09-23 RX ORDER — PRENATAL WITH FERROUS FUM AND FOLIC ACID 3080; 920; 120; 400; 22; 1.84; 3; 20; 10; 1; 12; 200; 27; 25; 2 [IU]/1; [IU]/1; MG/1; [IU]/1; MG/1; MG/1; MG/1; MG/1; MG/1; MG/1; UG/1; MG/1; MG/1; MG/1; MG/1
1 TABLET ORAL DAILY
Status: CANCELLED | OUTPATIENT
Start: 2024-09-23

## 2024-09-23 RX ORDER — ACETAMINOPHEN 325 MG/1
650 TABLET ORAL EVERY 6 HOURS PRN
Status: CANCELLED | OUTPATIENT
Start: 2024-09-23

## 2024-09-23 RX ORDER — ACETAMINOPHEN 325 MG/1
650 TABLET ORAL EVERY 6 HOURS PRN
Status: DISCONTINUED | OUTPATIENT
Start: 2024-09-23 | End: 2024-09-24 | Stop reason: HOSPADM

## 2024-09-23 RX ORDER — ONDANSETRON 8 MG/1
8 TABLET, ORALLY DISINTEGRATING ORAL EVERY 8 HOURS PRN
Status: CANCELLED | OUTPATIENT
Start: 2024-09-23

## 2024-09-23 RX ORDER — FAMOTIDINE 20 MG/1
20 TABLET, FILM COATED ORAL 2 TIMES DAILY
Status: DISCONTINUED | OUTPATIENT
Start: 2024-09-23 | End: 2024-09-24 | Stop reason: HOSPADM

## 2024-09-23 RX ORDER — DIPHENHYDRAMINE HCL 25 MG
25 CAPSULE ORAL EVERY 4 HOURS PRN
Status: CANCELLED | OUTPATIENT
Start: 2024-09-23

## 2024-09-23 RX ORDER — SODIUM CHLORIDE 0.9 % (FLUSH) 0.9 %
10 SYRINGE (ML) INJECTION
Status: DISCONTINUED | OUTPATIENT
Start: 2024-09-23 | End: 2024-09-24 | Stop reason: HOSPADM

## 2024-09-23 RX ORDER — PRENATAL WITH FERROUS FUM AND FOLIC ACID 3080; 920; 120; 400; 22; 1.84; 3; 20; 10; 1; 12; 200; 27; 25; 2 [IU]/1; [IU]/1; MG/1; [IU]/1; MG/1; MG/1; MG/1; MG/1; MG/1; MG/1; UG/1; MG/1; MG/1; MG/1; MG/1
1 TABLET ORAL DAILY
Status: DISCONTINUED | OUTPATIENT
Start: 2024-09-23 | End: 2024-09-23

## 2024-09-23 RX ORDER — SODIUM CHLORIDE 0.9 % (FLUSH) 0.9 %
10 SYRINGE (ML) INJECTION
Status: CANCELLED | OUTPATIENT
Start: 2024-09-23

## 2024-09-23 RX ORDER — SIMETHICONE 80 MG
1 TABLET,CHEWABLE ORAL EVERY 6 HOURS PRN
Status: CANCELLED | OUTPATIENT
Start: 2024-09-23

## 2024-09-23 RX ORDER — ASPIRIN 81 MG/1
81 TABLET ORAL DAILY
Status: DISCONTINUED | OUTPATIENT
Start: 2024-09-23 | End: 2024-09-24 | Stop reason: HOSPADM

## 2024-09-23 RX ORDER — LANOLIN ALCOHOL/MO/W.PET/CERES
1 CREAM (GRAM) TOPICAL DAILY
Status: DISCONTINUED | OUTPATIENT
Start: 2024-09-23 | End: 2024-09-24 | Stop reason: HOSPADM

## 2024-09-23 RX ORDER — DIPHENHYDRAMINE HYDROCHLORIDE 50 MG/ML
25 INJECTION INTRAMUSCULAR; INTRAVENOUS EVERY 4 HOURS PRN
Status: CANCELLED | OUTPATIENT
Start: 2024-09-23

## 2024-09-23 RX ORDER — BUSPIRONE HYDROCHLORIDE 5 MG/1
5 TABLET ORAL 2 TIMES DAILY
Status: DISCONTINUED | OUTPATIENT
Start: 2024-09-23 | End: 2024-09-23

## 2024-09-23 RX ORDER — LANOLIN ALCOHOL/MO/W.PET/CERES
1 CREAM (GRAM) TOPICAL DAILY
Status: DISCONTINUED | OUTPATIENT
Start: 2024-09-24 | End: 2024-09-23

## 2024-09-23 RX ORDER — PRENATAL WITH FERROUS FUM AND FOLIC ACID 3080; 920; 120; 400; 22; 1.84; 3; 20; 10; 1; 12; 200; 27; 25; 2 [IU]/1; [IU]/1; MG/1; [IU]/1; MG/1; MG/1; MG/1; MG/1; MG/1; MG/1; UG/1; MG/1; MG/1; MG/1; MG/1
1 TABLET ORAL DAILY
Status: DISCONTINUED | OUTPATIENT
Start: 2024-09-24 | End: 2024-09-23

## 2024-09-23 RX ORDER — ESCITALOPRAM OXALATE 10 MG/1
20 TABLET ORAL DAILY
Status: DISCONTINUED | OUTPATIENT
Start: 2024-09-24 | End: 2024-09-23

## 2024-09-23 RX ORDER — AMOXICILLIN 250 MG
1 CAPSULE ORAL NIGHTLY PRN
Status: CANCELLED | OUTPATIENT
Start: 2024-09-23

## 2024-09-23 RX ORDER — DIPHENHYDRAMINE HYDROCHLORIDE 50 MG/ML
25 INJECTION INTRAMUSCULAR; INTRAVENOUS EVERY 4 HOURS PRN
Status: DISCONTINUED | OUTPATIENT
Start: 2024-09-23 | End: 2024-09-24 | Stop reason: HOSPADM

## 2024-09-23 RX ORDER — FAMOTIDINE 20 MG/1
20 TABLET, FILM COATED ORAL 2 TIMES DAILY
Status: DISCONTINUED | OUTPATIENT
Start: 2024-09-23 | End: 2024-09-23

## 2024-09-23 RX ORDER — DIPHENHYDRAMINE HCL 25 MG
25 CAPSULE ORAL EVERY 4 HOURS PRN
Status: DISCONTINUED | OUTPATIENT
Start: 2024-09-23 | End: 2024-09-24 | Stop reason: HOSPADM

## 2024-09-23 RX ORDER — SIMETHICONE 80 MG
1 TABLET,CHEWABLE ORAL EVERY 6 HOURS PRN
Status: DISCONTINUED | OUTPATIENT
Start: 2024-09-23 | End: 2024-09-24 | Stop reason: HOSPADM

## 2024-09-23 RX ORDER — AMOXICILLIN 250 MG
1 CAPSULE ORAL NIGHTLY PRN
Status: DISCONTINUED | OUTPATIENT
Start: 2024-09-23 | End: 2024-09-24 | Stop reason: HOSPADM

## 2024-09-23 RX ORDER — ASPIRIN 81 MG/1
81 TABLET ORAL DAILY
Status: DISCONTINUED | OUTPATIENT
Start: 2024-09-24 | End: 2024-09-23

## 2024-09-23 RX ORDER — PRENATAL WITH FERROUS FUM AND FOLIC ACID 3080; 920; 120; 400; 22; 1.84; 3; 20; 10; 1; 12; 200; 27; 25; 2 [IU]/1; [IU]/1; MG/1; [IU]/1; MG/1; MG/1; MG/1; MG/1; MG/1; MG/1; UG/1; MG/1; MG/1; MG/1; MG/1
1 TABLET ORAL DAILY
Status: DISCONTINUED | OUTPATIENT
Start: 2024-09-23 | End: 2024-09-24 | Stop reason: HOSPADM

## 2024-09-23 RX ORDER — ESCITALOPRAM OXALATE 10 MG/1
20 TABLET ORAL DAILY
Status: DISCONTINUED | OUTPATIENT
Start: 2024-09-23 | End: 2024-09-24 | Stop reason: HOSPADM

## 2024-09-23 RX ORDER — BUSPIRONE HYDROCHLORIDE 5 MG/1
5 TABLET ORAL 2 TIMES DAILY
Status: DISCONTINUED | OUTPATIENT
Start: 2024-09-23 | End: 2024-09-24 | Stop reason: HOSPADM

## 2024-09-23 RX ADMIN — FERROUS SULFATE TAB 325 MG (65 MG ELEMENTAL FE) 1 EACH: 325 (65 FE) TAB at 09:09

## 2024-09-23 RX ADMIN — FAMOTIDINE 20 MG: 20 TABLET ORAL at 09:09

## 2024-09-23 RX ADMIN — PRENATAL VIT W/ FE FUMARATE-FA TAB 27-0.8 MG 1 TABLET: 27-0.8 TAB at 09:09

## 2024-09-23 RX ADMIN — ESCITALOPRAM OXALATE 20 MG: 10 TABLET ORAL at 09:09

## 2024-09-23 RX ADMIN — ASPIRIN 81 MG: 81 TABLET, COATED ORAL at 09:09

## 2024-09-23 RX ADMIN — BUSPIRONE HYDROCHLORIDE 5 MG: 5 TABLET ORAL at 09:09
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
## 2024-09-23 NOTE — NURSING
1500-pt sent to L&D by Dr. Stringer for overnight observation with continuous monitoring for possible fetal deceleration

## 2024-09-23 NOTE — H&P
Prenatal Note   Chief Complaint:  Non-stress Test (Prolonged monitoring)       Patient ID: Vira Ybarra is a  29 y.o. female.    Date: 2024    On license of [facility]  Department of Obstetrics & Gynecology  OB History & Physical    PATIENT NAME: Vira Ybarra    MRN: [id number]  TODAY'S DATE: 2024  ADMIT DATE: 2024    Reason for Observation:  Prolonged fetal monitoring    Prenatal Care: [facility] (Dr. Stringer / Juanjo)     History of Present Illness:    Vira Ybarra is a 29 y.o.   who presents 2024 at 31w5d. Estimated Date of Delivery: 24.     The patient was seen earlier in the day for antepartum testing.  While NST was reactive and reassuring there was an area of potential prolonged deceleration.  In light of this finding biophysical ultrasound was performed which was 6/8.    She reports appropriate fetal movement.  No significant contractions.  No vaginal bleeding and no rupture of membranes.    In light of the above the patient was referred to labor and delivery for prolonged fetal monitoring.      Review of patient's allergies indicates:   Allergen Reactions    Latex, natural rubber Anaphylaxis    Iodine and iodide containing products Rash       OB History    Para Term  AB Living   5 3 2 1 1 2   SAB IAB Ectopic Multiple Live Births   1       2      # Outcome Date GA Lbr Enrique/2nd Weight Sex Type Anes PTL Lv   5 Current            4 SAB 10/2023           3  19 26w0d  0.539 kg (1 lb 3 oz) F CS-Unspec   GILBERT      Complications: Pre-eclampsia   2 Term 17   5.925 kg (13 lb 1 oz) F CS-Unspec   GILBERT   1 Term  41w0d   M Vag-Spont   FD       Past Medical History:   Diagnosis Date    Abnormal Pap smear of cervix     Anemia     Anxiety disorder, unspecified     Depression        Past Surgical History:   Procedure Laterality Date     SECTION      x2       No current facility-administered medications on  file prior to encounter.     Current Outpatient Medications on File Prior to Encounter   Medication Sig Dispense Refill    aspirin (ECOTRIN) 81 MG EC tablet Take 1 tablet (81 mg total) by mouth once daily. 90 tablet 3    busPIRone (BUSPAR) 5 MG Tab Take 1 tablet (5 mg total) by mouth 2 (two) times daily as needed (Anxiety). As needed for anxiety 60 tablet 11    EScitalopram oxalate (LEXAPRO) 20 MG tablet Take 20 mg by mouth 2 (two) times daily.      ferrous sulfate (FEOSOL) 325 mg (65 mg iron) Tab tablet Take 1 tablet (325 mg total) by mouth 4 (four) times a week. 48 tablet 3    prenatal vit no.130-iron-folic (PRENATAL VITAMIN) 27 mg iron- 800 mcg Tab Take 1 tablet by mouth once daily. 30 tablet 11    azelastine (ASTELIN) 137 mcg (0.1 %) nasal spray SMARTSIG:Both Nares      ondansetron (ZOFRAN-ODT) 4 MG TbDL Take 1 tablet (4 mg total) by mouth every 6 (six) hours as needed (Nausea). 20 tablet 0       Family History   Problem Relation Name Age of Onset    Breast cancer Mother      Cervical cancer Mother      Ovarian cancer Mother         Review of Systems:  Review of Systems   Constitutional:  Negative for fever.   Respiratory:  Negative for cough and shortness of breath.    Cardiovascular:  Negative for chest pain.   Gastrointestinal:  Negative for abdominal pain, constipation, diarrhea and nausea.   Genitourinary:  Negative for dysuria.   Skin:  Negative for rash.   Neurological:  Positive for headaches.   Psychiatric/Behavioral:  The patient is nervous/anxious.      Vitals:    09/23/24 1738   BP: (!) 138/95   Pulse: 77   Resp: 18       Physical Exam:  Physical Exam  Constitutional:       Appearance: Normal appearance.   HENT:      Head: Normocephalic.   Cardiovascular:      Rate and Rhythm: Normal rate.   Pulmonary:      Effort: Pulmonary effort is normal.   Neurological:      General: No focal deficit present.      Mental Status: She is alert.      Deep Tendon Reflexes: Reflexes are normal and symmetric.    Skin:     General: Skin is warm and dry.   Psychiatric:         Mood and Affect: Mood normal.       Fetal assessment:   Prolonged fetal monitoring since admission has been reactive and reassuring with no significant decelerations, no active contractions appropriate variability and appropriate accelerations.  Category one      Assessment:   29 y.o.,   at 31w5d Gestation   History of term term stillborn  History of  section x 2  Depression/anxiety: Currently on Lexapro and BuSpar    Reassuring maternal & fetal status    Plan:  The above was reviewed discussed with the patient.  Fetal assessment has been reassuring but due to anxiety we will continue to monitor overnight with plans for repeat BPP in a.m..    The patient's questions regarding the above were answered and she is in agreement with the current plan.    Cameron Stringer MD FACOG  Date of Service: 2024    OB PROBLEM LIST:  History of term term stillborn   [ ] Twice weekly antepartum testing starting at 32 weeks   [ ] Serial fetal growth ultrasounds every 4-6 weeks, starting at 26-28 weeks   [ ] Delivery at 38 0/7 - 38 6/7 weeks gestation, unless indicated earlier by maternal or obstetric condition (e.g. diabetes, etc)  Delivery may be individualized and considered as early as 37 0/7 weeks, in patients with significant maternal anxiety who are well-counseled by MFM regarding the potential risks of early term delivery   History of preeclampsia with delivery at 26 weeks   [ ] Obtain baseline preeclampsia studies: (CMP, CBC, 24 hour urine protein or urine protein/creatinine ratio) - ordered  History of  section x 2  Depression/anxiety: Currently on Lexapro and BuSpar    She is scheduled for a REPEAT  SECTION AND OTHER INDICATED PROCEDURES on 2024 (38-6/7 weeks)   FINAL EDC:    2024 by US done 2024      SO Name: Sushil Ybarra ANATOMY SCAN:  US [facility] 2024  Indication: Anemia complicating  pregnancy  Indication: Fetal Anatomic Survey  Indication: Stillbirth, Prior    Impression: No fetal structural malformations are identified; however, fetal imaging is incomplete today.  A follow-up study will be scheduled to complete the fetal anatomic survey.  Fetal size today is consistent with established gestational age.  Cervical length by TA scanning is normal.  Placental location is posterior without evidence of previa.      INITIAL LABS:  Date: 5/3/2024  Type and screen: A (+) / Negative  RPR: Negative   Rubella: Immune   Hepatitis panel:  Negative   HIV: Negative   Urine culture: No Growth   CBC: 7.2 > 13.9 / 41.6 < 213  Varicella: Immune   Hemoglobin electrophoresis: No Abnormalities     Ferritin: 129     10-12 WEEK LABS:    PAP: PAP neg / Date:  2023    AGUSTINA/CHLAM: Negative / Negative    cfDNA (Niogdkos97):  Low risk.  XY    CARRIER SCREEN (Inheritest Core):  Pending   28 WEEK LABS:    CBC:   1Hr OGTT:  Ferritin:      OTHER LABS:  GBS: ___   OTHER ULTRASOUNDS:  US [facility] 2024  Indication: Follow-up Consult:  Indication: Stillbirth, Prior  Indication: Assess Fetal Growth  Indication:  Screening - Follow-Up Anatomy    Impression: No fetal structural malformations are identified today; however, the fetal anatomic survey remains incomplete, as noted in the 'fetal anatomy' section of this report.  If further ultrasound exams are planned for other indications, will attempt completion of the anatomic survey at that time. No other routine f/u exams to attempt completion  of the anatomic survey will be scheduled by [facility].  Fetal size is AGA, and the AFV is normal.   TO-DO THROUGHOUT PREGNANCY:    Depression Screen (20wks):  2024  TDAP (27-36wks):  2024    Birth Plan: ___  Plans to breastfeed: ___  Plans for epidural: ___  Classes at hospital: ___    Postpartum contraception: ___  Consent for delivery: ___  TOLAC counseling: ___  BTL counseling: ___   MEDICATIONS:    Prenatal vitamins    Vitamin B6 ALLERGIES:    Iodine (rash in wheezing)    MEDICAL HISTORY:    History gestational hypertension  Asthma   Depression/anxiety/PTSD    Pre-pregnancy BMI = 32 SURGICAL HISTORY:     section   Dilation and curettage    SOCIAL HISTORY:    Smoker: non-smoker  Alcohol: yes but not since +HCG  Drugs: denies  Relationship:  3/21/2024  Domestic Violence: no  Lives with:    Education Level: Some College  Occupation: homemaker  Sikh:  Voodoo  Other:   GYN HISTORY:    PAP'S:  Reported history of abnormal Pap smear but abnormality not recalled  STI'S: no past history  GENITAL HSV: no FAMILY HISTORY:    HTN: Yes - mother and father  DIABETES: Yes - father  BLEEDING D/O: Yes - mother  CLOTTING D/O: Yes - father  BIRTH DEFECTS: No  MENTAL DISABILITY: No  TWINS OR MORE: No  GYN CANCER: Yes - mother  Other:     OBSTETRIC HISTORY   Month/Year Mode of Delivery EGA Wt. 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  No history of diabetes   2017  section 40  Female Failed induction of labor   2015  section 26  Female Preeclampsia        First-trimester SAB x4             Plan:      31 weeks gestation of pregnancy  -     Vital signs, on admit ; Standing  -     Vital signs; Standing  -     Intake and output; Standing  -     Notify physician; Standing  -     Insert peripheral IV; Standing  -     Full code; Standing  -     Diet Adult Regular; Standing  -     Place sequential compression device; Standing  -     Fetal monitoring; Standing  -     Place in Observation; Standing    Supervision of high risk pregnancy, antepartum  -     Vital signs, on admit ; Standing  -     Vital signs; Standing  -     Intake and output; Standing  -     Notify physician; Standing  -     Insert peripheral IV; Standing  -     Full code; Standing  -     Diet Adult Regular; Standing  -     Place sequential compression device; Standing  -      Fetal monitoring; Standing  -     Place in Observation; Standing    Prior poor obstetrical history in third trimester, antepartum  -     Vital signs, on admit ; Standing  -     Vital signs; Standing  -     Intake and output; Standing  -     Notify physician; Standing  -     Insert peripheral IV; Standing  -     Full code; Standing  -     Diet Adult Regular; Standing  -     Place sequential compression device; Standing  -     Fetal monitoring; Standing  -     Place in Observation; Standing    Other orders  -     sodium chloride 0.9% flush 10 mL  -     IP VTE LOW RISK PATIENT; Standing  -     acetaminophen tablet 650 mg  -     ondansetron disintegrating tablet 8 mg  -     senna-docusate 8.6-50 mg per tablet 1 tablet  -     simethicone chewable tablet 80 mg  -     diphenhydrAMINE capsule 25 mg  -     diphenhydrAMINE injection 25 mg  -     prenatal vitamin oral tablet        Cameron Stringer MD  Department OBGYN

## 2024-09-24 VITALS
OXYGEN SATURATION: 98 % | SYSTOLIC BLOOD PRESSURE: 118 MMHG | RESPIRATION RATE: 16 BRPM | TEMPERATURE: 98 F | DIASTOLIC BLOOD PRESSURE: 82 MMHG | HEART RATE: 81 BPM

## 2024-09-24 PROBLEM — O09.293 PRIOR POOR OBSTETRICAL HISTORY IN THIRD TRIMESTER, ANTEPARTUM: Status: ACTIVE | Noted: 2024-06-26

## 2024-09-24 PROBLEM — Z3A.31 31 WEEKS GESTATION OF PREGNANCY: Status: ACTIVE | Noted: 2024-09-24

## 2024-09-24 PROCEDURE — 99231 SBSQ HOSP IP/OBS SF/LOW 25: CPT | Mod: ,,, | Performed by: OBSTETRICS & GYNECOLOGY

## 2024-09-24 PROCEDURE — G0378 HOSPITAL OBSERVATION PER HR: HCPCS

## 2024-09-24 NOTE — DISCHARGE INSTRUCTIONS
Keep your scheduled appointment with your provider.    Call your Doctor if you have any of the following:  Temperature above 100 degrees  Nausea, vomiting and/or diarrhea  Severe headache, dizziness, or blurred vision  Notable increase in swelling of hands or feet  Notable swelling of face and lips  Difficulty, pain or burning with urination  Foul smelling vaginal discharge  Decreased fetal movement    Come to the hospital if you have any of the following symptoms:  Your water breaks  More than 4-6 contractions in 1 hour for 2 or more hours  Vaginal bleeding like a period    After 28 weeks, you should feel 10 distinct fetal movements within a 2 hour period.    It is recommended that you drink 1/2 a gallon of water each day.  Tea, Soda and Juice are  in addition to this.    Labor and Delivery Phone number: [phone]    If you need to be seen on Labor and delivery between the hours of 6pm and 7am, please enter through the Emergency room entrance.

## 2024-09-24 NOTE — DISCHARGE SUMMARY
[facility]  Department of Obstetrics & Gynecology  Antepartum Discharge Summary      PATIENT NAME: Vira Ybarra    MRN: [id number]  TODAY'S DATE: 2024  ADMIT DATE: 2024    Discharge Date: 2024    Attending Physician: Cameron Stringer M.D., FACOG     Reason for Admission:  Prolonged monitoring due to nonreassuring NST    Chief Complaint:  Non-stress Test (Prolonged monitoring)       Patient ID: Vira Ybarra is a  29 y.o. female.  The patient is a 29 y.o. G 5  at 31-6/7 weeks     Hospital Course:    The patient was seen in the office on 2024 for antepartum testing.  At that time NST noted a prolonged deceleration ultrasound biophysical was performed which was .  In light of these findings and the patient's obstetrical issue she was observed overnight for prolonged monitoring.    The patient currently reports no issues.    Vaginal bleeding:  Negative   Significant contractions: Negative   Rupture of membranes: Negative    Vital signs reviewed.  Last blood pressure:  118/82  Vitals:    24 0717   BP: 118/82   Pulse: 81   Resp: 16   Temp: 98 °F (36.7 °C)     Heart: Regular rate and rhythm  Lungs:  No abnormal pulmonary effort.  Clear to auscultation.  No pulmonary or cardiac issues noted.    Abdomen is gravid with appropriate FH.  No significant tenderness.      Fetal Status:  Reassuring    The patient was monitored overnight with reassuring fetal heart tones.  Prior to discharge ultrasound biophysical profile was performed which was .    Discharge Diet:  Regular    Discharge Activity:  As tolerated    Discharge Medications:      Medication List        ASK your doctor about these medications      aspirin 81 MG EC tablet  Commonly known as: ECOTRIN  Take 1 tablet (81 mg total) by mouth once daily.     azelastine 137 mcg (0.1 %) nasal spray  Commonly known as: ASTELIN     busPIRone 5 MG Tab  Commonly known as: BUSPAR  Take 1 tablet (5 mg total) by  mouth 2 (two) times daily as needed (Anxiety). As needed for anxiety     EScitalopram oxalate 20 MG tablet  Commonly known as: LEXAPRO     ferrous sulfate 325 mg (65 mg iron) Tab tablet  Commonly known as: FEOSOL  Take 1 tablet (325 mg total) by mouth 4 (four) times a week.     ondansetron 4 MG Tbdl  Commonly known as: ZOFRAN-ODT  Take 1 tablet (4 mg total) by mouth every 6 (six) hours as needed (Nausea).     PRENATAL VITAMIN 27 mg iron- 0.8 mg Tab  Generic drug: prenatal vit no.130-iron-folic  Take 1 tablet by mouth once daily.               Discharge Follow-Up: 1 Weeks or as needed with Dr Stringer    Condition on Discharge:  stable    Discharge Diagnosis:   Intrauterine pregnancy at 31-,6/7 weeks with poor previous obstetrical history    The above was reviewed and discussed with the patient.    The patient's questions regarding discharge were answered and she is in agreement with the discharge plan.    Cameron Stringer M.D., FACOG

## 2024-09-25 ENCOUNTER — PROCEDURE VISIT (OUTPATIENT)
Dept: MATERNAL FETAL MEDICINE | Facility: CLINIC | Age: 29
End: 2024-09-25
Payer: MEDICAID

## 2024-09-25 ENCOUNTER — OFFICE VISIT (OUTPATIENT)
Dept: MATERNAL FETAL MEDICINE | Facility: CLINIC | Age: 29
End: 2024-09-25
Payer: MEDICAID

## 2024-09-25 ENCOUNTER — TELEPHONE (OUTPATIENT)
Dept: OBSTETRICS AND GYNECOLOGY | Facility: CLINIC | Age: 29
End: 2024-09-25
Payer: MEDICAID

## 2024-09-25 ENCOUNTER — PATIENT MESSAGE (OUTPATIENT)
Dept: OTHER | Facility: OTHER | Age: 29
End: 2024-09-25
Payer: MEDICAID

## 2024-09-25 VITALS
HEIGHT: 65 IN | DIASTOLIC BLOOD PRESSURE: 85 MMHG | SYSTOLIC BLOOD PRESSURE: 120 MMHG | BODY MASS INDEX: 32.32 KG/M2 | WEIGHT: 194 LBS | HEART RATE: 78 BPM

## 2024-09-25 DIAGNOSIS — Z36.89 ENCOUNTER FOR ULTRASOUND TO ASSESS FETAL GROWTH: ICD-10-CM

## 2024-09-25 DIAGNOSIS — O09.293 PRIOR POOR OBSTETRICAL HISTORY IN THIRD TRIMESTER, ANTEPARTUM: ICD-10-CM

## 2024-09-25 DIAGNOSIS — O09.293 PRIOR POOR OBSTETRICAL HISTORY IN THIRD TRIMESTER, ANTEPARTUM: Primary | ICD-10-CM

## 2024-09-25 DIAGNOSIS — O09.292 HISTORY OF PRE-ECLAMPSIA IN PRIOR PREGNANCY, CURRENTLY PREGNANT IN SECOND TRIMESTER: ICD-10-CM

## 2024-09-25 DIAGNOSIS — O09.90 SUPERVISION OF HIGH RISK PREGNANCY, ANTEPARTUM: Primary | ICD-10-CM

## 2024-09-25 DIAGNOSIS — O13.3 GESTATIONAL HYPERTENSION, THIRD TRIMESTER: ICD-10-CM

## 2024-09-25 PROCEDURE — 3008F BODY MASS INDEX DOCD: CPT | Mod: CPTII,,, | Performed by: STUDENT IN AN ORGANIZED HEALTH CARE EDUCATION/TRAINING PROGRAM

## 2024-09-25 PROCEDURE — 1159F MED LIST DOCD IN RCRD: CPT | Mod: CPTII,,, | Performed by: STUDENT IN AN ORGANIZED HEALTH CARE EDUCATION/TRAINING PROGRAM

## 2024-09-25 PROCEDURE — 1160F RVW MEDS BY RX/DR IN RCRD: CPT | Mod: CPTII,,, | Performed by: STUDENT IN AN ORGANIZED HEALTH CARE EDUCATION/TRAINING PROGRAM

## 2024-09-25 PROCEDURE — 3044F HG A1C LEVEL LT 7.0%: CPT | Mod: CPTII,,, | Performed by: STUDENT IN AN ORGANIZED HEALTH CARE EDUCATION/TRAINING PROGRAM

## 2024-09-25 PROCEDURE — 76816 OB US FOLLOW-UP PER FETUS: CPT | Mod: PBBFAC,PN | Performed by: STUDENT IN AN ORGANIZED HEALTH CARE EDUCATION/TRAINING PROGRAM

## 2024-09-25 PROCEDURE — 99214 OFFICE O/P EST MOD 30 MIN: CPT | Mod: S$PBB,TH,, | Performed by: STUDENT IN AN ORGANIZED HEALTH CARE EDUCATION/TRAINING PROGRAM

## 2024-09-25 PROCEDURE — 76819 FETAL BIOPHYS PROFIL W/O NST: CPT | Mod: 26,S$PBB,, | Performed by: STUDENT IN AN ORGANIZED HEALTH CARE EDUCATION/TRAINING PROGRAM

## 2024-09-25 PROCEDURE — 3074F SYST BP LT 130 MM HG: CPT | Mod: CPTII,,, | Performed by: STUDENT IN AN ORGANIZED HEALTH CARE EDUCATION/TRAINING PROGRAM

## 2024-09-25 PROCEDURE — 99999 PR PBB SHADOW E&M-EST. PATIENT-LVL III: CPT | Mod: PBBFAC,,, | Performed by: STUDENT IN AN ORGANIZED HEALTH CARE EDUCATION/TRAINING PROGRAM

## 2024-09-25 PROCEDURE — 3079F DIAST BP 80-89 MM HG: CPT | Mod: CPTII,,, | Performed by: STUDENT IN AN ORGANIZED HEALTH CARE EDUCATION/TRAINING PROGRAM

## 2024-09-25 PROCEDURE — 99213 OFFICE O/P EST LOW 20 MIN: CPT | Mod: PBBFAC,TH,PN | Performed by: STUDENT IN AN ORGANIZED HEALTH CARE EDUCATION/TRAINING PROGRAM

## 2024-09-25 RX ORDER — OMEPRAZOLE 20 MG/1
20 TABLET, DELAYED RELEASE ORAL DAILY
COMMUNITY

## 2024-09-25 NOTE — PROGRESS NOTES
"Maternal Fetal Medicine Follow up  SUBJECTIVE:     Vira Ybarra is a 29 y.o.  female with IUP at 32w0d who is seen for MFM follow up for management of:    Problem   Prior Poor Obstetrical History in Third Trimester, Antepartum     Previous notes reviewed.   No changes to medical, surgical, family, social, or obstetric history.    Review of patient's allergies indicates:   Allergen Reactions    Latex, natural rubber Anaphylaxis    Iodine and iodide containing products Rash     Care team members:  MD Siomara - Primary OB  OBJECTIVE:   Blood Pressure: /85 (BP Location: Left arm, Patient Position: Sitting)   Pulse 78   Ht 5' 5" (1.651 m)   Wt 88 kg (194 lb 0.1 oz)   LMP 2024   BMI 32.28 kg/m²   Ultrasound performed. See viewpoint for full ultrasound report.  Fetal size is AGA with the EFW at the 63% and the AC at the >99%. The EFW is 2467 g and majority of biometry measures 3 weeks ahead of gestational age.  A limited repeat fetal anatomic survey shows no abnormalities of the structures that were adequately imaged.  AFV is normal.   BPP   ASSESSMENT/PLAN:     29 y.o.  female with IUP at 32w0d presents for MFM follow up.    Prior poor obstetrical history in third trimester, antepartum  Prior stillbirth/IUFD:  Previously counseled- see initial note  AC > 99% with majority of biometry around 3 weeks ahead. She failed her 1 hr GTT and did not complete her 3hr.  She also reports large babies in her family. Her  was 13 lbs at birth  She is currently scheduled for RCD at 38w6d. Recommend delivery earlier in the 38th week and if concern is high by primary OB or patient anxiety, 37 week delivery is reasonable.    Summary of Recommendations:  Continue low dose aspirin 81 mg for preeclampsia risk reduction  Close surveillance for signs/symptoms of preeclampsia in second/third trimester and postpartum period  Serial fetal growth ultrasounds every 4-6 weeks, starting at 26-28 weeks- " scheduled  Fetal movement awareness, starting at 27-28 weeks  Initiate twice weekly antepartum surveillance at 32 weeks  Testing should be a weekly NST and weekly BPP. This has been scheduled with her OB  Delivery at 38 0/7 - 38 6/7 weeks gestation, unless indicated earlier by maternal or obstetric condition (e.g. diabetes, etc)  Delivery may be individualized and considered as early as 37 0/7 weeks, in patients with significant maternal anxiety who are well-counseled by MFM regarding the potential risks of early term delivery   Close monitoring for peripartum mood disorders      FOLLOW UP:   Repeat growth US in 4-6 weeks   Marvin Castillo  Maternal-Fetal Medicine    Electronically Signed by Marvin Castillo September 25, 2024

## 2024-09-25 NOTE — ASSESSMENT & PLAN NOTE
Prior stillbirth/IUFD:  Previously counseled- see initial note  AC > 99% with majority of biometry around 3 weeks ahead. She failed her 1 hr GTT and did not complete her 3hr.  She also reports large babies in her family. Her  was 13 lbs at birth  She is currently scheduled for RCD at 38w6d. Recommend delivery earlier in the 38th week and if concern is high by primary OB or patient anxiety, 37 week delivery is reasonable.    Summary of Recommendations:  Continue low dose aspirin 81 mg for preeclampsia risk reduction  Close surveillance for signs/symptoms of preeclampsia in second/third trimester and postpartum period  Serial fetal growth ultrasounds every 4-6 weeks, starting at 26-28 weeks- scheduled  Fetal movement awareness, starting at 27-28 weeks  Initiate twice weekly antepartum surveillance at 32 weeks  Testing should be a weekly NST and weekly BPP. This has been scheduled with her OB  Delivery at 38 0/7 - 38 6/7 weeks gestation, unless indicated earlier by maternal or obstetric condition (e.g. diabetes, etc)  Delivery may be individualized and considered as early as 37 0/7 weeks, in patients with significant maternal anxiety who are well-counseled by MFM regarding the potential risks of early term delivery   Close monitoring for peripartum mood disorders

## 2024-09-25 NOTE — TELEPHONE ENCOUNTER
----- Message from Mariano Galvez sent at 2024  1:41 PM CDT -----  Contact: Self  Type: Needs Medical Advice  Who Called:  Patient    Best Call Back Number: [phone]   Additional Information: Called to speak with office, states measuring ahead in US, wanted to know if  needed to be changed/wanted to verónica with Dr for . Please call

## 2024-09-26 ENCOUNTER — TELEPHONE (OUTPATIENT)
Dept: OBSTETRICS AND GYNECOLOGY | Facility: CLINIC | Age: 29
End: 2024-09-26
Payer: MEDICAID

## 2024-09-26 ENCOUNTER — ROUTINE PRENATAL (OUTPATIENT)
Dept: OBSTETRICS AND GYNECOLOGY | Facility: CLINIC | Age: 29
End: 2024-09-26
Payer: MEDICAID

## 2024-09-26 VITALS
SYSTOLIC BLOOD PRESSURE: 128 MMHG | WEIGHT: 196.88 LBS | HEART RATE: 108 BPM | BODY MASS INDEX: 32.76 KG/M2 | DIASTOLIC BLOOD PRESSURE: 90 MMHG

## 2024-09-26 DIAGNOSIS — Z3A.32 32 WEEKS GESTATION OF PREGNANCY: Primary | ICD-10-CM

## 2024-09-26 DIAGNOSIS — O36.63X1 EXCESSIVE FETAL GROWTH AFFECTING MANAGEMENT OF PREGNANCY IN THIRD TRIMESTER, FETUS 1 OF MULTIPLE GESTATION: ICD-10-CM

## 2024-09-26 DIAGNOSIS — O09.293 PRIOR POOR OBSTETRICAL HISTORY IN THIRD TRIMESTER, ANTEPARTUM: ICD-10-CM

## 2024-09-26 DIAGNOSIS — Z98.891 HISTORY OF 2 CESAREAN SECTIONS: ICD-10-CM

## 2024-09-26 PROCEDURE — 99213 OFFICE O/P EST LOW 20 MIN: CPT | Mod: PBBFAC,TH,PO | Performed by: OBSTETRICS & GYNECOLOGY

## 2024-09-26 PROCEDURE — 99999 PR PBB SHADOW E&M-EST. PATIENT-LVL III: CPT | Mod: PBBFAC,,, | Performed by: OBSTETRICS & GYNECOLOGY
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
## 2024-09-26 NOTE — TELEPHONE ENCOUNTER
Explained to pt that her ultrasound was not needed today since she had once completed yesterday and on Monday, however, she is still seeing provider today. Pt voiced understanding.

## 2024-09-26 NOTE — TELEPHONE ENCOUNTER
----- Message from Janki Espinoza sent at 9/26/2024 10:32 AM CDT -----  Contact: Self  Type: Needs Medical Advice  Who Called:  Patient  Best Call Back Number: [phone]    Additional Information: Pt is asking to speak to someone in the office regarding her appt that was cancelled for today. Can we please call pt back to discuss. Thank You.

## 2024-09-27 NOTE — PATIENT INSTRUCTIONS
To schedule classes (see below for what's offered) at the hospital, call [phone].    Have a question or concern?    PRO TIP: Program these phone numbers in your cell phone!    for an emergency  call 911 or go to the nearest hospital Especially after 20 weeks of the pregnancy, please remember that  Labor & Delivery is at [facility].  There is no L&D at [facility] ([facility]  After hours you can only access L&D through the Emergency Room (entrance on [facility]).   [facility] Nurse Care Advice Line  [phone] At any time during your pregnancy,  you can speak to a nurse 24-7.   for non-urgent issues, send us a  message in [employer] Consider calling the Nurse Care Advice Line if it's a weekend or  toward the end of the work-day since  [employer] and phone messages may not be answered for a day or two.   for non-urgent issues, call the clinic  [phone], Option 3    Labor & Delivery  [phone] Starting at 20 weeks of the pregnancy,  you can speak to a nurse on L&D 24-7.     THIRD TRIMESTER  29+0 - 42 weeks    Adapting to Pregnancy: Third Trimester   Some physical discomforts may seem worse in the final weeks of pregnancy. Simple lifestyle changes can help as you keep good posture and use good body mechanics.  Pay attention to your changing center of gravity.  Be kind to yourself and know your limits - your body is hosting an entire other human!  Ask for help if you need it.  Take fiber supplements, drink lots of water, and avoid prolonged sitting or standing to prevent constipation and hemorrhoids.  Be sure you're getting enough rest: avoid caffeine after 3pm, use a warm bath to relax before bedtime, ask for back/neck/shoulder massages from your partner, try drinking warm decaf tea or milk at bedtime, get heartburn under control.  Speaking of heartburnavoid spicy / acidic / sugary foods, especially in the evenings.  Eat slowly and in small amounts, and avoiding eating during the 2  hours before bedtime.  Try sleeping with your upper body elevated.    Your To-Do List  If you haven't already, get these important things done!  A few new tasks include having the carseat ready, having hospital bags packed, and making arrangements if needed for other children and/or pets while you're in the hospital.    We'll ask you to pre-register at the hospital around 36 weeks so you have a little less paperwork to do when the big day arrives.  You can walk-in at any time.  Go in the hospital's main entrance on [address] (near the pharmacy).  Walk in to Registration, which is across from the Information Desk.  You'll need your ID and insurance cards.    More information on these topics can be found in your OB Welcome Packet and the A-Z Book:  Choose a pediatrician for your baby.  Sign up for a tour and classes at the hospital.  All classes are free of charge if you are delivering at [facility].  Call [phone] to register for any of these classes:  Baby Love (learn about the delivery process and caring for a )  Big Brother / Big Sister Class (to help siblings prepare for baby)  Lamaze (a 4 week class to learn about natural interventions for labor)  Breastfeeding (get a head start learning about breastfeeding)  Work on some methods for coping with the pains of labor.  A good bit of labor happens before you can get an epidural, and you'll feel more confident if you have a plan that you've practiced.  We encourage everyone to breastfeed if they can (and most women can!).  Please ask us questions if you have them.  Decide what you'd like to use for contraception (birth control) after this baby is born.  If you're having a boy, let us know if you plan to have him circumcised.    Things to Look Out For  The Four Questions (please read more about these in your OB Welcome Packet): 1) Baby's Movements, 2) Contractions / Cramping, 3) Vaginal bleeding, 4) Leaking fluids  Mood swings and depression - some mood swings are  expected in pregnancy, but please ask for help if you are feeling overwhelmed or sad all the time.  Headaches that don't improve with rest, drinking water, and tylenol.  Severe swelling, especially of the face and hands. Remember some swelling of the lower legs is normal, especially if you have been on your feet for some time.    Intimacy   Unless your doctor tells you not to, it's still fine to continue having sex. The third trimester though can be a challenge comfort-wise. Try different positions and see what's best for you.    Baby!  Baby kicks and stretches despite running low on room, lanugo disappears, baby gains about a half of a pound per week, and bones harden (except for the skull, which remains soft and flexible for delivery).    OTHER INFORMATION:  If your provider orders labs or other studies, you probably won't hear from us unless something is abnormal.  How we define normal is different for many things in pregnancy.  This includes several of the numbers on a Complete Blood Count (CBC).  If your result is flagged as abnormal in MyChart but you don't hear from us, it's probably because things are actually normal based on where you are in your pregnancy.

## 2024-09-30 DIAGNOSIS — Z98.891 PREVIOUS CESAREAN SECTION: Primary | ICD-10-CM

## 2024-10-08 ENCOUNTER — ROUTINE PRENATAL (OUTPATIENT)
Dept: OBSTETRICS AND GYNECOLOGY | Facility: CLINIC | Age: 29
End: 2024-10-08
Payer: MEDICAID

## 2024-10-08 VITALS
WEIGHT: 198.75 LBS | BODY MASS INDEX: 33.07 KG/M2 | HEART RATE: 66 BPM | DIASTOLIC BLOOD PRESSURE: 88 MMHG | SYSTOLIC BLOOD PRESSURE: 128 MMHG

## 2024-10-08 DIAGNOSIS — Z98.891 PREVIOUS CESAREAN SECTION: ICD-10-CM

## 2024-10-08 DIAGNOSIS — O09.293 PRIOR POOR OBSTETRICAL HISTORY IN THIRD TRIMESTER, ANTEPARTUM: ICD-10-CM

## 2024-10-08 DIAGNOSIS — Z98.891 HISTORY OF 2 CESAREAN SECTIONS: ICD-10-CM

## 2024-10-08 DIAGNOSIS — Z3A.33 33 WEEKS GESTATION OF PREGNANCY: Primary | ICD-10-CM

## 2024-10-08 DIAGNOSIS — O09.292 HISTORY OF PRE-ECLAMPSIA IN PRIOR PREGNANCY, CURRENTLY PREGNANT IN SECOND TRIMESTER: ICD-10-CM

## 2024-10-08 PROCEDURE — 59025 FETAL NON-STRESS TEST: CPT | Mod: PBBFAC,PO | Performed by: OBSTETRICS & GYNECOLOGY

## 2024-10-08 PROCEDURE — 99212 OFFICE O/P EST SF 10 MIN: CPT | Mod: PBBFAC,TH,PO | Performed by: OBSTETRICS & GYNECOLOGY

## 2024-10-08 PROCEDURE — 99999 PR PBB SHADOW E&M-EST. PATIENT-LVL II: CPT | Mod: PBBFAC,,, | Performed by: OBSTETRICS & GYNECOLOGY
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
## 2024-10-10 NOTE — PATIENT INSTRUCTIONS
To schedule classes (see below for what's offered) at the hospital, call [phone].    Have a question or concern?    PRO TIP: Program these phone numbers in your cell phone!    for an emergency  call 911 or go to the nearest hospital Especially after 20 weeks of the pregnancy, please remember that  Labor & Delivery is at [facility].  There is no L&D at [facility]  After hours you can only access L&D through the Emergency Room (entrance on [facility]).   [facility] Nurse Care Advice Line  [phone] At any time during your pregnancy,  you can speak to a nurse 24-7.   for non-urgent issues, send us a  message in Ufree Consider calling the Nurse Care Advice Line if it's a weekend or  toward the end of the work-day since  [employer] and phone messages may not be answered for a day or two.   for non-urgent issues, call the clinic  [phone], Option 3    Labor & Delivery  [phone] Starting at 20 weeks of the pregnancy,  you can speak to a nurse on L&D 24-7.     THIRD TRIMESTER  29+0 - 42 weeks    Adapting to Pregnancy: Third Trimester   Some physical discomforts may seem worse in the final weeks of pregnancy. Simple lifestyle changes can help as you keep good posture and use good body mechanics.  Pay attention to your changing center of gravity.  Be kind to yourself and know your limits - your body is hosting an entire other human!  Ask for help if you need it.  Take fiber supplements, drink lots of water, and avoid prolonged sitting or standing to prevent constipation and hemorrhoids.  Be sure you're getting enough rest: avoid caffeine after 3pm, use a warm bath to relax before bedtime, ask for back/neck/shoulder massages from your partner, try drinking warm decaf tea or milk at bedtime, get heartburn under control.  Speaking of heartburnavoid spicy / acidic / sugary foods, especially in the evenings.  Eat slowly and in small amounts, and avoiding eating during the 2  hours before bedtime.  Try sleeping with your upper body elevated.    Your To-Do List  If you haven't already, get these important things done!  A few new tasks include having the carseat ready, having hospital bags packed, and making arrangements if needed for other children and/or pets while you're in the hospital.    We'll ask you to pre-register at the hospital around 36 weeks so you have a little less paperwork to do when the big day arrives.  You can walk-in at any time.  Go in the hospital's main entrance on [address] (near the pharmacy).  Walk in to Registration, which is across from the Information Desk.  You'll need your ID and insurance cards.    More information on these topics can be found in your OB Welcome Packet and the A-Z Book:  Choose a pediatrician for your baby.  Sign up for a tour and classes at the hospital.  All classes are free of charge if you are delivering at [facility].  Call [phone] to register for any of these classes:  Baby Love (learn about the delivery process and caring for a )  Big Brother / Big Sister Class (to help siblings prepare for baby)  Lamaze (a 4 week class to learn about natural interventions for labor)  Breastfeeding (get a head start learning about breastfeeding)  Work on some methods for coping with the pains of labor.  A good bit of labor happens before you can get an epidural, and you'll feel more confident if you have a plan that you've practiced.  We encourage everyone to breastfeed if they can (and most women can!).  Please ask us questions if you have them.  Decide what you'd like to use for contraception (birth control) after this baby is born.  If you're having a boy, let us know if you plan to have him circumcised.    Things to Look Out For  The Four Questions (please read more about these in your OB Welcome Packet): 1) Baby's Movements, 2) Contractions / Cramping, 3) Vaginal bleeding, 4) Leaking fluids  Mood swings and depression - some mood swings are  expected in pregnancy, but please ask for help if you are feeling overwhelmed or sad all the time.  Headaches that don't improve with rest, drinking water, and tylenol.  Severe swelling, especially of the face and hands. Remember some swelling of the lower legs is normal, especially if you have been on your feet for some time.    Intimacy   Unless your doctor tells you not to, it's still fine to continue having sex. The third trimester though can be a challenge comfort-wise. Try different positions and see what's best for you.    Baby!  Baby kicks and stretches despite running low on room, lanugo disappears, baby gains about a half of a pound per week, and bones harden (except for the skull, which remains soft and flexible for delivery).    OTHER INFORMATION:  If your provider orders labs or other studies, you probably won't hear from us unless something is abnormal.  How we define normal is different for many things in pregnancy.  This includes several of the numbers on a Complete Blood Count (CBC).  If your result is flagged as abnormal in MyChart but you don't hear from us, it's probably because things are actually normal based on where you are in your pregnancy.

## 2024-10-15 ENCOUNTER — ROUTINE PRENATAL (OUTPATIENT)
Dept: OBSTETRICS AND GYNECOLOGY | Facility: CLINIC | Age: 29
End: 2024-10-15
Payer: MEDICAID

## 2024-10-15 ENCOUNTER — PATIENT MESSAGE (OUTPATIENT)
Dept: OBSTETRICS AND GYNECOLOGY | Facility: CLINIC | Age: 29
End: 2024-10-15
Payer: MEDICAID

## 2024-10-15 VITALS
HEART RATE: 71 BPM | WEIGHT: 201.38 LBS | DIASTOLIC BLOOD PRESSURE: 70 MMHG | SYSTOLIC BLOOD PRESSURE: 122 MMHG | BODY MASS INDEX: 33.51 KG/M2

## 2024-10-15 DIAGNOSIS — Z3A.34 34 WEEKS GESTATION OF PREGNANCY: Primary | ICD-10-CM

## 2024-10-15 DIAGNOSIS — O09.293 PRIOR POOR OBSTETRICAL HISTORY IN THIRD TRIMESTER, ANTEPARTUM: ICD-10-CM

## 2024-10-15 DIAGNOSIS — Z98.891 HISTORY OF 2 CESAREAN SECTIONS: ICD-10-CM

## 2024-10-15 DIAGNOSIS — O09.292 HISTORY OF PRE-ECLAMPSIA IN PRIOR PREGNANCY, CURRENTLY PREGNANT IN SECOND TRIMESTER: ICD-10-CM

## 2024-10-15 PROCEDURE — 99999 PR PBB SHADOW E&M-EST. PATIENT-LVL III: CPT | Mod: PBBFAC,,, | Performed by: OBSTETRICS & GYNECOLOGY

## 2024-10-15 PROCEDURE — 59025 FETAL NON-STRESS TEST: CPT | Mod: PBBFAC,PO | Performed by: OBSTETRICS & GYNECOLOGY

## 2024-10-15 PROCEDURE — 99213 OFFICE O/P EST LOW 20 MIN: CPT | Mod: PBBFAC,TH,PO,25 | Performed by: OBSTETRICS & GYNECOLOGY
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
## 2024-10-16 ENCOUNTER — HOSPITAL ENCOUNTER (INPATIENT)
Facility: HOSPITAL | Age: 29
LOS: 5 days | Discharge: HOME | DRG: 833 | End: 2024-10-21
Attending: OBSTETRICS & GYNECOLOGY | Admitting: OBSTETRICS & GYNECOLOGY
Payer: MEDICAID

## 2024-10-16 ENCOUNTER — PATIENT MESSAGE (OUTPATIENT)
Dept: OTHER | Facility: OTHER | Age: 29
End: 2024-10-16
Payer: MEDICAID

## 2024-10-16 DIAGNOSIS — O09.292 HISTORY OF PRE-ECLAMPSIA IN PRIOR PREGNANCY, CURRENTLY PREGNANT IN SECOND TRIMESTER: Primary | ICD-10-CM

## 2024-10-16 DIAGNOSIS — O09.293 PRIOR POOR OBSTETRICAL HISTORY IN THIRD TRIMESTER, ANTEPARTUM: ICD-10-CM

## 2024-10-16 DIAGNOSIS — R07.89 CHEST PRESSURE: ICD-10-CM

## 2024-10-16 DIAGNOSIS — O46.93 THIRD TRIMESTER BLEEDING, ANTEPARTUM: ICD-10-CM

## 2024-10-16 LAB
ABO + RH BLD: NORMAL
BASOPHILS # BLD AUTO: 0.03 K/UL (ref 0–0.2)
BASOPHILS NFR BLD: 0.3 % (ref 0–1.9)
BLD GP AB SCN CELLS X3 SERPL QL: NORMAL
DIFFERENTIAL METHOD BLD: ABNORMAL
EOSINOPHIL # BLD AUTO: 0.1 K/UL (ref 0–0.5)
EOSINOPHIL NFR BLD: 0.9 % (ref 0–8)
ERYTHROCYTE [DISTWIDTH] IN BLOOD BY AUTOMATED COUNT: 14 % (ref 11.5–14.5)
FIBRINOGEN PPP-MCNC: 426 MG/DL (ref 182–400)
HCT VFR BLD AUTO: 36 % (ref 37–48.5)
HGB BLD-MCNC: 11.5 G/DL (ref 12–16)
IMM GRANULOCYTES # BLD AUTO: 0.04 K/UL (ref 0–0.04)
IMM GRANULOCYTES NFR BLD AUTO: 0.5 % (ref 0–0.5)
LYMPHOCYTES # BLD AUTO: 1.7 K/UL (ref 1–4.8)
LYMPHOCYTES NFR BLD: 19.7 % (ref 18–48)
MCH RBC QN AUTO: 27.4 PG (ref 27–31)
MCHC RBC AUTO-ENTMCNC: 31.9 G/DL (ref 32–36)
MCV RBC AUTO: 86 FL (ref 82–98)
MONOCYTES # BLD AUTO: 0.5 K/UL (ref 0.3–1)
MONOCYTES NFR BLD: 6 % (ref 4–15)
NEUTROPHILS # BLD AUTO: 6.3 K/UL (ref 1.8–7.7)
NEUTROPHILS NFR BLD: 72.6 % (ref 38–73)
NRBC BLD-RTO: 0 /100 WBC
PLATELET # BLD AUTO: 173 K/UL (ref 150–450)
PMV BLD AUTO: 10.5 FL (ref 9.2–12.9)
RBC # BLD AUTO: 4.19 M/UL (ref 4–5.4)
SPECIMEN OUTDATE: NORMAL
WBC # BLD AUTO: 8.69 K/UL (ref 3.9–12.7)

## 2024-10-16 PROCEDURE — 99223 1ST HOSP IP/OBS HIGH 75: CPT | Mod: ,,, | Performed by: GENERAL PRACTICE

## 2024-10-16 PROCEDURE — 96372 THER/PROPH/DIAG INJ SC/IM: CPT | Performed by: GENERAL PRACTICE

## 2024-10-16 PROCEDURE — 85025 COMPLETE CBC W/AUTO DIFF WBC: CPT | Performed by: OBSTETRICS & GYNECOLOGY

## 2024-10-16 PROCEDURE — G0378 HOSPITAL OBSERVATION PER HR: HCPCS

## 2024-10-16 PROCEDURE — 25000003 PHARM REV CODE 250: Performed by: GENERAL PRACTICE

## 2024-10-16 PROCEDURE — 12000002 HC ACUTE/MED SURGE SEMI-PRIVATE ROOM

## 2024-10-16 PROCEDURE — 85384 FIBRINOGEN ACTIVITY: CPT | Performed by: OBSTETRICS & GYNECOLOGY

## 2024-10-16 PROCEDURE — 63600175 PHARM REV CODE 636 W HCPCS: Performed by: GENERAL PRACTICE

## 2024-10-16 PROCEDURE — 86850 RBC ANTIBODY SCREEN: CPT | Performed by: OBSTETRICS & GYNECOLOGY

## 2024-10-16 PROCEDURE — 36415 COLL VENOUS BLD VENIPUNCTURE: CPT | Performed by: OBSTETRICS & GYNECOLOGY

## 2024-10-16 RX ORDER — PRENATAL WITH FERROUS FUM AND FOLIC ACID 3080; 920; 120; 400; 22; 1.84; 3; 20; 10; 1; 12; 200; 27; 25; 2 [IU]/1; [IU]/1; MG/1; [IU]/1; MG/1; MG/1; MG/1; MG/1; MG/1; MG/1; UG/1; MG/1; MG/1; MG/1; MG/1
1 TABLET ORAL DAILY
Status: DISCONTINUED | OUTPATIENT
Start: 2024-10-16 | End: 2024-10-21 | Stop reason: HOSPADM

## 2024-10-16 RX ORDER — PANTOPRAZOLE SODIUM 40 MG/1
40 TABLET, DELAYED RELEASE ORAL DAILY
Status: DISCONTINUED | OUTPATIENT
Start: 2024-10-16 | End: 2024-10-21 | Stop reason: HOSPADM

## 2024-10-16 RX ORDER — AMOXICILLIN 250 MG
1 CAPSULE ORAL NIGHTLY PRN
Status: DISCONTINUED | OUTPATIENT
Start: 2024-10-16 | End: 2024-10-21 | Stop reason: HOSPADM

## 2024-10-16 RX ORDER — SODIUM CHLORIDE 0.9 % (FLUSH) 0.9 %
10 SYRINGE (ML) INJECTION
Status: DISCONTINUED | OUTPATIENT
Start: 2024-10-16 | End: 2024-10-21 | Stop reason: HOSPADM

## 2024-10-16 RX ORDER — BUSPIRONE HYDROCHLORIDE 5 MG/1
5 TABLET ORAL NIGHTLY
Status: DISCONTINUED | OUTPATIENT
Start: 2024-10-16 | End: 2024-10-20

## 2024-10-16 RX ORDER — PANTOPRAZOLE SODIUM 40 MG/1
40 TABLET, DELAYED RELEASE ORAL
Status: DISCONTINUED | OUTPATIENT
Start: 2024-10-17 | End: 2024-10-16

## 2024-10-16 RX ORDER — DIPHENHYDRAMINE HCL 25 MG
25 CAPSULE ORAL EVERY 4 HOURS PRN
Status: DISCONTINUED | OUTPATIENT
Start: 2024-10-16 | End: 2024-10-21 | Stop reason: HOSPADM

## 2024-10-16 RX ORDER — SIMETHICONE 80 MG
1 TABLET,CHEWABLE ORAL EVERY 6 HOURS PRN
Status: DISCONTINUED | OUTPATIENT
Start: 2024-10-16 | End: 2024-10-21 | Stop reason: HOSPADM

## 2024-10-16 RX ORDER — ACETAMINOPHEN 325 MG/1
650 TABLET ORAL EVERY 6 HOURS PRN
Status: DISCONTINUED | OUTPATIENT
Start: 2024-10-16 | End: 2024-10-17

## 2024-10-16 RX ORDER — ONDANSETRON 4 MG/1
8 TABLET, ORALLY DISINTEGRATING ORAL EVERY 8 HOURS PRN
Status: DISCONTINUED | OUTPATIENT
Start: 2024-10-16 | End: 2024-10-21 | Stop reason: HOSPADM

## 2024-10-16 RX ORDER — BETAMETHASONE SODIUM PHOSPHATE AND BETAMETHASONE ACETATE 3; 3 MG/ML; MG/ML
12 INJECTION, SUSPENSION INTRA-ARTICULAR; INTRALESIONAL; INTRAMUSCULAR; SOFT TISSUE EVERY 24 HOURS
Status: DISPENSED | OUTPATIENT
Start: 2024-10-16 | End: 2024-10-18

## 2024-10-16 RX ORDER — ESCITALOPRAM OXALATE 10 MG/1
20 TABLET ORAL NIGHTLY
Status: DISCONTINUED | OUTPATIENT
Start: 2024-10-16 | End: 2024-10-21 | Stop reason: HOSPADM

## 2024-10-16 RX ADMIN — BUSPIRONE HYDROCHLORIDE 5 MG: 5 TABLET ORAL at 10:10

## 2024-10-16 RX ADMIN — PRENATAL VIT W/ FE FUMARATE-FA TAB 27-0.8 MG 1 TABLET: 27-0.8 TAB at 10:10

## 2024-10-16 RX ADMIN — PANTOPRAZOLE SODIUM 40 MG: 40 TABLET, DELAYED RELEASE ORAL at 10:10

## 2024-10-16 RX ADMIN — BETAMETHASONE ACETATE AND BETAMETHASONE SODIUM PHOSPHATE 12 MG: 3; 3 INJECTION, SUSPENSION INTRA-ARTICULAR; INTRALESIONAL; INTRAMUSCULAR; SOFT TISSUE at 06:10

## 2024-10-16 RX ADMIN — ESCITALOPRAM OXALATE 20 MG: 10 TABLET ORAL at 10:10

## 2024-10-16 NOTE — H&P
HISTORY AND PHYSICAL  10/16/2024    SUBJECTIVE   Vira Ybarra is a 29 y.o.  yo  at 35w0d who presented to L&D this evening with complaint of vaginal bleeding.  Describes thin pink bloody discharge at first then more of a gush of bright red blood.  Not sure if amniotic fluid was associated.  Was ~1730hrs.  Only felt one contraction since being here.  Active FM of baby boy.    Patient's pad has a ~3 inch area saturated with blood.    Denies any recent sex or anything else in the vagina.  No abdominal trauma or falls.  No dysuria.  No vaginal discharge / itching / burning.  No fevers.  No URI, rash, or GI illness.      Current Outpatient Medications   Medication Instructions    aspirin (ECOTRIN) 81 mg, Oral, Daily    azelastine (ASTELIN) 137 mcg (0.1 %) nasal spray SMARTSIG:Both Nares    busPIRone (BUSPAR) 5 mg, Oral, 2 times daily PRN, As needed for anxiety    EScitalopram oxalate (LEXAPRO) 20 mg, 2 times daily    ferrous sulfate (FEOSOL) 325 mg, Oral, Four times weekly    omeprazole (PRILOSEC OTC) 20 mg, Daily    ondansetron (ZOFRAN-ODT) 4 mg, Oral, Every 6 hours PRN    prenatal vit no.130-iron-folic (PRENATAL VITAMIN) 27 mg iron- 800 mcg Tab 1 tablet, Oral, Daily       OBJECTIVE   Vitals:    10/16/24 1517   BP: (!) 134/96   Temp: 98.3 °F (36.8 °C)     Vitals:    10/16/24 1917   BP: 135/88   Pulse: 108   Temp:      GEN = alert/oriented, nad, pleasant and cheerful,  present and supportive  HEENT = sclera anicteric, EOM grossly normal  BREASTS = deferred, no concerns  CV = BP and HR as per vitals  PULM = normal respiratory effort  ABD = soft, gravid, nontender, nondistended   =     External: nefg, no lesions, dried blood noted on vulva, towel between patient's legs was soaked with a pale yellow fluid and smears of blood, smell c/w urine     Vagina: normal and without lesions and urethral meatus normal, no injuries     Discharge: odorless and bloody, quarter-sized blood clot removed      Cervix: visually thick and closed, no pooling with valsalva, no injuries    NEGATIVE ferning on microscopy    ROM-plus was positive (but q-tip was saturated with blood)    FHR: 125bpm baseline, moderate variability, spont accels, no decels  TOCO: occasional possible CTX    OB ULTRASOUND: DATE: 10/16/2024  Single live intrauterine fetus in cephalic position, with biophysical profile score of 8 out of 8.  Placenta is posterior and fundal, grade 1, with no retroplacental fluid collections. Amniotic fluid is adequate, with UYEN of 16.8 cm.     Lab Results   Component Value Date    GROUPTRH A POS 10/16/2024    INDIRECTCOOM NEG 10/16/2024     Lab Results   Component Value Date    WBC 8.69 10/16/2024    HGB 11.5 (L) 10/16/2024    HCT 36.0 (L) 10/16/2024     10/16/2024    MCV 86 10/16/2024    FIBRINOGEN 426 (H) 10/16/2024       ASSESSMENT / PLAN  29 y.o.  at 35w0d with moderate vaginal bleeding with no clear etiology.  Do not think patient had ROM given physical exam, negative ferning, and normal UYEN.  Labs, ultrasound, and lack of a mechanism reassuring re no placental abruption.  One mild-range BP noted with history of 26wk severe pre-e / delivery.    Admit for observation  Will start steroid series for FLM in case pre-term delivery is indicated (in any event plan is for delivery at 37wks)  NICU to  patient  Continuous EFM/TOCO  Consider repeat labs (CBC, fibrinogen) in the morning  May have regular dinner then clears after MN    Susan Geiger MD   OB PROBLEM LIST:    History of term stillborn (suspect cord accident)    [  ] APFT 2/week @ 32wks    [  ] serial growth US @ 26-28wks    [  ] RCS scheduled 30 OCT @ 37+0wks    History of  preeclampsia with delivery at 26wks, baseline labs wnl, APLAS labs wnl, on ASA 81mg    History of  section x 2    Depression/anxiety: Currently on Lexapro and BuSpar    Varicella non-immune   FINAL EDC:    2024 by US done 2024      SO Name:  Sushil Ybarra ANATOMY SCAN:  DATE: 6/26/24 @ 19+0wks:  SIZE = DATES  ANATOMY: wnl but incomplete  PLACENTA: posterior  CERVIX: normal length    DATE: 7/25/24 @ 23+1wks:  SIZE = DATES  ANATOMY: wnl but remains incomplete (feet)      INITIAL LABS:  Date: 5/3/2024    CBC: 7.2 > 13.9 / 41.6 < 213    Lab Results   Component Value Date    GROUPTRH A POS 10/16/2024    INDIRECTCOOM NEG 10/16/2024     Lab Results   Component Value Date    HEPBSAG Non-reactive 05/03/2024    HEPCAB Non-reactive 05/03/2024    TREPABIGMIGG Nonreactive 08/29/2024    MZA52NIHB Non-reactive 05/03/2024    RUBELLAIMMUN Reactive 05/03/2024     Lab Results   Component Value Date    VARICELLAZOS 112.60 05/03/2024    VARICELLAINT Negative 05/03/2024     Lab Results   Component Value Date    HGBELECTINTE Normal 05/03/2024     Lab Results   Component Value Date    LABURIN No growth 07/11/2024     Lab Results   Component Value Date    CREATININE 0.6 09/17/2024    AST 12 09/17/2024    ALT 12 09/17/2024    BILITOT 0.3 09/17/2024    UTPCR 0.15 09/17/2024      Lab Results   Component Value Date    HGBA1C 4.5 06/27/2024     LAST PAP: PAP neg / Date: DEC 2023     10-12 WEEK LABS:    PAP: PAP neg / Date:  12/29/2023    Lab Results   Component Value Date    LABCHLA Not Detected 04/01/2024    LABNGO Not Detected 04/01/2024       cfDNA ([id number]):  Low risk.  XY (MAY 2024)    CARRIER SCREEN (Inheritest Core): possibly not done    6/27/24:  Lupus anticoagulant not detected.   This panel did not detect evidence for heparin, direct thrombin inhibitors, or direct Xa inhibitors and drug neutralization was not performed.    Cardiolipin Abs neg    Beta-2 Glycoprotein Abs neg 28 WEEK LABS:    CBC:   1Hr OGTT:  Ferritin:      OTHER LABS:  GBS: ___   OTHER ULTRASOUNDS:  GROWTH US (9/25/24 @ 32+0wks):  EFW 2467g = 63 percentile, cephalic presentation, (5lb 7oz), BPP 8/8, Fetal size is AGA with the EFW at the 63% and the AC at the >99%. The EFW is 2467 g and majority of  biometry measures 3 weeks ahead of gestational age.    TO-DO THROUGHOUT PREGNANCY:    Depression Screen (20wks):  2024  TDAP (27-36wks):  2024    Plans to breastfeed: ___    Postpartum contraception: ___  Consent for delivery: 2024  Circumcision consent: 2024  BTL counseling: ___   MEDICATIONS:    Prenatal vitamins   Vitamin B6 ALLERGIES:    Iodine (rash in wheezing)    MEDICAL HISTORY:    History gestational hypertension  Asthma   Depression/anxiety/PTSD    Pre-pregnancy BMI = 32 SURGICAL HISTORY:     section   Dilation and curettage    SOCIAL HISTORY:    Smoker: non-smoker  Alcohol: yes but not since +HCG  Drugs: denies  Relationship:  3/21/2024  Domestic Violence: no  Lives with:    Education Level: Some College  Occupation: homemaker  Baptism:  Christianity  Other:   GYN HISTORY:    PAP'S: Reported history of abnormal Pap smear but abnormality not recalled  STI'S: no past history  GENITAL HSV: no FAMILY HISTORY:    HTN: Yes - mother and father  DIABETES: Yes - father  BLEEDING D/O: Yes - mother  CLOTTING D/O: Yes - father  BIRTH DEFECTS: No  MENTAL DISABILITY: No  TWINS OR MORE: No  GYN CANCER: Yes - mother  Other:     OBSTETRIC HISTORY   Month/Year Mode of Delivery EGA Wt. 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  No history of diabetes   2017  section 40  Female Failed induction of labor   2015  section 26  Female Preeclampsia        First-trimester SAB x4

## 2024-10-16 NOTE — NURSING
[facility]  Department of Obstetrics and Gynecology  Labor & Delivery Triage Assessment    PATIENT NAME: Vira Ybarra  MRN: [id number]  TODAY'S DATE: 10/16/2024    CHIEF COMPLAINT: Vaginal Bleeding      OB History    Para Term  AB Living   5 3 2 1 1 2   SAB IAB Ectopic Multiple Live Births   1 0 0 0 2      # Outcome Date GA Lbr Enrique/2nd Weight Sex Type Anes PTL Lv   5 Current            4 SAB 10/2023           3  19 26w0d  0.539 kg (1 lb 3 oz) F CS-Unspec   GILBERT      Complications: Pre-eclampsia   2 Term 17   5.925 kg (13 lb 1 oz) F CS-Unspec   GILBERT   1 Term  41w0d   M Vag-Spont   FD     Past Medical History:   Diagnosis Date    Abnormal Pap smear of cervix     Anemia     Anxiety disorder, unspecified     Depression      Past Surgical History:   Procedure Laterality Date     SECTION      x2         VITAL SIGNS - ABNORMAL VITALS INCLUDE TEMP >100.4,RR <12 or >26, SUSTAINED MATERNAL PULSE <60 or >120     VITAL SIGNS (Most Recent)       VITAL SIGNS     normal  HEADACHE    no     VOMITING    no  VISUAL DISTURBANCES  no  EPIGASTRIC PAIN        no  PROTEINURIA 2+ or MORE             Not sent    EDEMA FACE/EXTREMITIES            no    FETAL MOVEMENT     FETAL MOVEMENT: decreased  FETAL HEART RATE BASELINE =  145  normal  FETAL HEART RATE VARIABILITY:  Moderate  FETAL HEART RATE ACCELERATIONS FOR GESTATIONAL AGE: present  FETAL HEART RATE DECELERATIONS: none    ABDOMINAL PAIN/CRAMPING/CONTRACTIONS     Patient is not complaining of abdominal pain/cramping/contractions.    RUPTURE OF MEMBRANES OR LEAKING OF AMNIOTIC FLUID     Patient reports leaking of amniotic fluid and is pink/red in color and reporting amount as moderate.   ROM plus collected is Negative. GBS status is Unknown.  ROM+ positive but was saturated with blood. MD aware. Ferning completed by MD negative.  VAGINAL BLEEDING     Patient reports vaginal bleeding. Vaginal bleeding was present on  arrival. Vaginal bleeding started 45 min ago and the patient reports the amount as moderate.    VAGINAL EXAM     DILATION:  na  STATION:  na  EFFACEMENT:  na  PRESENTATION:  na    VAGINAL EXAM DEFERRED DUE TO:  Vaginal Bleeding    PAIN PRESENT ON ARRIVAL     ONSET:   45 min ago  LOCATION:  Abd/vaginal pressure  PAIN SCALE (0-10):  3  DESCRIPTION: pressure    Interventions     ROM+  U/S  CBC   T&S  Spec exam   PATIENT DISPOSITION     Report given to       Dr. Stringer notified at 1542 of the above assessment.    Elizabeth Kern, RN  [facility]  10/16/2024

## 2024-10-17 LAB
ALBUMIN SERPL BCP-MCNC: 3.6 G/DL (ref 3.5–5.2)
ALP SERPL-CCNC: 136 U/L (ref 55–135)
ALT SERPL W/O P-5'-P-CCNC: 9 U/L (ref 10–44)
ANION GAP SERPL CALC-SCNC: 7 MMOL/L (ref 8–16)
AST SERPL-CCNC: 11 U/L (ref 10–40)
BACTERIA #/AREA URNS HPF: NORMAL /HPF
BASOPHILS # BLD AUTO: 0.02 K/UL (ref 0–0.2)
BASOPHILS NFR BLD: 0.2 % (ref 0–1.9)
BILIRUB SERPL-MCNC: 0.5 MG/DL (ref 0.1–1)
BILIRUB UR QL STRIP: NEGATIVE
BUN SERPL-MCNC: 6 MG/DL (ref 6–20)
CALCIUM SERPL-MCNC: 9.3 MG/DL (ref 8.7–10.5)
CHLORIDE SERPL-SCNC: 108 MMOL/L (ref 95–110)
CLARITY UR: CLEAR
CO2 SERPL-SCNC: 22 MMOL/L (ref 23–29)
COLOR UR: COLORLESS
CREAT SERPL-MCNC: 0.5 MG/DL (ref 0.5–1.4)
CREAT UR-MCNC: 31.1 MG/DL (ref 15–325)
DIFFERENTIAL METHOD BLD: ABNORMAL
EOSINOPHIL # BLD AUTO: 0 K/UL (ref 0–0.5)
EOSINOPHIL NFR BLD: 0 % (ref 0–8)
ERYTHROCYTE [DISTWIDTH] IN BLOOD BY AUTOMATED COUNT: 13.7 % (ref 11.5–14.5)
EST. GFR  (NO RACE VARIABLE): >60 ML/MIN/1.73 M^2
FIBRINOGEN PPP-MCNC: 473 MG/DL (ref 182–400)
GLUCOSE SERPL-MCNC: 120 MG/DL (ref 70–110)
GLUCOSE UR QL STRIP: ABNORMAL
HCT VFR BLD AUTO: 37.4 % (ref 37–48.5)
HGB BLD-MCNC: 12 G/DL (ref 12–16)
HGB UR QL STRIP: ABNORMAL
IMM GRANULOCYTES # BLD AUTO: 0.07 K/UL (ref 0–0.04)
IMM GRANULOCYTES NFR BLD AUTO: 0.6 % (ref 0–0.5)
KETONES UR QL STRIP: ABNORMAL
LEUKOCYTE ESTERASE UR QL STRIP: NEGATIVE
LYMPHOCYTES # BLD AUTO: 1.1 K/UL (ref 1–4.8)
LYMPHOCYTES NFR BLD: 9.6 % (ref 18–48)
MCH RBC QN AUTO: 27.8 PG (ref 27–31)
MCHC RBC AUTO-ENTMCNC: 32.1 G/DL (ref 32–36)
MCV RBC AUTO: 87 FL (ref 82–98)
MICROSCOPIC COMMENT: NORMAL
MICROSCOPIC COMMENT: NORMAL
MONOCYTES # BLD AUTO: 0.3 K/UL (ref 0.3–1)
MONOCYTES NFR BLD: 3 % (ref 4–15)
NEUTROPHILS # BLD AUTO: 9.9 K/UL (ref 1.8–7.7)
NEUTROPHILS NFR BLD: 86.6 % (ref 38–73)
NITRITE UR QL STRIP: NEGATIVE
NRBC BLD-RTO: 0 /100 WBC
PH UR STRIP: 7 [PH] (ref 5–8)
PLATELET # BLD AUTO: 168 K/UL (ref 150–450)
PMV BLD AUTO: 10.4 FL (ref 9.2–12.9)
POTASSIUM SERPL-SCNC: 4.6 MMOL/L (ref 3.5–5.1)
PROT SERPL-MCNC: 6.5 G/DL (ref 6–8.4)
PROT UR QL STRIP: NEGATIVE
PROT UR-MCNC: 5 MG/DL (ref 6–15)
PROT/CREAT UR: 0.16 MG/G{CREAT} (ref 0–0.2)
RBC # BLD AUTO: 4.32 M/UL (ref 4–5.4)
RBC #/AREA URNS HPF: 0 /HPF (ref 0–4)
RBC #/AREA URNS HPF: 0 /HPF (ref 0–4)
SODIUM SERPL-SCNC: 137 MMOL/L (ref 136–145)
SP GR UR STRIP: 1.01 (ref 1–1.03)
SQUAMOUS #/AREA URNS HPF: 0 /HPF
SQUAMOUS #/AREA URNS HPF: 0 /HPF
URN SPEC COLLECT METH UR: ABNORMAL
UROBILINOGEN UR STRIP-ACNC: NEGATIVE EU/DL
WBC # BLD AUTO: 11.42 K/UL (ref 3.9–12.7)
WBC #/AREA URNS HPF: 0 /HPF (ref 0–5)
WBC #/AREA URNS HPF: 0 /HPF (ref 0–5)

## 2024-10-17 PROCEDURE — 99233 SBSQ HOSP IP/OBS HIGH 50: CPT | Mod: ,,, | Performed by: GENERAL PRACTICE

## 2024-10-17 PROCEDURE — 81001 URINALYSIS AUTO W/SCOPE: CPT | Mod: 91 | Performed by: GENERAL PRACTICE

## 2024-10-17 PROCEDURE — 36415 COLL VENOUS BLD VENIPUNCTURE: CPT | Performed by: GENERAL PRACTICE

## 2024-10-17 PROCEDURE — 85384 FIBRINOGEN ACTIVITY: CPT | Performed by: GENERAL PRACTICE

## 2024-10-17 PROCEDURE — 85025 COMPLETE CBC W/AUTO DIFF WBC: CPT | Performed by: GENERAL PRACTICE

## 2024-10-17 PROCEDURE — 25000003 PHARM REV CODE 250: Performed by: GENERAL PRACTICE

## 2024-10-17 PROCEDURE — 12000002 HC ACUTE/MED SURGE SEMI-PRIVATE ROOM

## 2024-10-17 PROCEDURE — 80053 COMPREHEN METABOLIC PANEL: CPT | Performed by: GENERAL PRACTICE

## 2024-10-17 PROCEDURE — 63600175 PHARM REV CODE 636 W HCPCS: Performed by: OBSTETRICS & GYNECOLOGY

## 2024-10-17 PROCEDURE — G0378 HOSPITAL OBSERVATION PER HR: HCPCS

## 2024-10-17 PROCEDURE — 84156 ASSAY OF PROTEIN URINE: CPT | Performed by: GENERAL PRACTICE

## 2024-10-17 PROCEDURE — 96372 THER/PROPH/DIAG INJ SC/IM: CPT | Performed by: OBSTETRICS & GYNECOLOGY

## 2024-10-17 PROCEDURE — 87086 URINE CULTURE/COLONY COUNT: CPT | Performed by: GENERAL PRACTICE

## 2024-10-17 RX ORDER — METOCLOPRAMIDE 10 MG/1
10 TABLET ORAL EVERY 6 HOURS PRN
Status: DISCONTINUED | OUTPATIENT
Start: 2024-10-17 | End: 2024-10-21 | Stop reason: HOSPADM

## 2024-10-17 RX ORDER — ACETAMINOPHEN 500 MG
1000 TABLET ORAL EVERY 6 HOURS PRN
Status: DISCONTINUED | OUTPATIENT
Start: 2024-10-17 | End: 2024-10-21 | Stop reason: HOSPADM

## 2024-10-17 RX ORDER — BETAMETHASONE SODIUM PHOSPHATE AND BETAMETHASONE ACETATE 3; 3 MG/ML; MG/ML
12 INJECTION, SUSPENSION INTRA-ARTICULAR; INTRALESIONAL; INTRAMUSCULAR; SOFT TISSUE ONCE
Status: COMPLETED | OUTPATIENT
Start: 2024-10-17 | End: 2024-10-17

## 2024-10-17 RX ADMIN — BUSPIRONE HYDROCHLORIDE 5 MG: 5 TABLET ORAL at 09:10

## 2024-10-17 RX ADMIN — BETAMETHASONE ACETATE AND BETAMETHASONE SODIUM PHOSPHATE 12 MG: 3; 3 INJECTION, SUSPENSION INTRA-ARTICULAR; INTRALESIONAL; INTRAMUSCULAR; SOFT TISSUE at 06:10

## 2024-10-17 RX ADMIN — ESCITALOPRAM OXALATE 20 MG: 10 TABLET ORAL at 10:10

## 2024-10-17 RX ADMIN — BUSPIRONE HYDROCHLORIDE 5 MG: 5 TABLET ORAL at 10:10

## 2024-10-17 NOTE — PLAN OF CARE
10/17/24 1324   Discharge Assessment   Assessment Type Discharge Planning Assessment   Confirmed/corrected address, phone number and insurance Yes   Confirmed Demographics Correct on Facesheet   Source of Information patient;family   When was your last doctors appointment?   (PCP Dr. Ruby one year ago)   Does patient/caregiver understand observation status Yes   Communicated OLAMIDE with patient/caregiver Date not available/Unable to determine   Reason For Admission No active principal problem   People in Home spouse   Facility Arrived From: Home   Do you expect to return to your current living situation? Yes   Do you have help at home or someone to help you manage your care at home? Yes   Who are your caregiver(s) and their phone number(s)? Sushil Ybarra/spouse [phone]   Prior to hospitilization cognitive status: Alert/Oriented   Current cognitive status: Alert/Oriented   Walking or Climbing Stairs Difficulty no   Dressing/Bathing Difficulty no   Home Accessibility wheelchair accessible   Equipment Currently Used at Home none   Readmission within 30 days? Yes   Patient currently being followed by outpatient case management? No   Do you currently have service(s) that help you manage your care at home? No   Do you take prescription medications? Yes   Do you have prescription coverage? Yes   Coverage Payor: MEDICAID - [facility] -   Do you have any problems affording any of your prescribed medications? No   Is the patient taking medications as prescribed? yes   Who is going to help you get home at discharge? Sushil Ybarra/spouse [phone]   How do you get to doctors appointments? car, drives self   Are you on dialysis? No   Do you take coumadin? No   Discharge Plan A Home with family   Discharge Plan B Home   DME Needed Upon Discharge  none   Discharge Plan discussed with: Patient;Spouse/sig other   Name(s) and Number(s) Sushil Ybarra/spouse [phone]   Transition of Care Barriers  None   Physical Activity   On average, how many days per week do you engage in moderate to strenuous exercise (like a brisk walk)? 0 days   On average, how many minutes do you engage in exercise at this level? 0 min   Financial Resource Strain   How hard is it for you to pay for the very basics like food, housing, medical care, and heating? Not hard   Housing Stability   In the last 12 months, was there a time when you were not able to pay the mortgage or rent on time? N   At any time in the past 12 months, were you homeless or living in a shelter (including now)? N   Transportation Needs   Has the lack of transportation kept you from medical appointments, meetings, work or from getting things needed for daily living? No   Food Insecurity   Within the past 12 months, you worried that your food would run out before you got the money to buy more. Never true   Within the past 12 months, the food you bought just didn't last and you didn't have money to get more. Never true   Stress   Do you feel stress - tense, restless, nervous, or anxious, or unable to sleep at night because your mind is troubled all the time - these days? Not at all   Social Isolation   How often do you feel lonely or isolated from those around you?  Never   Alcohol Use   Q1: How often do you have a drink containing alcohol? Never   Q2: How many drinks containing alcohol do you have on a typical day when you are drinking? None   Q3: How often do you have six or more drinks on one occasion? Never   Utilities   In the past 12 months has the electric, gas, oil, or water company threatened to shut off services in your home? No   Health Literacy   How often do you need to have someone help you when you read instructions, pamphlets, or other written material from your doctor or pharmacy? Never   OTHER   Name(s) of People in Home Sushil Ybarra/spouse [phone]

## 2024-10-17 NOTE — NURSING
"Patient reports feeling better this morning "feeling much better now than when admitted". Patient denies any abd pain, new vag bleeding, reports +FM, and denies changes in vision/RUQ pain/new edema. VS WNL. EFM on x 2. Skin assessment performed. I&O maintained. Call bell within pt reach.   "

## 2024-10-17 NOTE — PROGRESS NOTES
PROGRESS NOTE    10/17/2024  HD 2  TODAY'S EGA = 35w1d     HOSPITAL COURSE   Vira Ybarra is a 29 y.o.  yo  who presented to L&D the evening of 10/16/24 at 35w0d with complaint of vaginal bleeding.  Describes thin pink bloody discharge at first then more of a gush of bright red blood.  Not sure if amniotic fluid was associated.  Was ~1730hrs.  Initial evaluation was overall reassuring that patient had not experienced SROM.  She was kept for observation due to the extend of the vaginal bleeding.      SUBJECTIVE   This morning Vira reports a mild RAMEY.  Typically at home would take tylenol for such.  Denies visual sxs, n/v, or upper abdominal pain.  Has had a couple of low, midline cramping pains overnight.  Bleeding overnight was several bean-sized clots only.  No more gushes of fluid or blood.      OBJECTIVE   Vitals:    10/16/24 1517 10/16/24 1917 10/16/24 2116 10/17/24 0102   BP: (!) 134/96 135/88 (!) 141/86 126/82    10/17/24 0627   BP: (!) 132/90     GEN = alert/oriented, nad, pleasant, resting in bed    FHR: 125bpm baseline, moderate variability, spont accels, no decels  TOCO: rare CTX    OB ULTRASOUND: DATE: 10/17/2024  Single live intrauterine fetus in cephalic position, with biophysical profile score of 8 out of 8.  Placenta is posterior and fundal, grade 1, with no retroplacental fluid collections. Amniotic fluid is adequate, with UYEN of 16.8 cm.     ASSESSMENT / PLAN  29 y.o.  at 35w1d admitted for observation after episode of moderate vaginal bleeding.  Bleeding has largely decreased, but now patient has several mild-range BP's with a mild HA this morning.  Vira appears well, and FHR monitoring has been reassuring.    Labs this AM:  CBC, CMP, fibrinogen, UA/Cx, spot PCR (cath specimen), and start a 24hr urine collection  Tylenol and reglan available prn RAMEY  May have regular diet  Home meds were ordered last night EXCEPT for aspirin  Second dose of BTM for FLM will be  due at 1834hrs today  NICU counseled patient last evening  Continuous EFM/TOCO  Regular diet  Plan of care discussed with the patient    Susan Geiger MD   OB PROBLEM LIST:    History of term stillborn (suspect cord accident)    [  ] APFT 2/week @ 32wks    [  ] serial growth US @ 26-28wks    [  ] RCS scheduled 30 OCT @ 37+0wks    History of  preeclampsia with delivery at 26wks, baseline labs wnl, APLAS labs wnl, on ASA 81mg    History of  section x 2    Depression/anxiety: Currently on Lexapro and BuSpar    Varicella non-immune   FINAL EDC:    2024 by US done 2024      SO Name: Sushil Ybarra ANATOMY SCAN:  DATE: 24 @ 19+0wks:  SIZE = DATES  ANATOMY: wnl but incomplete  PLACENTA: posterior  CERVIX: normal length    DATE: 24 @ 23+1wks:  SIZE = DATES  ANATOMY: wnl but remains incomplete (feet)      INITIAL LABS:  Date: 5/3/2024    CBC: 7.2 > 13.9 / 41.6 < 213    Lab Results   Component Value Date    GROUPTRH A POS 10/16/2024    INDIRECTCOOM NEG 10/16/2024     Lab Results   Component Value Date    HEPBSAG Non-reactive 2024    HEPCAB Non-reactive 2024    TREPABIGMIGG Nonreactive 2024    IVX71SVHS Non-reactive 2024    RUBELLAIMMUN Reactive 2024     Lab Results   Component Value Date    VARICELLAZOS 112.60 2024    VARICELLAINT Negative 2024     Lab Results   Component Value Date    HGBELECTINTE Normal 2024     Lab Results   Component Value Date    LABURIN No growth 2024     Lab Results   Component Value Date    CREATININE 0.6 2024    AST 12 2024    ALT 12 2024    BILITOT 0.3 2024    UTPCR 0.15 2024      Lab Results   Component Value Date    HGBA1C 4.5 2024     LAST PAP: PAP neg / Date: DEC 2023     10-12 WEEK LABS:    PAP: PAP neg / Date:  2023    Lab Results   Component Value Date    LABCHLA Not Detected 2024    LABNGO Not Detected 2024       cfDNA ([id number]):  Low risk.  XY  (MAY 2024)    CARRIER SCREEN (Inheritest Core): possibly not done    24:  Lupus anticoagulant not detected.   This panel did not detect evidence for heparin, direct thrombin inhibitors, or direct Xa inhibitors and drug neutralization was not performed.    Cardiolipin Abs neg    Beta-2 Glycoprotein Abs neg 28 WEEK LABS:    CBC:   1Hr OGTT:  Ferritin:      OTHER LABS:  GBS: ___   OTHER ULTRASOUNDS:  GROWTH US (24 @ 32+0wks):  EFW 2467g = 63 percentile, cephalic presentation, (5lb 7oz), BPP 8/8, Fetal size is AGA with the EFW at the 63% and the AC at the >99%. The EFW is 2467 g and majority of biometry measures 3 weeks ahead of gestational age.    TO-DO THROUGHOUT PREGNANCY:    Depression Screen (20wks):  2024  TDAP (27-36wks):  2024    Plans to breastfeed: ___    Postpartum contraception: ___  Consent for delivery: 2024  Circumcision consent: 2024  BTL counseling: ___   MEDICATIONS:    Prenatal vitamins   Vitamin B6 ALLERGIES:    Iodine (rash in wheezing)    MEDICAL HISTORY:    History gestational hypertension  Asthma   Depression/anxiety/PTSD    Pre-pregnancy BMI = 32 SURGICAL HISTORY:     section   Dilation and curettage    SOCIAL HISTORY:    Smoker: non-smoker  Alcohol: yes but not since +HCG  Drugs: denies  Relationship:  3/21/2024  Domestic Violence: no  Lives with:    Education Level: Some College  Occupation: homemaker  Evangelical:  Lutheran  Other:   GYN HISTORY:    PAP'S: Reported history of abnormal Pap smear but abnormality not recalled  STI'S: no past history  GENITAL HSV: no FAMILY HISTORY:    HTN: Yes - mother and father  DIABETES: Yes - father  BLEEDING D/O: Yes - mother  CLOTTING D/O: Yes - father  BIRTH DEFECTS: No  MENTAL DISABILITY: No  TWINS OR MORE: No  GYN CANCER: Yes - mother  Other:     OBSTETRIC HISTORY   Month/Year Mode of Delivery EGA Wt. M/F Complications / Comments   10/03/2015 Vaginal 41 12 lb 3 oz Male Stillborn secondary to cord  accident.  No history of diabetes   2017  section 40  Female Failed induction of labor   2015  section 26  Female Preeclampsia        First-trimester SAB x4

## 2024-10-17 NOTE — PLAN OF CARE
[facility]  Initial Discharge Assessment       Primary Care Provider: Ruth Ruby MD       met with Pt at bedside to complete discharge assessment. Pt AAOx4s. Demographics, PCP, and insurance verified. No home health. No dialysis. Pt reports ability to complete ADLs without assistance. Pt verbalized plan to discharge home via family transport. Pt has no other needs to be addressed at this time.    Admission Diagnosis: History of pre-eclampsia in prior pregnancy, currently pregnant in second trimester [O09.292]  Third trimester bleeding, antepartum [O46.93]    Admission Date: 10/16/2024  Expected Discharge Date: 10/17/2024    Transition of Care Barriers: None    Payor: MEDICAID / Plan: [employer] [facility] / Product Type: Managed Medicaid /     Extended Emergency Contact Information  Primary Emergency Contact: Sushil Ybarra  Address: [address] [place] [address] [place] [address] [facility]  Home Phone: [phone]  Relation: Spouse    Discharge Plan A: Home with family  Discharge Plan B: Home      [facility] [place] - [address] [facility].  [address] [place], [place] [address] [address]  Phone: [phone] Fax: [phone]      Initial Assessment (most recent)       Adult Discharge Assessment - 10/17/24 1324          Discharge Assessment    Assessment Type Discharge Planning Assessment     Confirmed/corrected address, phone number and insurance Yes     Confirmed Demographics Correct on Facesheet     Source of Information patient;family     When was your last doctors appointment? --   PCP Dr. Ruby one year ago    Does patient/caregiver understand observation status Yes     Communicated OLAMIDE with patient/caregiver Date not available/Unable to determine     Reason For Admission No active principal problem     People in Home spouse     Facility Arrived From: Home     Do you expect to return to your current living situation? Yes     Do you have  help at home or someone to help you manage your care at home? Yes     Who are your caregiver(s) and their phone number(s)? Sushil Ybarra/spouse [phone]     Prior to hospitilization cognitive status: Alert/Oriented     Current cognitive status: Alert/Oriented     Walking or Climbing Stairs Difficulty no     Dressing/Bathing Difficulty no     Home Accessibility wheelchair accessible     Equipment Currently Used at Home none     Readmission within 30 days? Yes     Patient currently being followed by outpatient case management? No     Do you currently have service(s) that help you manage your care at home? No     Do you take prescription medications? Yes     Do you have prescription coverage? Yes     Coverage Payor: MEDICAID - [facility] -     Do you have any problems affording any of your prescribed medications? No     Is the patient taking medications as prescribed? yes     Who is going to help you get home at discharge? Sushil Ybarra/spouse [phone]     How do you get to doctors appointments? car, drives self     Are you on dialysis? No     Do you take coumadin? No     Discharge Plan A Home with family     Discharge Plan B Home     DME Needed Upon Discharge  none     Discharge Plan discussed with: Patient;Spouse/sig other     Name(s) and Number(s) Sushil Ybarra/spouse [phone]     Transition of Care Barriers None        Physical Activity    On average, how many days per week do you engage in moderate to strenuous exercise (like a brisk walk)? 0 days     On average, how many minutes do you engage in exercise at this level? 0 min        Financial Resource Strain    How hard is it for you to pay for the very basics like food, housing, medical care, and heating? Not hard at all        Housing Stability    In the last 12 months, was there a time when you were not able to pay the mortgage or rent on time? No     At any time in the past 12 months, were you homeless or living in a shelter  (including now)? No        Transportation Needs    Has the lack of transportation kept you from medical appointments, meetings, work or from getting things needed for daily living? No        Food Insecurity    Within the past 12 months, you worried that your food would run out before you got the money to buy more. Never true     Within the past 12 months, the food you bought just didn't last and you didn't have money to get more. Never true        Stress    Do you feel stress - tense, restless, nervous, or anxious, or unable to sleep at night because your mind is troubled all the time - these days? Not at all        Social Isolation    How often do you feel lonely or isolated from those around you?  Never        Alcohol Use    Q1: How often do you have a drink containing alcohol? Never     Q2: How many drinks containing alcohol do you have on a typical day when you are drinking? Patient does not drink     Q3: How often do you have six or more drinks on one occasion? Never        Utilities    In the past 12 months has the electric, gas, oil, or water company threatened to shut off services in your home? No        Health Literacy    How often do you need to have someone help you when you read instructions, pamphlets, or other written material from your doctor or pharmacy? Never        OTHER    Name(s) of People in Home Sushil Ybarra/spouse [phone]

## 2024-10-17 NOTE — NURSING
"1912 Introduced self to patient and discussed POC.  Pt agrees. Scant dark bleeding on towel.  Offered disposable panties and pad.  Pt declines.       Pt 2030 Pt up to the bathroom,  Reports scant bleeding in toilet while voiding.  No bleeding on pad. Powerade and ice water provided.  Encouraged PO intake.    2050 Updated Dr. Geiger on floor.      0140 Up to the bathroom.  Voided 400mL dark yellow, cloudy urine with strong smell.  No blood visible.  PO intake has been minimal.  Discussed hydration goals for remainder of shift.  Fresh fluids provided.  Pt agrees.  Reports she has now began having mild discomfort to right flank.  Repositioning for comfort.  SCDs in place.     0530 Up to the bathroom.  Voided 600.  Urine lighter in color than previous assessment.  Remains dark and cloudy.  Pt reports passing 5 small, approximate pea sized clots.  No active bleeding with wiping.  B/P elevated from previous assessment.  Denies visual disturbance, n/v, epigastric pain.  Reports mild headache.  Pt states she has migraines and headache is not unusual.  Asked if this headache presents as typical migraine, "every one I have is different".  Patellar DTR jany 3+.  When assessed, pt states "yea they are always like, I have really big reflexes".  BP frequency increased.        0615 Dr. Geiger on floor.  Updated on recent elevated DBP and other complaints.  New orders entered by provider.     0640 Report given to Shannon Medina RN.  Care turned over at this time.         "

## 2024-10-18 LAB
PROT 24H UR-MRATE: 270 MG/SPEC (ref 6–100)
PROT UR-MCNC: 11 MG/DL (ref 0–15)
URINE COLLECTION DURATION: 24 HR
URINE VOLUME: 2450 ML

## 2024-10-18 PROCEDURE — 99232 SBSQ HOSP IP/OBS MODERATE 35: CPT | Mod: ,,, | Performed by: STUDENT IN AN ORGANIZED HEALTH CARE EDUCATION/TRAINING PROGRAM

## 2024-10-18 PROCEDURE — 12000002 HC ACUTE/MED SURGE SEMI-PRIVATE ROOM

## 2024-10-18 PROCEDURE — 84156 ASSAY OF PROTEIN URINE: CPT | Performed by: OBSTETRICS & GYNECOLOGY

## 2024-10-18 PROCEDURE — 25000003 PHARM REV CODE 250: Performed by: GENERAL PRACTICE

## 2024-10-18 RX ADMIN — BUSPIRONE HYDROCHLORIDE 5 MG: 5 TABLET ORAL at 09:10

## 2024-10-18 RX ADMIN — ACETAMINOPHEN 1000 MG: 500 TABLET, FILM COATED ORAL at 06:10

## 2024-10-18 RX ADMIN — PANTOPRAZOLE SODIUM 40 MG: 40 TABLET, DELAYED RELEASE ORAL at 06:10

## 2024-10-18 RX ADMIN — ESCITALOPRAM OXALATE 20 MG: 10 TABLET ORAL at 09:10

## 2024-10-18 NOTE — NURSING
Periwatch down from: [id number], [id number], and [id number]. Strip was printing in room, audible FHT heard.

## 2024-10-18 NOTE — PROGRESS NOTES
PROGRESS NOTE    10/18/2024  HD 3  TODAY'S EGA = 35w2d     HOSPITAL COURSE   Vira Ybarra is a 29 y.o.  yo  who presented to L&D the evening of 10/16/24 at 35w0d with complaint of vaginal bleeding.  Describes thin pink bloody discharge at first then more of a gush of bright red blood.  Not sure if amniotic fluid was associated.  Was ~1730hrs.  Initial evaluation was overall reassuring that patient had not experienced SROM.  She was kept for observation due to the extent of the vaginal bleeding.      SUBJECTIVE   Patient S&E at beside.  No complaints at this time.  Patient denies any further vaginal bleeding.  +FM.  Has been ambulating, tolerating PO, and voiding without difficulty.  She denies any HA, vision changes, RUQ pain, nondependent edema.      OBJECTIVE   Vitals:    10/17/24 1931 10/17/24 2237 10/17/24 2337 10/18/24 0037   BP: (!) 137/92 121/82 117/78 99/68    10/18/24 0137 10/18/24 0234 10/18/24 0334 10/18/24 0437   BP: 111/78 129/69 132/79 121/74    10/18/24 0537 10/18/24 0739   BP: (!) 127/93 (!) 145/93     Gen: NAD  CV: RRR  Chest: no wheezing  Abd: gravid, NT  Ext: no c/c/e      NST: 120s, Reactive  TOCO: rare CTX    OB ULTRASOUND: DATE: 10/18/2024  Single live intrauterine fetus in cephalic position, with biophysical profile score of 8 out of 8.  Placenta is posterior and fundal, grade 1, with no retroplacental fluid collections. Amniotic fluid is adequate, with UYEN of 16.8 cm.     Labs:  24h urine: 270  PCR: 0.16    Recent Labs     10/16/24  1634 10/16/24  1704 10/17/24  0701 10/17/24  0708   HGB 11.5*  --  12.0  --    HCT 36.0*  --  37.4  --      --  168  --    FIBRINOGEN  --  426*  --  473*         ASSESSMENT / PLAN  29 y.o.  at 35w1d admitted for observation after episode of moderate vaginal bleeding.  Occ mild range BPs.  Urine protein studies normal.    3T bleeding - appears resolving  GHTN     S/P ACS on 10.17.24     Continue NST BID.  Discussed this would also  vary based on the discretion of on-call MD  Patient appear more reassured after speaking with Dr. Stringer and discussing plan of care of continued inpatient monitoring until delivery.   Regular Diet  Encouraged ambulation while inpatient      Silvana Carrizales MD   OB PROBLEM LIST:    History of term stillborn (suspect cord accident)    [  ] APFT 2/week @ 32wks    [  ] serial growth US @ 26-28wks    [  ] RCS scheduled 30 OCT @ 37+0wks    History of  preeclampsia with delivery at 26wks, baseline labs wnl, APLAS labs wnl, on ASA 81mg    History of  section x 2    Depression/anxiety: Currently on Lexapro and BuSpar    Varicella non-immune   FINAL EDC:    2024 by US done 2024      SO Name: Sushil Ybarra ANATOMY SCAN:  DATE: 24 @ 19+0wks:  SIZE = DATES  ANATOMY: wnl but incomplete  PLACENTA: posterior  CERVIX: normal length    DATE: 24 @ 23+1wks:  SIZE = DATES  ANATOMY: wnl but remains incomplete (feet)      INITIAL LABS:  Date: 5/3/2024    CBC: 7.2 > 13.9 / 41.6 < 213    Lab Results   Component Value Date    GROUPTRH A POS 10/16/2024    INDIRECTCOOM NEG 10/16/2024     Lab Results   Component Value Date    HEPBSAG Non-reactive 2024    HEPCAB Non-reactive 2024    TREPABIGMIGG Nonreactive 2024    QXJ15PRNS Non-reactive 2024    RUBELLAIMMUN Reactive 2024     Lab Results   Component Value Date    VARICELLAZOS 112.60 2024    VARICELLAINT Negative 2024     Lab Results   Component Value Date    HGBELECTINTE Normal 2024     Lab Results   Component Value Date    LABURIN No growth to date 10/17/2024     Lab Results   Component Value Date    CREATININE 0.5 10/17/2024    AST 11 10/17/2024    ALT 9 (L) 10/17/2024    BILITOT 0.5 10/17/2024    UTPCR 0.16 10/17/2024      Lab Results   Component Value Date    HGBA1C 4.5 2024     LAST PAP: PAP neg / Date: DEC 2023     10-12 WEEK LABS:    PAP: PAP neg / Date:  2023    Lab Results   Component Value Date     LABCHLA Not Detected 2024    LABNGO Not Detected 2024       cfDNA (Blisdern14):  Low risk.  XY (MAY 2024)    CARRIER SCREEN (Inheritest Core): possibly not done    24:  Lupus anticoagulant not detected.   This panel did not detect evidence for heparin, direct thrombin inhibitors, or direct Xa inhibitors and drug neutralization was not performed.    Cardiolipin Abs neg    Beta-2 Glycoprotein Abs neg 28 WEEK LABS:    CBC:   1Hr OGTT:  Ferritin:      OTHER LABS:  GBS: ___   OTHER ULTRASOUNDS:  GROWTH US (24 @ 32+0wks):  EFW 2467g = 63 percentile, cephalic presentation, (5lb 7oz), BPP 8/8, Fetal size is AGA with the EFW at the 63% and the AC at the >99%. The EFW is 2467 g and majority of biometry measures 3 weeks ahead of gestational age.    TO-DO THROUGHOUT PREGNANCY:    Depression Screen (20wks):  2024  TDAP (27-36wks):  2024    Plans to breastfeed: ___    Postpartum contraception: ___  Consent for delivery: 2024  Circumcision consent: 2024  BTL counseling: ___   MEDICATIONS:    Prenatal vitamins   Vitamin B6 ALLERGIES:    Iodine (rash in wheezing)    MEDICAL HISTORY:    History gestational hypertension  Asthma   Depression/anxiety/PTSD    Pre-pregnancy BMI = 32 SURGICAL HISTORY:     section   Dilation and curettage    SOCIAL HISTORY:    Smoker: non-smoker  Alcohol: yes but not since +HCG  Drugs: denies  Relationship:  3/21/2024  Domestic Violence: no  Lives with:    Education Level: Some College  Occupation: homemaker  Tenriism:  Jain  Other:   GYN HISTORY:    PAP'S: Reported history of abnormal Pap smear but abnormality not recalled  STI'S: no past history  GENITAL HSV: no FAMILY HISTORY:    HTN: Yes - mother and father  DIABETES: Yes - father  BLEEDING D/O: Yes - mother  CLOTTING D/O: Yes - father  BIRTH DEFECTS: No  MENTAL DISABILITY: No  TWINS OR MORE: No  GYN CANCER: Yes - mother  Other:     OBSTETRIC HISTORY   Month/Year Mode of Delivery EGA  Wt. 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  No history of diabetes   2017  section 40  Female Failed induction of labor   2015  section 26  Female Preeclampsia        First-trimester SAB x4

## 2024-10-19 PROCEDURE — 59025 FETAL NON-STRESS TEST: CPT

## 2024-10-19 PROCEDURE — 25000003 PHARM REV CODE 250: Performed by: GENERAL PRACTICE

## 2024-10-19 PROCEDURE — 12000002 HC ACUTE/MED SURGE SEMI-PRIVATE ROOM

## 2024-10-19 RX ADMIN — PANTOPRAZOLE SODIUM 40 MG: 40 TABLET, DELAYED RELEASE ORAL at 05:10

## 2024-10-19 RX ADMIN — ESCITALOPRAM OXALATE 20 MG: 10 TABLET ORAL at 08:10

## 2024-10-19 RX ADMIN — PRENATAL VIT W/ FE FUMARATE-FA TAB 27-0.8 MG 1 TABLET: 27-0.8 TAB at 08:10

## 2024-10-19 RX ADMIN — BUSPIRONE HYDROCHLORIDE 5 MG: 5 TABLET ORAL at 08:10

## 2024-10-19 NOTE — PROGRESS NOTES
[facility]  Obstetrics  Antepartum Progress Note    Patient Name: Vira Ybarra  MRN: [id number]  Admission Date: 10/16/2024  Hospital Length of Stay: 1 days  Attending Physician: Cameron Stringer MD  Primary Care Provider: Ruth Ruby MD    Subjective:     Principal Problem:<principal problem not specified>    HPI:  No notes on file    Hospital Course:  No notes on file    Obstetric HPI:  Patient reports None contractions, active fetal movement, absent vaginal bleeding , absent loss of fluid    No complaints this morning.    Objective:     Vital Signs (Most Recent):  Temp: 98.4 °F (36.9 °C) (10/19/24 08)  Pulse: 70 (10/19/24 0900)  Resp: 17 (10/19/24 0858)  BP: 128/74 (10/19/24 0858)  SpO2: 99 % (10/19/24 0900) Vital Signs (24h Range):  Temp:  [98.2 °F (36.8 °C)-98.8 °F (37.1 °C)] 98.4 °F (36.9 °C)  Pulse:  [64-86] 70  Resp:  [17-18] 17  SpO2:  [97 %-100 %] 99 %  BP: (118-135)/(70-86) 128/74     Weight: 93 kg (205 lb)  Body mass index is 33.09 kg/m².    FHT: Cat 1 (reassuring)  TOCO:  No contractions    No intake or output data in the 24 hours ending 10/19/24 1006         Significant Labs:  Recent Lab Results       None            Physical Exam  Gen - NAD  Abdomen - soft, gravid, non tender  Ext - no edema, no calf tenderness     Review of Systems  Assessment/Plan:     29 y.o. female  at 35w3d for:    Prior poor obstetrical history in third trimester, antepartum  IUP at 35 wga    GHTN - BPs normal, no meds, urine protein 224  Vaginal bleeding - no bleeding in 3 days  Poor obstetric history - hx of early delivery for severe pre-e and one full term IUFD, continue NST daily  Continue inpatient care per primary OB Dr. Siomara Hammond MD  Obstetrics  [facility]           hard copy, drawn during this pregnancy

## 2024-10-19 NOTE — SUBJECTIVE & OBJECTIVE
Obstetric HPI:  Patient reports None contractions, active fetal movement, absent vaginal bleeding , absent loss of fluid    No complaints this morning.    Objective:     Vital Signs (Most Recent):  Temp: 98.4 °F (36.9 °C) (10/19/24 0858)  Pulse: 70 (10/19/24 0900)  Resp: 17 (10/19/24 0858)  BP: 128/74 (10/19/24 0858)  SpO2: 99 % (10/19/24 0900) Vital Signs (24h Range):  Temp:  [98.2 °F (36.8 °C)-98.8 °F (37.1 °C)] 98.4 °F (36.9 °C)  Pulse:  [64-86] 70  Resp:  [17-18] 17  SpO2:  [97 %-100 %] 99 %  BP: (118-135)/(70-86) 128/74     Weight: 93 kg (205 lb)  Body mass index is 33.09 kg/m².    FHT: Cat 1 (reassuring)  TOCO:  No contractions    No intake or output data in the 24 hours ending 10/19/24 1006         Significant Labs:  Recent Lab Results       None            Physical Exam  Gen - NAD  Abdomen - soft, gravid, non tender  Ext - no edema, no calf tenderness     Review of Systems

## 2024-10-19 NOTE — ASSESSMENT & PLAN NOTE
IUP at 35 wga    GHTN - BPs normal, no meds, urine protein 224  Vaginal bleeding - no bleeding in 3 days  Poor obstetric history - hx of early delivery for severe pre-e and one full term IUFD, continue NST daily  Continue inpatient care per primary OB Dr. Stringer

## 2024-10-20 PROCEDURE — 12000002 HC ACUTE/MED SURGE SEMI-PRIVATE ROOM

## 2024-10-20 PROCEDURE — 25000003 PHARM REV CODE 250: Performed by: OBSTETRICS & GYNECOLOGY

## 2024-10-20 PROCEDURE — 25000003 PHARM REV CODE 250: Performed by: GENERAL PRACTICE

## 2024-10-20 PROCEDURE — 59025 FETAL NON-STRESS TEST: CPT

## 2024-10-20 RX ORDER — HYDROXYZINE PAMOATE 25 MG/1
50 CAPSULE ORAL EVERY 8 HOURS PRN
Status: DISCONTINUED | OUTPATIENT
Start: 2024-10-20 | End: 2024-10-21 | Stop reason: HOSPADM

## 2024-10-20 RX ORDER — BUSPIRONE HYDROCHLORIDE 5 MG/1
5 TABLET ORAL 2 TIMES DAILY
Status: DISCONTINUED | OUTPATIENT
Start: 2024-10-20 | End: 2024-10-21 | Stop reason: HOSPADM

## 2024-10-20 RX ORDER — CALCIUM CARBONATE 200(500)MG
1000 TABLET,CHEWABLE ORAL 3 TIMES DAILY PRN
Status: DISCONTINUED | OUTPATIENT
Start: 2024-10-20 | End: 2024-10-21 | Stop reason: HOSPADM

## 2024-10-20 RX ADMIN — ESCITALOPRAM OXALATE 20 MG: 10 TABLET ORAL at 09:10

## 2024-10-20 RX ADMIN — HYDROXYZINE PAMOATE 50 MG: 25 CAPSULE ORAL at 03:10

## 2024-10-20 RX ADMIN — ACETAMINOPHEN 1000 MG: 500 TABLET, FILM COATED ORAL at 10:10

## 2024-10-20 RX ADMIN — HYDROXYZINE PAMOATE 50 MG: 25 CAPSULE ORAL at 09:10

## 2024-10-20 RX ADMIN — PRENATAL VIT W/ FE FUMARATE-FA TAB 27-0.8 MG 1 TABLET: 27-0.8 TAB at 09:10

## 2024-10-20 RX ADMIN — BUSPIRONE HYDROCHLORIDE 5 MG: 5 TABLET ORAL at 09:10

## 2024-10-20 RX ADMIN — CALCIUM CARBONATE (ANTACID) CHEW TAB 500 MG 1000 MG: 500 CHEW TAB at 03:10

## 2024-10-20 RX ADMIN — ACETAMINOPHEN 1000 MG: 500 TABLET, FILM COATED ORAL at 09:10

## 2024-10-20 NOTE — ASSESSMENT & PLAN NOTE
IUP at 35 wga    GHTN - BPs normal, no meds, urine protein 224  Vaginal bleeding - no bleeding in 4 days  Poor obstetric history - hx of early delivery for severe pre-e and one full term IUFD, continue NST daily  Continue inpatient care per primary OB Dr. Stringer

## 2024-10-20 NOTE — PROGRESS NOTES
[facility]  Obstetrics  Antepartum Progress Note    Patient Name: Vira Ybarra  MRN: [id number]  Admission Date: 10/16/2024  Hospital Length of Stay: 2 days  Attending Physician: Cameron Stringer MD  Primary Care Provider: Ruth Ruby MD    Subjective:     Principal Problem:<principal problem not specified>    HPI:  No notes on file    Hospital Course:  No notes on file    Obstetric HPI:  Patient reports None contractions, active fetal movement, absent vaginal bleeding , absent loss of fluid      Patient had chest heaviness last night - had EKG (normal per hospitalist review).  Pt was given one dose of vistaril and her chest heaviness resolved.  This morning she is without complaints.  We discussed increasing her buspar to the usual dose of BID.    Objective:     Vital Signs (Most Recent):  Temp: 98.5 °F (36.9 °C) (10/19/24 2021)  Pulse: (!) 58 (10/20/24 0613)  Resp: 18 (10/20/24 0309)  BP: 130/89 (10/20/24 0341)  SpO2: 97 % (10/20/24 0613) Vital Signs (24h Range):  Temp:  [98.4 °F (36.9 °C)-98.8 °F (37.1 °C)] 98.5 °F (36.9 °C)  Pulse:  [58-92] 58  Resp:  [16-18] 18  SpO2:  [92 %-99 %] 97 %  BP: (104-146)/(71-97) 130/89     Weight: 93 kg (205 lb)  Body mass index is 33.09 kg/m².    FHT: Cat 1  (from yesterday)  TOCO:  --    No intake or output data in the 24 hours ending 10/20/24 0830      Significant Labs:  Recent Lab Results       None            Physical Exam    Gen - NAD  Abdomen - soft, gravid, non tender  Ext - no edema, no calf tenderness    Review of Systems  Assessment/Plan:     29 y.o. female  at 35w4d for:    Prior poor obstetrical history in third trimester, antepartum  IUP at 35 wga    GHTN - BPs normal, no meds, urine protein 224  Vaginal bleeding - no bleeding in 4 days  Poor obstetric history - hx of early delivery for severe pre-e and one full term IUFD, continue NST daily  Continue inpatient care per primary OB Dr. Stringer      Anxiety - increase buspar to  twice a day    Licha Hammond MD  Obstetrics  [facility]           Acute illness or injury

## 2024-10-20 NOTE — SUBJECTIVE & OBJECTIVE
Obstetric HPI:  Patient reports None contractions, active fetal movement, absent vaginal bleeding , absent loss of fluid      Patient had chest heaviness last night - had EKG (normal per hospitalist review).  Pt was given one dose of vistaril and her chest heaviness resolved.  This morning she is without complaints.  We discussed increasing her buspar to the usual dose of BID.    Objective:     Vital Signs (Most Recent):  Temp: 98.5 °F (36.9 °C) (10/19/24 2021)  Pulse: (!) 58 (10/20/24 0613)  Resp: 18 (10/20/24 0309)  BP: 130/89 (10/20/24 0341)  SpO2: 97 % (10/20/24 0613) Vital Signs (24h Range):  Temp:  [98.4 °F (36.9 °C)-98.8 °F (37.1 °C)] 98.5 °F (36.9 °C)  Pulse:  [58-92] 58  Resp:  [16-18] 18  SpO2:  [92 %-99 %] 97 %  BP: (104-146)/(71-97) 130/89     Weight: 93 kg (205 lb)  Body mass index is 33.09 kg/m².    FHT: Cat 1  (from yesterday)  TOCO:  --    No intake or output data in the 24 hours ending 10/20/24 0830      Significant Labs:  Recent Lab Results       None            Physical Exam    Gen - NAD  Abdomen - soft, gravid, non tender  Ext - no edema, no calf tenderness    Review of Systems

## 2024-10-20 NOTE — NURSING
"0310 Pt calls out with complaints that she vomited twice and has chest pressure "as if something is pushing really hard on my chest".  Vital signs taken.  Respirations are easy and unlabored.  No appearance of distress.   at bedside and reports "she just randomly does this sometimes".  Discussed reflux symptoms, pt doesn't feel like this is reflux.      0325 Notified Dr. Hammond of patient complaints, vital signs and assessment.  New orders for EKG, Hospitalist consult and PRN medications.    0335 Discussed POC with patient. Pt agrees and request PRN medication.      0354 MALCOLM Odom RN gave PRN meds.  With conversation patient states "I'm about ready to tell y'all just to take him".  RN asked if she feels this way because the pain in her chest is bad.  Pt then states "No, it's really just this bed that so uncomfortable" and requested a waffle mattress for bed.      0410 RN came to room to place waffle cushion.  Pt up out of bed with ease and assisted RN in making bed.  Reports bed is more comfortable now. No further complaints.    0450 Rounding complete, pt resting in bed with eyes closed.  Awoke when entered room.  Denies any discomfort at this time.     0455 Hospitalist at bedside.      0500 Pt denies any chest discomfort to hospitalist.  MD reviewed EKG tracing.  States if symptoms return, please consult again.         "

## 2024-10-21 VITALS
TEMPERATURE: 98 F | HEIGHT: 66 IN | WEIGHT: 205 LBS | HEART RATE: 80 BPM | BODY MASS INDEX: 32.95 KG/M2 | DIASTOLIC BLOOD PRESSURE: 93 MMHG | OXYGEN SATURATION: 97 % | SYSTOLIC BLOOD PRESSURE: 136 MMHG | RESPIRATION RATE: 18 BRPM

## 2024-10-21 PROBLEM — O46.93 THIRD TRIMESTER BLEEDING: Status: ACTIVE | Noted: 2024-10-21

## 2024-10-21 PROBLEM — O09.90 SUPERVISION OF HIGH RISK PREGNANCY, ANTEPARTUM: Status: ACTIVE | Noted: 2024-10-21

## 2024-10-21 LAB
ALBUMIN SERPL BCP-MCNC: 3.5 G/DL (ref 3.5–5.2)
ALP SERPL-CCNC: 124 U/L (ref 55–135)
ALT SERPL W/O P-5'-P-CCNC: 9 U/L (ref 10–44)
ANION GAP SERPL CALC-SCNC: 6 MMOL/L (ref 8–16)
AST SERPL-CCNC: 9 U/L (ref 10–40)
BACTERIA UR CULT: NO GROWTH
BASOPHILS # BLD AUTO: 0.05 K/UL (ref 0–0.2)
BASOPHILS NFR BLD: 0.5 % (ref 0–1.9)
BILIRUB SERPL-MCNC: 0.3 MG/DL (ref 0.1–1)
BUN SERPL-MCNC: 9 MG/DL (ref 6–20)
CALCIUM SERPL-MCNC: 8.8 MG/DL (ref 8.7–10.5)
CHLORIDE SERPL-SCNC: 107 MMOL/L (ref 95–110)
CO2 SERPL-SCNC: 25 MMOL/L (ref 23–29)
CREAT SERPL-MCNC: 0.6 MG/DL (ref 0.5–1.4)
DIFFERENTIAL METHOD BLD: ABNORMAL
EOSINOPHIL # BLD AUTO: 0.1 K/UL (ref 0–0.5)
EOSINOPHIL NFR BLD: 0.5 % (ref 0–8)
ERYTHROCYTE [DISTWIDTH] IN BLOOD BY AUTOMATED COUNT: 14.2 % (ref 11.5–14.5)
EST. GFR  (NO RACE VARIABLE): >60 ML/MIN/1.73 M^2
GLUCOSE SERPL-MCNC: 98 MG/DL (ref 70–110)
HCT VFR BLD AUTO: 38.4 % (ref 37–48.5)
HGB BLD-MCNC: 11.7 G/DL (ref 12–16)
IMM GRANULOCYTES # BLD AUTO: 0.08 K/UL (ref 0–0.04)
IMM GRANULOCYTES NFR BLD AUTO: 0.8 % (ref 0–0.5)
LYMPHOCYTES # BLD AUTO: 2 K/UL (ref 1–4.8)
LYMPHOCYTES NFR BLD: 20.5 % (ref 18–48)
MCH RBC QN AUTO: 26.6 PG (ref 27–31)
MCHC RBC AUTO-ENTMCNC: 30.5 G/DL (ref 32–36)
MCV RBC AUTO: 87 FL (ref 82–98)
MONOCYTES # BLD AUTO: 0.7 K/UL (ref 0.3–1)
MONOCYTES NFR BLD: 6.6 % (ref 4–15)
NEUTROPHILS # BLD AUTO: 7.1 K/UL (ref 1.8–7.7)
NEUTROPHILS NFR BLD: 71.1 % (ref 38–73)
NRBC BLD-RTO: 0 /100 WBC
PLATELET # BLD AUTO: 171 K/UL (ref 150–450)
PMV BLD AUTO: 10.7 FL (ref 9.2–12.9)
POTASSIUM SERPL-SCNC: 4.2 MMOL/L (ref 3.5–5.1)
PROT SERPL-MCNC: 6.1 G/DL (ref 6–8.4)
RBC # BLD AUTO: 4.4 M/UL (ref 4–5.4)
SODIUM SERPL-SCNC: 138 MMOL/L (ref 136–145)
WBC # BLD AUTO: 9.93 K/UL (ref 3.9–12.7)

## 2024-10-21 PROCEDURE — 85025 COMPLETE CBC W/AUTO DIFF WBC: CPT | Performed by: GENERAL PRACTICE

## 2024-10-21 PROCEDURE — 25000003 PHARM REV CODE 250: Performed by: OBSTETRICS & GYNECOLOGY

## 2024-10-21 PROCEDURE — 99238 HOSP IP/OBS DSCHRG MGMT 30/<: CPT | Mod: ,,, | Performed by: GENERAL PRACTICE

## 2024-10-21 PROCEDURE — 80053 COMPREHEN METABOLIC PANEL: CPT | Performed by: GENERAL PRACTICE

## 2024-10-21 PROCEDURE — 36415 COLL VENOUS BLD VENIPUNCTURE: CPT | Performed by: GENERAL PRACTICE

## 2024-10-21 PROCEDURE — 25000003 PHARM REV CODE 250: Performed by: GENERAL PRACTICE

## 2024-10-21 RX ADMIN — PANTOPRAZOLE SODIUM 40 MG: 40 TABLET, DELAYED RELEASE ORAL at 09:10

## 2024-10-21 RX ADMIN — BUSPIRONE HYDROCHLORIDE 5 MG: 5 TABLET ORAL at 09:10

## 2024-10-21 RX ADMIN — PRENATAL VIT W/ FE FUMARATE-FA TAB 27-0.8 MG 1 TABLET: 27-0.8 TAB at 08:10

## 2024-10-21 RX ADMIN — ACETAMINOPHEN 1000 MG: 500 TABLET, FILM COATED ORAL at 08:10

## 2024-10-21 NOTE — PROGRESS NOTES
PROGRESS NOTE    ADMISSION DATE = 10/16/2024  TODAY'S DATE = 10/21/2024  HD 6  TODAY'S EGA = 35w5d      HOSPITAL COURSE   Vira Ybarra is a 29 y.o.  yo  who presented to L&D the evening of 10/16/24 at 35w0d with complaint of vaginal bleeding and possible gush of fluid.  Did not have ROM per extensive evaluation.  She was kept for observation due to the extent of the vaginal bleeding.  She completed a course of steroids @ 35+1wks for FLM.  Noted to have mild-range BP's while hospitalized.  Labs reassuring and 24hr UP = 270mg.  She had a growth ultrasound at 35+5wks which was normal, as was a BPP.  Ultimately she is discharged home with plan for continued close outpatient monitoring.    SUBJECTIVE   Has a headache this morning - just took tylenol.  Denies visual sxs, n/v, RUQ pain.  Active FM.  Having some back pain but no contractions.  No VB or LOF.    OBJECTIVE   Vitals:    10/20/24 0823 10/20/24 1200 10/20/24 1545 10/20/24 2059   BP: 133/86 128/71 136/83 (!) 126/97    10/21/24 0004 10/21/24 0401 10/21/24 0753 10/21/24 0759   BP: 112/71 120/75 (!) 144/107 (!) 140/95    10/21/24 0814 10/21/24 0824   BP: (!) 141/103 (!) 136/93     GEN = alert/oriented, nad, pleasant, resting in bed  HEENT = sclera anicteric, EOM grossly normal  CV = BP and HR as per vitals  PULM = normal respiratory effort   = deferred, no concerns    NST: normal baseline, moderate variability, spont accels, no decels  TOCO: rare CTX    Lab Results   Component Value Date    WBC 9.93 10/21/2024    HGB 11.7 (L) 10/21/2024    HCT 38.4 10/21/2024     10/21/2024    MCV 87 10/21/2024     Lab Results   Component Value Date     10/21/2024    K 4.2 10/21/2024     10/21/2024    CO2 25 10/21/2024    BUN 9 10/21/2024    CREATININE 0.6 10/21/2024    GLU 98 10/21/2024    AST 9 (L) 10/21/2024    ALT 9 (L) 10/21/2024    BILITOT 0.3 10/21/2024      GROWTH US (10/21/2024 @ 35+5wks):  EFW 2778g = 53 percentile, cephalic  "presentation, (6lb 2oz)  BPP  with UYEN 16cm    Labs:  24h urine protein = 270mg (10/18/24)    ASSESSMENT / PLAN  29 y.o.  at 35w5d today, admitted for observation at 35+0wks after episode of moderate vaginal bleeding.  Bleeding subsided with no clear etiology.  Has since developed GHTN.  Steroid complete.  Fetal and maternal monitoring have been reassuring with no e/o severe disease as per labs/rads above.    Discharge to home today  WED 10/23 @ 36+0wks: start 24hr urine collection at home to turn in THU AM prior to appointments  THU 10/24 @ 36+1wks: NST and RENETTA appointment with Dr. Carrizales  MON 10/28 @ 36+5wks: CBC / CMP, BPP, RENETTA appointment  WED 10/30 @ 37+0wks: scheduled repeat     Susan Geiger MD   OB PROBLEM LIST:    History of term stillborn (suspect cord accident)    [  ] APFT 2/week    [  ] serial growth US    [  ] RCS scheduled 30 OCT @ 37+0wks    Current GHTN with history of  preeclampsia with delivery at 26wks, baseline labs wnl, APLAS labs wnl, on ASA 81mg    [  ] weekly labs    History of  section x 2    Depression/anxiety: Currently on Lexapro and BuSpar    Varicella non-immune   FINAL EDC:    2024 by US done 2024    Baby Boy!  "Miller Barrett"    SO Name: Sushil Ybarra ANATOMY SCAN:  DATE: 24 @ 19+0wks:  SIZE = DATES  ANATOMY: wnl but incomplete  PLACENTA: posterior  CERVIX: normal length    DATE: 24 @ 23+1wks:  SIZE = DATES  ANATOMY: wnl but remains incomplete (feet)      INITIAL LABS:  Date: 5/3/2024    CBC: 7.2 > 13.9 / 41.6 < 213    Lab Results   Component Value Date    GROUPTRH A POS 10/16/2024    INDIRECTCOOM NEG 10/16/2024     Lab Results   Component Value Date    HEPBSAG Non-reactive 2024    HEPCAB Non-reactive 2024    TREPABIGMIGG Nonreactive 2024    KZR61FDHQ Non-reactive 2024    RUBELLAIMMUN Reactive 2024     Lab Results   Component Value Date    VARICELLAZOS 112.60 2024    VARICELLAINT Negative " 2024     Lab Results   Component Value Date    HGBELECTINTE Normal 2024     Lab Results   Component Value Date    LABURIN 270mg 10/17/2024     Lab Results   Component Value Date    CREATININE 0.5 10/17/2024    AST 11 10/17/2024    ALT 9 (L) 10/17/2024    BILITOT 0.5 10/17/2024    UTPCR 0.16 10/17/2024      Lab Results   Component Value Date    HGBA1C 4.5 2024     LAST PAP: PAP neg / Date: DEC 2023   10-12 WEEK LABS:    PAP: PAP neg / Date:  2023    Lab Results   Component Value Date    LABCHLA Not Detected 2024    LABNGO Not Detected 2024       cfDNA ([id number]):  Low risk.  XY (MAY 2024)    CARRIER SCREEN (Inheritest Core): possibly not done    24:  Lupus anticoagulant not detected.   This panel did not detect evidence for heparin, direct thrombin inhibitors, or direct Xa inhibitors and drug neutralization was not performed.    Cardiolipin Abs neg    Beta-2 Glycoprotein Abs neg 28 WEEK LABS:    CBC:   1Hr OGTT:  Ferritin:      OTHER LABS:  GBS: ___   OTHER ULTRASOUNDS:  GROWTH US (24 @ 32+0wks):  EFW 2467g = 63 percentile, cephalic presentation, (5lb 7oz), BPP 8/8, Fetal size is AGA with the EFW at the 63% and the AC at the >99%. The EFW is 2467 g and majority of biometry measures 3 weeks ahead of gestational age.     GROWTH US (10/21/2024 @ 35+5wks):  EFW 2778g = 53 percentile, cephalic presentation, (6lb 2oz)  BPP 8/8 with UYEN 16cm   TO-DO THROUGHOUT PREGNANCY:    Depression Screen (20wks):  2024  TDAP (27-36wks):  2024    Plans to breastfeed: ___    Postpartum contraception: ___  Consent for delivery: 2024  Circumcision consent: 2024  BTL counseling: ___   MEDICATIONS:    Prenatal vitamins   Vitamin B6 ALLERGIES:    Iodine (rash in wheezing)    MEDICAL HISTORY:    History gestational hypertension  Asthma   Depression/anxiety/PTSD    Pre-pregnancy BMI = 32 SURGICAL HISTORY:     section   Dilation and curettage    SOCIAL  HISTORY:    Smoker: non-smoker  Alcohol: yes but not since +HCG  Drugs: denies  Relationship:  3/21/2024  Domestic Violence: no  Lives with:    Education Level: Some College  Occupation: homemaker  Sabianist:  [employer]  Other:   GYN HISTORY:    PAP'S: Reported history of abnormal Pap smear but abnormality not recalled  STI'S: no past history  GENITAL HSV: no FAMILY HISTORY:    HTN: Yes - mother and father  DIABETES: Yes - father  BLEEDING D/O: Yes - mother  CLOTTING D/O: Yes - father  BIRTH DEFECTS: No  MENTAL DISABILITY: No  TWINS OR MORE: No  GYN CANCER: Yes - mother  Other:     OBSTETRIC HISTORY   Month/Year Mode of Delivery EGA Wt. 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  No history of diabetes   2017  section 40  Female Failed induction of labor   2015  section 26  Female Preeclampsia        First-trimester SAB x4

## 2024-10-21 NOTE — DISCHARGE INSTRUCTIONS
Keep your scheduled appointment with your provider.    Call your Doctor if you have any of the following:  Temperature above 100 degrees  Nausea, vomiting and/or diarrhea  Severe headache, dizziness, or blurred vision  Notable increase in swelling of hands or feet  Notable swelling of face and lips  Difficulty, pain or burning with urination  Foul smelling vaginal discharge  Decreased fetal movement    Come to the hospital if you have any of the following symptoms:  Your water breaks  More than 4-6 contractions in 1 hour for 2 or more hours  Vaginal bleeding like a period    After 28 weeks, you should feel 10 distinct fetal movements within a 2 hour period.    It is recommended that you drink 1/2 a gallon of water each day.  Tea, Soda and Juice are  in addition to this.    Labor and Delivery Phone number: [phone]    If you need to be seen on Labor and delivery between the hours of 6pm and 7am, please enter through the Emergency room entrance.

## 2024-10-21 NOTE — NURSING
7040 Dr. Geiger at  discussing POC with pt.  May d/c pt to home if ultrasound and labs are stable.  Pt verbalizes understanding is ok with going home.  1230 Pt discharged to home in stable condition.  24hr urine collection instructions and supplies given. Pt given follow up appt times.  Pt left via ambulatory with SO.

## 2024-10-21 NOTE — DISCHARGE SUMMARY
ANTEPARTUM OB DISCHARGE SUMMARY    ADMISSION DATE = 10/16/2024  DISCHARGE DATE = 10/21/2024  HD 6  TODAY'S EGA = 35w5d       HOSPITAL COURSE   Vira Ybarra is a 29 y.o.  yo  who presented to L&D the evening of 10/16/24 at 35w0d with complaint of vaginal bleeding and possible gush of fluid.  Did not have ROM per extensive evaluation.  She was kept for observation due to the extent of the vaginal bleeding.  She completed a course of steroids @ 35+1wks for FLM.  Noted to have mild-range BP's while hospitalized.  Labs reassuring and 24hr UP = 270mg.  She had a growth ultrasound at 35+5wks which was normal, as was a BPP.  Ultimately she is discharged home with plan for continued close outpatient monitoring.    OB PROBLEM LIST:  History of term stillborn (suspect cord accident)    [  ] APFT 2/week    [  ] serial growth US    [  ] RCS scheduled 30 OCT @ 37+0wks     Current GHTN with history of  preeclampsia with delivery at 26wks, baseline labs wnl, APLAS labs wnl, on ASA 81mg    [  ] weekly labs     History of  section x 2     Depression/anxiety: Currently on Lexapro and BuSpar     Varicella non-immune    RELEVANT LABS / RADS   Lab Results   Component Value Date     WBC 9.93 10/21/2024     HGB 11.7 (L) 10/21/2024     HCT 38.4 10/21/2024      10/21/2024     MCV 87 10/21/2024           Lab Results   Component Value Date      10/21/2024     K 4.2 10/21/2024      10/21/2024     CO2 25 10/21/2024     BUN 9 10/21/2024     CREATININE 0.6 10/21/2024     GLU 98 10/21/2024     AST 9 (L) 10/21/2024     ALT 9 (L) 10/21/2024     BILITOT 0.3 10/21/2024      GROWTH US (10/21/2024 @ 35+5wks):  EFW 2778g = 53 percentile, cephalic presentation, (6lb 2oz)  BPP 8/8 with UYEN 16cm     Labs:  24h urine protein = 270mg (10/18/24)    PLANNED FOLLOW-UP   WED 10/23 @ 36+0wks: start 24hr urine collection at home to turn in THU AM prior to appointments  THU 10/24 @ 36+1wks:  Turn in 24hr urine  collection  NST and OB appointment with Dr. Carrizales  MON 10/28 @ 36+5wks:  [facility] [address] for CBC / CMP  BPP and OB appointment with Dr. Geiger  WED 10/30 @ 37+0wks: scheduled repeat  with Dr. Stringer (Dr. Geiger assisting)    MEDICATIONS   Continue:  aspirin 81mg, prenatal vitamin, and omeprazole daily  Continue:  iron every other day  Continue:  azelastine and ondansetron as needed  Continue:  escitalopram (Lexapro) 20 mg every night  Change:  buspirone (buspar) to 5 mg twice a day

## 2024-10-22 ENCOUNTER — TELEPHONE (OUTPATIENT)
Dept: OBSTETRICS AND GYNECOLOGY | Facility: CLINIC | Age: 29
End: 2024-10-22
Payer: MEDICAID

## 2024-10-22 NOTE — TELEPHONE ENCOUNTER
Spoke with pt to inform her of the new appt dates which she can see in her portal. Also informed pt she can drop off her urine after she completes the 24 hr collection, at the hospital behind our office at [facility] at registration. Pt confirmed she was given instructions at discharge about the 24 hr urine collection. Understanding verbalized.

## 2024-10-23 ENCOUNTER — ANESTHESIA (OUTPATIENT)
Dept: OBSTETRICS AND GYNECOLOGY | Facility: HOSPITAL | Age: 29
End: 2024-10-23
Payer: MEDICAID

## 2024-10-23 ENCOUNTER — ANESTHESIA EVENT (OUTPATIENT)
Dept: OBSTETRICS AND GYNECOLOGY | Facility: HOSPITAL | Age: 29
End: 2024-10-23
Payer: MEDICAID

## 2024-10-23 ENCOUNTER — HOSPITAL ENCOUNTER (INPATIENT)
Facility: HOSPITAL | Age: 29
LOS: 3 days | Discharge: HOME | End: 2024-10-26
Attending: OBSTETRICS & GYNECOLOGY | Admitting: OBSTETRICS & GYNECOLOGY
Payer: MEDICAID

## 2024-10-23 DIAGNOSIS — Z34.90 PREGNANCY: ICD-10-CM

## 2024-10-23 DIAGNOSIS — Z98.891 S/P CESAREAN SECTION: ICD-10-CM

## 2024-10-23 DIAGNOSIS — Z98.891 HISTORY OF 2 CESAREAN SECTIONS: ICD-10-CM

## 2024-10-23 DIAGNOSIS — Z90.79 STATUS POST BILATERAL SALPINGECTOMY: ICD-10-CM

## 2024-10-23 PROBLEM — O46.93 THIRD TRIMESTER BLEEDING: Status: RESOLVED | Noted: 2024-10-21 | Resolved: 2024-10-23

## 2024-10-23 PROBLEM — Z3A.31 31 WEEKS GESTATION OF PREGNANCY: Status: RESOLVED | Noted: 2024-09-24 | Resolved: 2024-10-23

## 2024-10-23 LAB
ABO + RH BLD: NORMAL
AMPHET+METHAMPHET UR QL: NEGATIVE
BARBITURATES UR QL SCN>200 NG/ML: NEGATIVE
BASOPHILS # BLD AUTO: 0.03 K/UL (ref 0–0.2)
BASOPHILS NFR BLD: 0.3 % (ref 0–1.9)
BENZODIAZ UR QL SCN>200 NG/ML: NEGATIVE
BLD GP AB SCN CELLS X3 SERPL QL: NORMAL
BUPRENORPHINE UR QL: NEGATIVE
BZE UR QL SCN: NEGATIVE
CANNABINOIDS UR QL SCN: NEGATIVE
CREAT UR-MCNC: 68.4 MG/DL (ref 15–325)
CTP QC/QA: YES
DIFFERENTIAL METHOD BLD: ABNORMAL
EOSINOPHIL # BLD AUTO: 0.1 K/UL (ref 0–0.5)
EOSINOPHIL NFR BLD: 0.6 % (ref 0–8)
ERYTHROCYTE [DISTWIDTH] IN BLOOD BY AUTOMATED COUNT: 14.1 % (ref 11.5–14.5)
FENTANYL UR QL SCN: NORMAL
HCT VFR BLD AUTO: 35.2 % (ref 37–48.5)
HGB BLD-MCNC: 11.3 G/DL (ref 12–16)
IMM GRANULOCYTES # BLD AUTO: 0.05 K/UL (ref 0–0.04)
IMM GRANULOCYTES NFR BLD AUTO: 0.5 % (ref 0–0.5)
LYMPHOCYTES # BLD AUTO: 2.1 K/UL (ref 1–4.8)
LYMPHOCYTES NFR BLD: 20.6 % (ref 18–48)
MCH RBC QN AUTO: 26.7 PG (ref 27–31)
MCHC RBC AUTO-ENTMCNC: 32.1 G/DL (ref 32–36)
MCV RBC AUTO: 83 FL (ref 82–98)
MONOCYTES # BLD AUTO: 0.7 K/UL (ref 0.3–1)
MONOCYTES NFR BLD: 7.1 % (ref 4–15)
NEUTROPHILS # BLD AUTO: 7.2 K/UL (ref 1.8–7.7)
NEUTROPHILS NFR BLD: 70.9 % (ref 38–73)
NRBC BLD-RTO: 0 /100 WBC
OPIATES UR QL SCN: NEGATIVE
PCP UR QL SCN>25 NG/ML: NEGATIVE
PLATELET # BLD AUTO: 174 K/UL (ref 150–450)
PMV BLD AUTO: 10.5 FL (ref 9.2–12.9)
RBC # BLD AUTO: 4.24 M/UL (ref 4–5.4)
RUPTURE OF MEMBRANE: POSITIVE
TOXICOLOGY INFORMATION: NORMAL
WBC # BLD AUTO: 10.22 K/UL (ref 3.9–12.7)

## 2024-10-23 PROCEDURE — 80307 DRUG TEST PRSMV CHEM ANLYZR: CPT | Performed by: OBSTETRICS & GYNECOLOGY

## 2024-10-23 PROCEDURE — 86900 BLOOD TYPING SEROLOGIC ABO: CPT | Performed by: OBSTETRICS & GYNECOLOGY

## 2024-10-23 PROCEDURE — 86850 RBC ANTIBODY SCREEN: CPT | Performed by: OBSTETRICS & GYNECOLOGY

## 2024-10-23 PROCEDURE — 59514 CESAREAN DELIVERY ONLY: CPT | Mod: AT,,, | Performed by: OBSTETRICS & GYNECOLOGY

## 2024-10-23 PROCEDURE — 37000008 HC ANESTHESIA 1ST 15 MINUTES: Performed by: OBSTETRICS & GYNECOLOGY

## 2024-10-23 PROCEDURE — 86593 SYPHILIS TEST NON-TREP QUANT: CPT | Performed by: OBSTETRICS & GYNECOLOGY

## 2024-10-23 PROCEDURE — 86901 BLOOD TYPING SEROLOGIC RH(D): CPT | Performed by: OBSTETRICS & GYNECOLOGY

## 2024-10-23 PROCEDURE — 36004725 HC OB OR TIME LEV III - EA ADD 15 MIN: Mod: SZN

## 2024-10-23 PROCEDURE — 58611 LIGATE OVIDUCT(S) ADD-ON: CPT | Mod: ,,, | Performed by: OBSTETRICS & GYNECOLOGY

## 2024-10-23 PROCEDURE — 63600175 PHARM REV CODE 636 W HCPCS: Performed by: NURSE ANESTHETIST, CERTIFIED REGISTERED

## 2024-10-23 PROCEDURE — 85025 COMPLETE CBC W/AUTO DIFF WBC: CPT | Performed by: OBSTETRICS & GYNECOLOGY

## 2024-10-23 PROCEDURE — 80307 DRUG TEST PRSMV CHEM ANLYZR: CPT | Mod: 91 | Performed by: OBSTETRICS & GYNECOLOGY

## 2024-10-23 PROCEDURE — 12000002 HC ACUTE/MED SURGE SEMI-PRIVATE ROOM

## 2024-10-23 PROCEDURE — 36004724 HC OB OR TIME LEV III - 1ST 15 MIN: Performed by: OBSTETRICS & GYNECOLOGY

## 2024-10-23 PROCEDURE — 37000009 HC ANESTHESIA EA ADD 15 MINS: Mod: SZN

## 2024-10-23 PROCEDURE — 71000033 HC RECOVERY, INTIAL HOUR: Performed by: OBSTETRICS & GYNECOLOGY

## 2024-10-23 PROCEDURE — 37000009 HC ANESTHESIA EA ADD 15 MINS: Performed by: OBSTETRICS & GYNECOLOGY

## 2024-10-23 PROCEDURE — 71000039 HC RECOVERY, EACH ADD'L HOUR: Performed by: OBSTETRICS & GYNECOLOGY

## 2024-10-23 PROCEDURE — 36004725 HC OB OR TIME LEV III - EA ADD 15 MIN: Performed by: OBSTETRICS & GYNECOLOGY

## 2024-10-23 PROCEDURE — 0UB70ZZ EXCISION OF BILATERAL FALLOPIAN TUBES, OPEN APPROACH: ICD-10-PCS | Performed by: OBSTETRICS & GYNECOLOGY

## 2024-10-23 RX ORDER — OXYTOCIN 10 [USP'U]/ML
10 INJECTION, SOLUTION INTRAMUSCULAR; INTRAVENOUS ONCE AS NEEDED
Status: DISCONTINUED | OUTPATIENT
Start: 2024-10-23 | End: 2024-10-26 | Stop reason: HOSPADM

## 2024-10-23 RX ORDER — SIMETHICONE 80 MG
1 TABLET,CHEWABLE ORAL EVERY 6 HOURS PRN
Status: DISCONTINUED | OUTPATIENT
Start: 2024-10-23 | End: 2024-10-26 | Stop reason: HOSPADM

## 2024-10-23 RX ORDER — NALOXONE HCL 0.4 MG/ML
0.02 VIAL (ML) INJECTION ONCE AS NEEDED
Status: DISCONTINUED | OUTPATIENT
Start: 2024-10-24 | End: 2024-10-26 | Stop reason: HOSPADM

## 2024-10-23 RX ORDER — AMOXICILLIN 250 MG
1 CAPSULE ORAL NIGHTLY PRN
Status: DISCONTINUED | OUTPATIENT
Start: 2024-10-23 | End: 2024-10-26 | Stop reason: HOSPADM

## 2024-10-23 RX ORDER — OXYTOCIN-SODIUM CHLORIDE 0.9% IV SOLN 30 UNIT/500ML 30-0.9/5 UT/ML-%
95 SOLUTION INTRAVENOUS ONCE AS NEEDED
Status: DISCONTINUED | OUTPATIENT
Start: 2024-10-23 | End: 2024-10-26 | Stop reason: HOSPADM

## 2024-10-23 RX ORDER — FENTANYL CITRATE 50 UG/ML
INJECTION, SOLUTION INTRAMUSCULAR; INTRAVENOUS
Status: DISCONTINUED | OUTPATIENT
Start: 2024-10-23 | End: 2024-10-23

## 2024-10-23 RX ORDER — OXYTOCIN-SODIUM CHLORIDE 0.9% IV SOLN 30 UNIT/500ML 30-0.9/5 UT/ML-%
30 SOLUTION INTRAVENOUS ONCE AS NEEDED
Status: DISCONTINUED | OUTPATIENT
Start: 2024-10-23 | End: 2024-10-26 | Stop reason: HOSPADM

## 2024-10-23 RX ORDER — MUPIROCIN 20 MG/G
OINTMENT TOPICAL
Status: DISCONTINUED | OUTPATIENT
Start: 2024-10-23 | End: 2024-10-24 | Stop reason: HOSPADM

## 2024-10-23 RX ORDER — DOCUSATE SODIUM 100 MG/1
200 CAPSULE, LIQUID FILLED ORAL 2 TIMES DAILY
Status: DISCONTINUED | OUTPATIENT
Start: 2024-10-23 | End: 2024-10-26 | Stop reason: HOSPADM

## 2024-10-23 RX ORDER — OXYTOCIN-SODIUM CHLORIDE 0.9% IV SOLN 30 UNIT/500ML 30-0.9/5 UT/ML-%
10 SOLUTION INTRAVENOUS ONCE AS NEEDED
Status: DISCONTINUED | OUTPATIENT
Start: 2024-10-23 | End: 2024-10-26 | Stop reason: HOSPADM

## 2024-10-23 RX ORDER — PRENATAL WITH FERROUS FUM AND FOLIC ACID 3080; 920; 120; 400; 22; 1.84; 3; 20; 10; 1; 12; 200; 27; 25; 2 [IU]/1; [IU]/1; MG/1; [IU]/1; MG/1; MG/1; MG/1; MG/1; MG/1; MG/1; UG/1; MG/1; MG/1; MG/1; MG/1
1 TABLET ORAL DAILY
Status: DISCONTINUED | OUTPATIENT
Start: 2024-10-24 | End: 2024-10-26 | Stop reason: HOSPADM

## 2024-10-23 RX ORDER — ONDANSETRON 4 MG/1
8 TABLET, ORALLY DISINTEGRATING ORAL EVERY 8 HOURS PRN
Status: DISCONTINUED | OUTPATIENT
Start: 2024-10-23 | End: 2024-10-26 | Stop reason: HOSPADM

## 2024-10-23 RX ORDER — OXYTOCIN-SODIUM CHLORIDE 0.9% IV SOLN 30 UNIT/500ML 30-0.9/5 UT/ML-%
95 SOLUTION INTRAVENOUS CONTINUOUS PRN
Status: DISCONTINUED | OUTPATIENT
Start: 2024-10-23 | End: 2024-10-26 | Stop reason: HOSPADM

## 2024-10-23 RX ORDER — ADHESIVE BANDAGE
30 BANDAGE TOPICAL 2 TIMES DAILY PRN
Status: DISCONTINUED | OUTPATIENT
Start: 2024-10-24 | End: 2024-10-26 | Stop reason: HOSPADM

## 2024-10-23 RX ORDER — SODIUM CHLORIDE, SODIUM LACTATE, POTASSIUM CHLORIDE, CALCIUM CHLORIDE 600; 310; 30; 20 MG/100ML; MG/100ML; MG/100ML; MG/100ML
INJECTION, SOLUTION INTRAVENOUS CONTINUOUS
Status: DISCONTINUED | OUTPATIENT
Start: 2024-10-23 | End: 2024-10-24

## 2024-10-23 RX ORDER — ONDANSETRON HYDROCHLORIDE 2 MG/ML
INJECTION, SOLUTION INTRAVENOUS
Status: DISCONTINUED | OUTPATIENT
Start: 2024-10-23 | End: 2024-10-23

## 2024-10-23 RX ORDER — PHENYLEPHRINE HYDROCHLORIDE 10 MG/ML
INJECTION INTRAVENOUS
Status: DISCONTINUED | OUTPATIENT
Start: 2024-10-23 | End: 2024-10-23

## 2024-10-23 RX ORDER — TRANEXAMIC ACID 10 MG/ML
1000 INJECTION, SOLUTION INTRAVENOUS EVERY 30 MIN PRN
Status: DISCONTINUED | OUTPATIENT
Start: 2024-10-23 | End: 2024-10-26 | Stop reason: HOSPADM

## 2024-10-23 RX ORDER — OXYCODONE AND ACETAMINOPHEN 10; 325 MG/1; MG/1
1 TABLET ORAL EVERY 4 HOURS PRN
Status: DISCONTINUED | OUTPATIENT
Start: 2024-10-23 | End: 2024-10-26 | Stop reason: HOSPADM

## 2024-10-23 RX ORDER — SODIUM CHLORIDE 0.9 % (FLUSH) 0.9 %
10 SYRINGE (ML) INJECTION
Status: DISCONTINUED | OUTPATIENT
Start: 2024-10-23 | End: 2024-10-26 | Stop reason: HOSPADM

## 2024-10-23 RX ORDER — DIPHENHYDRAMINE HYDROCHLORIDE 50 MG/ML
12.5 INJECTION INTRAMUSCULAR; INTRAVENOUS EVERY 4 HOURS PRN
Status: DISCONTINUED | OUTPATIENT
Start: 2024-10-23 | End: 2024-10-26 | Stop reason: HOSPADM

## 2024-10-23 RX ORDER — HYDROMORPHONE HCL IN 0.9% NACL 6 MG/30 ML
PATIENT CONTROLLED ANALGESIA SYRINGE INTRAVENOUS CONTINUOUS
Status: DISCONTINUED | OUTPATIENT
Start: 2024-10-24 | End: 2024-10-24

## 2024-10-23 RX ORDER — MUPIROCIN 20 MG/G
OINTMENT TOPICAL 2 TIMES DAILY
Status: DISCONTINUED | OUTPATIENT
Start: 2024-10-23 | End: 2024-10-25

## 2024-10-23 RX ORDER — CARBOPROST TROMETHAMINE 250 UG/ML
250 INJECTION, SOLUTION INTRAMUSCULAR
Status: DISCONTINUED | OUTPATIENT
Start: 2024-10-23 | End: 2024-10-26 | Stop reason: HOSPADM

## 2024-10-23 RX ORDER — DIPHENHYDRAMINE HCL 25 MG
25 CAPSULE ORAL EVERY 4 HOURS PRN
Status: DISCONTINUED | OUTPATIENT
Start: 2024-10-23 | End: 2024-10-26 | Stop reason: HOSPADM

## 2024-10-23 RX ORDER — CEFAZOLIN SODIUM 2 G/50ML
2 SOLUTION INTRAVENOUS
Status: DISCONTINUED | OUTPATIENT
Start: 2024-10-23 | End: 2024-10-26 | Stop reason: HOSPADM

## 2024-10-23 RX ORDER — BISACODYL 10 MG/1
10 SUPPOSITORY RECTAL ONCE AS NEEDED
Status: DISCONTINUED | OUTPATIENT
Start: 2024-10-23 | End: 2024-10-26 | Stop reason: HOSPADM

## 2024-10-23 RX ORDER — ONDANSETRON HYDROCHLORIDE 2 MG/ML
4 INJECTION, SOLUTION INTRAVENOUS EVERY 6 HOURS PRN
Status: DISCONTINUED | OUTPATIENT
Start: 2024-10-24 | End: 2024-10-26 | Stop reason: HOSPADM

## 2024-10-23 RX ORDER — SODIUM CHLORIDE, SODIUM LACTATE, POTASSIUM CHLORIDE, CALCIUM CHLORIDE 600; 310; 30; 20 MG/100ML; MG/100ML; MG/100ML; MG/100ML
INJECTION, SOLUTION INTRAVENOUS CONTINUOUS PRN
Status: DISCONTINUED | OUTPATIENT
Start: 2024-10-23 | End: 2024-10-23

## 2024-10-23 RX ORDER — DIPHENOXYLATE HYDROCHLORIDE AND ATROPINE SULFATE 2.5; .025 MG/1; MG/1
2 TABLET ORAL EVERY 6 HOURS PRN
Status: DISCONTINUED | OUTPATIENT
Start: 2024-10-23 | End: 2024-10-26 | Stop reason: HOSPADM

## 2024-10-23 RX ORDER — OXYTOCIN-SODIUM CHLORIDE 0.9% IV SOLN 30 UNIT/500ML 30-0.9/5 UT/ML-%
SOLUTION INTRAVENOUS
Status: DISCONTINUED | OUTPATIENT
Start: 2024-10-23 | End: 2024-10-23

## 2024-10-23 RX ORDER — OXYCODONE AND ACETAMINOPHEN 5; 325 MG/1; MG/1
1 TABLET ORAL EVERY 4 HOURS PRN
Status: DISCONTINUED | OUTPATIENT
Start: 2024-10-23 | End: 2024-10-26 | Stop reason: HOSPADM

## 2024-10-23 RX ORDER — METHYLERGONOVINE MALEATE 0.2 MG/ML
200 INJECTION INTRAVENOUS ONCE AS NEEDED
Status: DISCONTINUED | OUTPATIENT
Start: 2024-10-23 | End: 2024-10-26 | Stop reason: HOSPADM

## 2024-10-23 RX ADMIN — FENTANYL CITRATE 100 MCG: 50 INJECTION, SOLUTION INTRAMUSCULAR; INTRAVENOUS at 10:10

## 2024-10-23 RX ADMIN — SODIUM CHLORIDE, SODIUM LACTATE, POTASSIUM CHLORIDE, AND CALCIUM CHLORIDE: .6; .31; .03; .02 INJECTION, SOLUTION INTRAVENOUS at 09:10

## 2024-10-23 RX ADMIN — FENTANYL CITRATE 100 MCG: 50 INJECTION, SOLUTION INTRAMUSCULAR; INTRAVENOUS at 09:10

## 2024-10-23 RX ADMIN — Medication 30 UNITS: at 10:10

## 2024-10-23 RX ADMIN — PHENYLEPHRINE HYDROCHLORIDE 200 MCG: 10 INJECTION INTRAVENOUS at 09:10

## 2024-10-23 RX ADMIN — ONDANSETRON 4 MG: 2 INJECTION INTRAMUSCULAR; INTRAVENOUS at 09:10

## 2024-10-23 RX ADMIN — PHENYLEPHRINE HYDROCHLORIDE 100 MCG: 10 INJECTION INTRAVENOUS at 09:10

## 2024-10-24 PROBLEM — Z98.891 S/P CESAREAN SECTION: Status: ACTIVE | Noted: 2024-10-24

## 2024-10-24 PROBLEM — Z90.79 STATUS POST BILATERAL SALPINGECTOMY: Status: ACTIVE | Noted: 2024-10-24

## 2024-10-24 LAB
BASOPHILS # BLD AUTO: 0.05 K/UL (ref 0–0.2)
BASOPHILS NFR BLD: 0.4 % (ref 0–1.9)
DIFFERENTIAL METHOD BLD: ABNORMAL
EOSINOPHIL # BLD AUTO: 0.1 K/UL (ref 0–0.5)
EOSINOPHIL NFR BLD: 0.4 % (ref 0–8)
ERYTHROCYTE [DISTWIDTH] IN BLOOD BY AUTOMATED COUNT: 14.1 % (ref 11.5–14.5)
HCT VFR BLD AUTO: 32.7 % (ref 37–48.5)
HGB BLD-MCNC: 10.1 G/DL (ref 12–16)
IMM GRANULOCYTES # BLD AUTO: 0.07 K/UL (ref 0–0.04)
IMM GRANULOCYTES NFR BLD AUTO: 0.5 % (ref 0–0.5)
LYMPHOCYTES # BLD AUTO: 2 K/UL (ref 1–4.8)
LYMPHOCYTES NFR BLD: 14.4 % (ref 18–48)
MCH RBC QN AUTO: 26.8 PG (ref 27–31)
MCHC RBC AUTO-ENTMCNC: 30.9 G/DL (ref 32–36)
MCV RBC AUTO: 87 FL (ref 82–98)
MONOCYTES # BLD AUTO: 0.9 K/UL (ref 0.3–1)
MONOCYTES NFR BLD: 6.4 % (ref 4–15)
NEUTROPHILS # BLD AUTO: 10.8 K/UL (ref 1.8–7.7)
NEUTROPHILS NFR BLD: 77.9 % (ref 38–73)
NRBC BLD-RTO: 0 /100 WBC
PLATELET # BLD AUTO: 144 K/UL (ref 150–450)
PMV BLD AUTO: 10.6 FL (ref 9.2–12.9)
RBC # BLD AUTO: 3.77 M/UL (ref 4–5.4)
TREPONEMA PALLIDUM IGG+IGM AB [PRESENCE] IN SERUM OR PLASMA BY IMMUNOASSAY: NONREACTIVE
WBC # BLD AUTO: 13.79 K/UL (ref 3.9–12.7)

## 2024-10-24 PROCEDURE — 36415 COLL VENOUS BLD VENIPUNCTURE: CPT | Performed by: OBSTETRICS & GYNECOLOGY

## 2024-10-24 PROCEDURE — 99231 SBSQ HOSP IP/OBS SF/LOW 25: CPT | Mod: ,,, | Performed by: STUDENT IN AN ORGANIZED HEALTH CARE EDUCATION/TRAINING PROGRAM

## 2024-10-24 PROCEDURE — 12000002 HC ACUTE/MED SURGE SEMI-PRIVATE ROOM

## 2024-10-24 PROCEDURE — 25000003 PHARM REV CODE 250: Performed by: OBSTETRICS & GYNECOLOGY

## 2024-10-24 PROCEDURE — 85025 COMPLETE CBC W/AUTO DIFF WBC: CPT | Performed by: OBSTETRICS & GYNECOLOGY

## 2024-10-24 PROCEDURE — 51702 INSERT TEMP BLADDER CATH: CPT

## 2024-10-24 RX ADMIN — IBUPROFEN 600 MG: 200 TABLET, FILM COATED ORAL at 05:10

## 2024-10-24 RX ADMIN — PRENATAL VIT W/ FE FUMARATE-FA TAB 27-0.8 MG 1 TABLET: 27-0.8 TAB at 08:10

## 2024-10-24 RX ADMIN — OXYCODONE HYDROCHLORIDE AND ACETAMINOPHEN 1 TABLET: 10; 325 TABLET ORAL at 05:10

## 2024-10-24 RX ADMIN — MUPIROCIN 1 G: 20 OINTMENT TOPICAL at 08:10

## 2024-10-24 RX ADMIN — DOCUSATE SODIUM 200 MG: 100 CAPSULE, LIQUID FILLED ORAL at 08:10

## 2024-10-24 RX ADMIN — OXYCODONE HYDROCHLORIDE AND ACETAMINOPHEN 1 TABLET: 10; 325 TABLET ORAL at 11:10

## 2024-10-24 RX ADMIN — IBUPROFEN 600 MG: 200 TABLET, FILM COATED ORAL at 11:10

## 2024-10-24 RX ADMIN — SIMETHICONE 80 MG: 80 TABLET, CHEWABLE ORAL at 06:10

## 2024-10-24 RX ADMIN — OXYCODONE HYDROCHLORIDE AND ACETAMINOPHEN 1 TABLET: 10; 325 TABLET ORAL at 06:10

## 2024-10-24 RX ADMIN — OXYCODONE HYDROCHLORIDE AND ACETAMINOPHEN 1 TABLET: 5; 325 TABLET ORAL at 01:10

## 2024-10-24 RX ADMIN — MAGNESIUM HYDROXIDE 2400 MG: 400 SUSPENSION ORAL at 06:10

## 2024-10-24 RX ADMIN — IBUPROFEN 600 MG: 200 TABLET, FILM COATED ORAL at 06:10

## 2024-10-25 PROCEDURE — 25000003 PHARM REV CODE 250: Performed by: OBSTETRICS & GYNECOLOGY

## 2024-10-25 PROCEDURE — 12000002 HC ACUTE/MED SURGE SEMI-PRIVATE ROOM

## 2024-10-25 PROCEDURE — 99231 SBSQ HOSP IP/OBS SF/LOW 25: CPT | Mod: ,,, | Performed by: OBSTETRICS & GYNECOLOGY

## 2024-10-25 RX ORDER — OXYCODONE AND ACETAMINOPHEN 5; 325 MG/1; MG/1
1 TABLET ORAL EVERY 4 HOURS PRN
Qty: 18 TABLET | Refills: 0 | Status: SHIPPED | OUTPATIENT
Start: 2024-10-25

## 2024-10-25 RX ORDER — IBUPROFEN 600 MG/1
600 TABLET ORAL 3 TIMES DAILY
Qty: 40 TABLET | Refills: 1 | Status: SHIPPED | OUTPATIENT
Start: 2024-10-25

## 2024-10-25 RX ADMIN — DOCUSATE SODIUM 200 MG: 100 CAPSULE, LIQUID FILLED ORAL at 08:10

## 2024-10-25 RX ADMIN — SIMETHICONE 80 MG: 80 TABLET, CHEWABLE ORAL at 08:10

## 2024-10-25 RX ADMIN — OXYCODONE HYDROCHLORIDE AND ACETAMINOPHEN 1 TABLET: 10; 325 TABLET ORAL at 08:10

## 2024-10-25 RX ADMIN — PRENATAL VIT W/ FE FUMARATE-FA TAB 27-0.8 MG 1 TABLET: 27-0.8 TAB at 08:10

## 2024-10-25 RX ADMIN — IBUPROFEN 600 MG: 200 TABLET, FILM COATED ORAL at 05:10

## 2024-10-25 RX ADMIN — IBUPROFEN 600 MG: 200 TABLET, FILM COATED ORAL at 11:10

## 2024-10-25 RX ADMIN — OXYCODONE HYDROCHLORIDE AND ACETAMINOPHEN 1 TABLET: 10; 325 TABLET ORAL at 03:10

## 2024-10-25 RX ADMIN — OXYCODONE HYDROCHLORIDE AND ACETAMINOPHEN 1 TABLET: 5; 325 TABLET ORAL at 11:10

## 2024-10-25 RX ADMIN — SIMETHICONE 80 MG: 80 TABLET, CHEWABLE ORAL at 12:10

## 2024-10-26 ENCOUNTER — PATIENT MESSAGE (OUTPATIENT)
Dept: OBSTETRICS AND GYNECOLOGY | Facility: HOSPITAL | Age: 29
End: 2024-10-26

## 2024-10-26 VITALS
BODY MASS INDEX: 32.95 KG/M2 | DIASTOLIC BLOOD PRESSURE: 85 MMHG | HEIGHT: 66 IN | TEMPERATURE: 98 F | RESPIRATION RATE: 18 BRPM | OXYGEN SATURATION: 98 % | HEART RATE: 88 BPM | WEIGHT: 205 LBS | SYSTOLIC BLOOD PRESSURE: 125 MMHG

## 2024-10-26 PROCEDURE — 25000003 PHARM REV CODE 250: Performed by: OBSTETRICS & GYNECOLOGY

## 2024-10-26 PROCEDURE — 99238 HOSP IP/OBS DSCHRG MGMT 30/<: CPT | Mod: ,,, | Performed by: OBSTETRICS & GYNECOLOGY

## 2024-10-26 RX ADMIN — IBUPROFEN 600 MG: 200 TABLET, FILM COATED ORAL at 12:10

## 2024-10-26 RX ADMIN — PRENATAL VIT W/ FE FUMARATE-FA TAB 27-0.8 MG 1 TABLET: 27-0.8 TAB at 08:10

## 2024-10-26 RX ADMIN — DOCUSATE SODIUM 200 MG: 100 CAPSULE, LIQUID FILLED ORAL at 08:10

## 2024-10-26 RX ADMIN — IBUPROFEN 600 MG: 200 TABLET, FILM COATED ORAL at 05:10

## 2024-11-07 ENCOUNTER — POSTPARTUM VISIT (OUTPATIENT)
Dept: OBSTETRICS AND GYNECOLOGY | Facility: CLINIC | Age: 29
End: 2024-11-07
Payer: MEDICAID

## 2024-11-07 VITALS
SYSTOLIC BLOOD PRESSURE: 132 MMHG | DIASTOLIC BLOOD PRESSURE: 70 MMHG | HEIGHT: 66 IN | WEIGHT: 186.5 LBS | BODY MASS INDEX: 29.97 KG/M2

## 2024-11-07 PROCEDURE — 99999 PR PBB SHADOW E&M-EST. PATIENT-LVL II: CPT | Mod: PBBFAC,,, | Performed by: OBSTETRICS & GYNECOLOGY

## 2024-11-07 PROCEDURE — 99212 OFFICE O/P EST SF 10 MIN: CPT | Mod: PBBFAC,PO | Performed by: OBSTETRICS & GYNECOLOGY

## 2024-11-07 NOTE — PROGRESS NOTES
Prenatal Note   Chief Complaint:  Postpartum Care (Incision check)       Patient ID: Vira Ybarra is a  29 y.o. female.    Date: 2024    Postpartum Office Note    The patient presents for postpartum evaluation.  She is status post a repeat  section with bilateral tubal ligation (salpingectomy) on 10/23/2024.   She is currently without complaint.   She denies any depression/mood complaints.       No abnormal bleeding.    No pain.  No longer using Percocet.    Breast feeding.  No breastfeeding issues or breast engorgement issues.      Delivery Information     Section Operative Report     Hospital:  [facility]  10/23/2024     SURGEON: Cameron Stringer M.D., F.A.C.O.G.      ASSISTANT: Silvana Quiñonez CFA     PREOPERATIVE DIAGNOSIS:   1. Estimated Date of Delivery: 24, 36w0d intrauterine pregnancy  2.  Premature rupture of membranes  3. History of previous  section   4. Undesired fertility     POSTOPERATIVE DIAGNOSIS:      1. Intrauterine pregnancy at 36-0 sevenths weeks   2. History of previous  section   3.  Premature rupture of membranes  4. Undesired fertility     OPERATION:   Repeat Low Transverse  Section  2.  Bilateral tubal ligation: Bilateral salpingectomy     ANESTHESIA: Spinal / Epidural     ESTIMATED BLOOD LOSS: see QRL     URINE OUTPUT: 200 cc     Fluids: 1300 cc of Crystalloid     FINDINGS:   Small left fundal uterine myoma otherwise grossly normal gravid maternal female anatomy.   Vigorous / viable Male infant delivered @ 2159 over a low transverse hysterotomy incision.  Apgar Score:  7 at 1 minute, 9 at 5 minute.   Weight: 3800 g (8 lb 6 oz)  The placenta was allowed to spontaneous deliver.  The placenta appeared grossly intact.       Review of Systems   Constitutional:  Negative for fever.   Eyes:  Negative for blurred vision.   Respiratory:  Negative for shortness of breath.    Cardiovascular:   "Negative for chest pain.   Gastrointestinal:  Negative for abdominal pain and heartburn.   Genitourinary:  Negative for dysuria.   Neurological:  Negative for headaches.        Physical Exam:   /70 (BP Location: Left arm, Patient Position: Sitting)   Ht 5' 6" (1.676 m)   Wt 84.6 kg (186 lb 8.2 oz)   LMP 2024   Breastfeeding Yes   BMI 30.10 kg/m²     Physical Exam:   Constitutional: She appears well-developed and well-nourished.       Cardiovascular:  Normal rate.             Pulmonary/Chest: Effort normal.        Abdominal: Soft. There is no abdominal tenderness. There is no guarding.   The  section incision appears to be healing well.  No erythema, drainage signs of infection or other postoperative issues noted.             Musculoskeletal: No edema.       Neurological: She is alert.    Skin: Skin is warm and dry.    Psychiatric: She has a normal mood and affect.        PATHOLOGY:  1. RIGHT FALLOPIAN TUBE:   - COMPLETE CROSS SECTION FROM FIMBRIATED SEGMENT OF FALLOPIAN TUBE WITH NO HISTOPATHOLOGIC ABNORMALITY.   2. LEFT FALLOPIAN TUBE:   - COMPLETE CROSS SECTION FROM FIMBRIATED SEGMENT OF FALLOPIAN TUBE WITH NO HISTOPATHOLOGIC ABNORMALITY.     Assessment:  S/P:  Repeat low transverse  section and postpartum tubal ligation (bilateral salpingectomy)     Plan:    The above was reviewed discussed with patient and significant other.    At this time the patient is currently doing well and without complaints.     Pathology confirming bilateral salpingectomy discussed.    From a postpartum postoperative standpoint the patient is currently doing well without complaints.  Routine postpartum postoperative care at this time.    The patient's questions regarding the above were answered and she is in agreement with the current plan.    Cameron Stringer MD  Department OBGYN  [facility]   OB PROBLEM LIST:  History of term term stillborn    History of  section x 2  Depression/anxiety: " Currently on Lexapro and BuSpar    She is scheduled for a REPEAT  SECTION AND OTHER INDICATED PROCEDURES on 2024 (38-6/7 weeks)   FINAL EDC:    2024 by US done 2024      SO Name: Sushil Ybarra ANATOMY SCAN:  US [facility] 2024  Indication: Anemia complicating pregnancy  Indication: Fetal Anatomic Survey  Indication: Stillbirth, Prior    Impression: No fetal structural malformations are identified; however, fetal imaging is incomplete today.  A follow-up study will be scheduled to complete the fetal anatomic survey.  Fetal size today is consistent with established gestational age.  Cervical length by TA scanning is normal.  Placental location is posterior without evidence of previa.      INITIAL LABS:  Date: 5/3/2024  Type and screen: A (+) / Negative  RPR: Negative   Rubella: Immune   Hepatitis panel:  Negative   HIV: Negative   Urine culture: No Growth   CBC: 7.2 > 13.9 / 41.6 < 213  Varicella: Immune   Hemoglobin electrophoresis: No Abnormalities     Ferritin: 129     10-12 WEEK LABS:    PAP: PAP neg / Date:  2023    AGUSTINA/CHLAM: Negative / Negative    cfDNA ([id number]):  Low risk.  XY    CARRIER SCREEN (Inherit[employer] Core):  Pending   28 WEEK LABS:    CBC:   1Hr OGTT:  Ferritin:      OTHER LABS:  GBS: ___   OTHER ULTRASOUNDS:  US [facility] 2024  Indication: Follow-up Consult:  Indication: Stillbirth, Prior  Indication: Assess Fetal Growth  Indication:  Screening - Follow-Up Anatomy    Impression: No fetal structural malformations are identified today; however, the fetal anatomic survey remains incomplete, as noted in the 'fetal anatomy' section of this report.  If further ultrasound exams are planned for other indications, will attempt completion of the anatomic survey at that time. No other routine f/u exams to attempt completion  of the anatomic survey will be scheduled by [facility].  Fetal size is AGA, and the AFV is normal.    [facility] 2024  Indication: Follow-up  Consult:  Indication: Assess Fetal Growth  Indication: Stillbirth, Prior    Impression: Fetal size is AGA with the EFW at the 63% and the AC at the >99%. The EFW is 2467 g and majority of biometry measures 3 weeks ahead of gestational age.  A limited repeat fetal anatomic survey shows no abnormalities of the structures that were adequately imaged.  AFV is normal.  BPP    TO-DO THROUGHOUT PREGNANCY:    Depression Screen (20wks):  2024  TDAP (27-36wks):  2024    Birth Plan: ___  Plans to breastfeed: ___  Plans for epidural: ___  Classes at hospital: ___    Postpartum contraception: ___  Consent for delivery: 2024  Circumcision consent: 2024  BTL counseling: ___   MEDICATIONS:    Prenatal vitamins   Vitamin B6 ALLERGIES:    Iodine (rash in wheezing)    MEDICAL HISTORY:    History gestational hypertension  Asthma   Depression/anxiety/PTSD    Pre-pregnancy BMI = 32 SURGICAL HISTORY:     section   Dilation and curettage    SOCIAL HISTORY:    Smoker: non-smoker  Alcohol: yes but not since +HCG  Drugs: denies  Relationship:  3/21/2024  Domestic Violence: no  Lives with:    Education Level: Some College  Occupation: homemaker  [employer]:  [employer]  Other:   GYN HISTORY:    PAP'S:  Reported history of abnormal Pap smear but abnormality not recalled  STI'S: no past history  GENITAL HSV: no FAMILY HISTORY:    HTN: Yes - mother and father  DIABETES: Yes - father  BLEEDING D/O: Yes - mother  CLOTTING D/O: Yes - father  BIRTH DEFECTS: No  MENTAL DISABILITY: No  TWINS OR MORE: No  GYN CANCER: Yes - mother  Other:     OBSTETRIC HISTORY   Month/Year Mode of Delivery EGA Wt. 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  No history of diabetes   2017  section 40  Female Failed induction of labor   2015  section 26  Female Preeclampsia        First-trimester SAB x4             Plan:      Postpartum  care following  delivery      Follow up in about 4 weeks (around 2024) for Routine PP OB F/U or as needed.     Cameron Stringer MD  Department OBGYN  [facility]

## 2024-12-05 ENCOUNTER — POSTPARTUM VISIT (OUTPATIENT)
Dept: OBSTETRICS AND GYNECOLOGY | Facility: CLINIC | Age: 29
End: 2024-12-05
Payer: MEDICAID

## 2024-12-05 VITALS
BODY MASS INDEX: 30.11 KG/M2 | HEIGHT: 66 IN | RESPIRATION RATE: 18 BRPM | SYSTOLIC BLOOD PRESSURE: 122 MMHG | WEIGHT: 187.38 LBS | DIASTOLIC BLOOD PRESSURE: 84 MMHG | HEART RATE: 96 BPM

## 2024-12-05 PROCEDURE — 99213 OFFICE O/P EST LOW 20 MIN: CPT | Mod: PBBFAC,PO | Performed by: OBSTETRICS & GYNECOLOGY

## 2024-12-05 PROCEDURE — 99999 PR PBB SHADOW E&M-EST. PATIENT-LVL III: CPT | Mod: PBBFAC,,, | Performed by: OBSTETRICS & GYNECOLOGY

## 2024-12-05 NOTE — PROGRESS NOTES
Prenatal Note   Chief Complaint:  Postpartum Care (C/S 10/23)       Patient ID: Vira Ybarra is a  29 y.o. female.    Date: 2024    Postpartum Office Note    The patient presents for 6 week postpartum evaluation.    She is status post a repeat  section with bilateral tubal ligation (salpingectomy) on 10/23/2024.   She is currently without complaint.   She denies any depression/mood complaints.       No abnormal bleeding.    No pain.  No longer using Percocet.    Breast feeding.  No breastfeeding issues or breast engorgement issues.      Delivery Information     Section Operative Report     Hospital:  [facility]  10/23/2024     SURGEON: Cameron Stringer M.D., F.A.C.O.G.      ASSISTANT: Silvana Quiñonez CFA     PREOPERATIVE DIAGNOSIS:   1. Estimated Date of Delivery: 24, 36w0d intrauterine pregnancy  2.  Premature rupture of membranes  3. History of previous  section   4. Undesired fertility     POSTOPERATIVE DIAGNOSIS:      1. Intrauterine pregnancy at 36-0 sevenths weeks   2. History of previous  section   3.  Premature rupture of membranes  4. Undesired fertility     OPERATION:   Repeat Low Transverse  Section  2.  Bilateral tubal ligation: Bilateral salpingectomy     ANESTHESIA: Spinal / Epidural     ESTIMATED BLOOD LOSS: see QRL     URINE OUTPUT: 200 cc     Fluids: 1300 cc of Crystalloid     FINDINGS:   Small left fundal uterine myoma otherwise grossly normal gravid maternal female anatomy.   Vigorous / viable Male infant delivered @ 2159 over a low transverse hysterotomy incision.  Apgar Score:  7 at 1 minute, 9 at 5 minute.   Weight: 3800 g (8 lb 6 oz)  The placenta was allowed to spontaneous deliver.  The placenta appeared grossly intact.       Review of Systems   Constitutional:  Negative for fever.   Eyes:  Negative for blurred vision.   Respiratory:  Negative for shortness of breath.    Cardiovascular:   "Negative for chest pain.   Gastrointestinal:  Negative for abdominal pain and heartburn.   Genitourinary:  Negative for dysuria.   Neurological:  Negative for headaches.        Physical Exam:   /84 (BP Location: Right arm, Patient Position: Sitting)   Pulse 96   Resp 18   Ht 5' 6" (1.676 m)   Wt 85 kg (187 lb 6.3 oz)   LMP 2024   Breastfeeding Yes   BMI 30.25 kg/m²     Physical Exam:   Constitutional: She appears well-developed and well-nourished.       Cardiovascular:  Normal rate.             Pulmonary/Chest: Effort normal.        Abdominal: Soft. There is no abdominal tenderness. There is no guarding.   The  section incision is well healed.  No erythema, drainage signs of infection or other postoperative issues noted.             Musculoskeletal: No edema.       Neurological: She is alert.    Skin: Skin is warm and dry.    Psychiatric: She has a normal mood and affect.        PATHOLOGY:  1. RIGHT FALLOPIAN TUBE:   - COMPLETE CROSS SECTION FROM FIMBRIATED SEGMENT OF FALLOPIAN TUBE WITH NO HISTOPATHOLOGIC ABNORMALITY.   2. LEFT FALLOPIAN TUBE:   - COMPLETE CROSS SECTION FROM FIMBRIATED SEGMENT OF FALLOPIAN TUBE WITH NO HISTOPATHOLOGIC ABNORMALITY.     Assessment:  S/P:  Repeat low transverse  section and postpartum tubal ligation (bilateral salpingectomy)     Plan:    The above was reviewed discussed with patient and significant other.    At this time the patient is currently doing well and without complaints.   The patient was given the [place] Prenatal /  depression scale test today.  She scored: 3.  On discussion she denies any issues with postpartum blues or depression.  We once again discussed the fact that pathology confirmed bilateral salpingectomy.    From a postpartum postoperative standpoint the patient is currently doing well without complaints.    The patient's questions regarding the above were answered and she is in agreement with the current " plan.    Cameron Stringer MD  Department OBGYN  [facility]   OB PROBLEM LIST:  History of term term stillborn    History of  section x 2  Depression/anxiety: Currently on Lexapro and BuSpar    She is scheduled for a REPEAT  SECTION AND OTHER INDICATED PROCEDURES on 2024 (38-6/7 weeks)   FINAL EDC:    2024 by US done 2024      SO Name: Sushil Ybarra ANATOMY SCAN:  US [facility] 2024  Indication: Anemia complicating pregnancy  Indication: Fetal Anatomic Survey  Indication: Stillbirth, Prior    Impression: No fetal structural malformations are identified; however, fetal imaging is incomplete today.  A follow-up study will be scheduled to complete the fetal anatomic survey.  Fetal size today is consistent with established gestational age.  Cervical length by TA scanning is normal.  Placental location is posterior without evidence of previa.      INITIAL LABS:  Date: 5/3/2024  Type and screen: A (+) / Negative  RPR: Negative   Rubella: Immune   Hepatitis panel:  Negative   HIV: Negative   Urine culture: No Growth   CBC: 7.2 > 13.9 / 41.6 < 213  Varicella: Immune   Hemoglobin electrophoresis: No Abnormalities     Ferritin: 129     10-12 WEEK LABS:    PAP: PAP neg / Date:  2023    AGUSTINA/CHLAM: Negative / Negative    cfDNA ([id number]):  Low risk.  XY 28 WEEK LABS:    CBC:   1Hr OGTT:  Ferritin:      OTHER LABS:  GBS: ___   OTHER ULTRASOUNDS:  US [facility] 2024  Indication: Follow-up Consult:  Indication: Stillbirth, Prior  Indication: Assess Fetal Growth  Indication:  Screening - Follow-Up Anatomy    Impression: No fetal structural malformations are identified today; however, the fetal anatomic survey remains incomplete, as noted in the 'fetal anatomy' section of this report.  If further ultrasound exams are planned for other indications, will attempt completion of the anatomic survey at that time. No other routine f/u exams to attempt completion  of the anatomic survey  will be scheduled by [facility].  Fetal size is AGA, and the AFV is normal.    [facility] 2024  Indication: Follow-up Consult:  Indication: Assess Fetal Growth  Indication: Stillbirth, Prior    Impression: Fetal size is AGA with the EFW at the 63% and the AC at the >99%. The EFW is 2467 g and majority of biometry measures 3 weeks ahead of gestational age.  A limited repeat fetal anatomic survey shows no abnormalities of the structures that were adequately imaged.  AFV is normal.  BPP    TO-DO THROUGHOUT PREGNANCY:    Depression Screen (20wks):  2024  TDAP (27-36wks):  2024    Postpartum contraception: ___  Consent for delivery: 2024  Circumcision consent: 2024   MEDICATIONS:    Prenatal vitamins   Vitamin B6 ALLERGIES:    Iodine (rash in wheezing)    MEDICAL HISTORY:    History gestational hypertension  Asthma   Depression/anxiety/PTSD    Pre-pregnancy BMI = 32 SURGICAL HISTORY:     section   Dilation and curettage    SOCIAL HISTORY:    Smoker: non-smoker  Alcohol: yes but not since +HCG  Drugs: denies  Relationship:  3/21/2024  Domestic Violence: no  Lives with:    Education Level: Some College  Occupation: homemaker  [employer]:  [employer]  Other:   GYN HISTORY:    PAP'S:  Reported history of abnormal Pap smear but abnormality not recalled  STI'S: no past history  GENITAL HSV: no FAMILY HISTORY:    HTN: Yes - mother and father  DIABETES: Yes - father  BLEEDING D/O: Yes - mother  CLOTTING D/O: Yes - father  BIRTH DEFECTS: No  MENTAL DISABILITY: No  TWINS OR MORE: No  GYN CANCER: Yes - mother  Other:     OBSTETRIC HISTORY   Month/Year Mode of Delivery EGA Wt. 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  No history of diabetes   2017  section 40  Female Failed induction of labor   2015  section 26  Female Preeclampsia        First-trimester SAB x4             Plan:      Postpartum care  following  delivery      Follow up for as needed / for any GYN related issues.     Cameron Stringer MD  Department OBGYN  [facility]

## 2024-12-16 PROBLEM — O09.292 HISTORY OF PRE-ECLAMPSIA IN PRIOR PREGNANCY, CURRENTLY PREGNANT IN SECOND TRIMESTER: Status: RESOLVED | Noted: 2024-06-26 | Resolved: 2024-12-16

## 2024-12-16 PROBLEM — O09.90 SUPERVISION OF HIGH RISK PREGNANCY, ANTEPARTUM: Status: RESOLVED | Noted: 2024-10-21 | Resolved: 2024-12-16

## 2024-12-16 PROBLEM — O09.293 PRIOR POOR OBSTETRICAL HISTORY IN THIRD TRIMESTER, ANTEPARTUM: Status: RESOLVED | Noted: 2024-06-26 | Resolved: 2024-12-16

## 2024-12-16 PROBLEM — Z98.891 HISTORY OF CESAREAN SECTION: Status: RESOLVED | Noted: 2024-10-23 | Resolved: 2024-12-16

## 2024-12-16 PROBLEM — Z98.891 S/P CESAREAN SECTION: Status: RESOLVED | Noted: 2024-10-24 | Resolved: 2024-12-16

## (undated) DEVICE — ELECTRODE REM PLYHSV RETURN 9

## (undated) DEVICE — PAD ABDOMINAL STERILE 5X9IN

## (undated) DEVICE — SYS KIWI OMNICUP VACUUM DEL

## (undated) DEVICE — DRESSING TELFA N ADH 3X8